# Patient Record
Sex: FEMALE | Race: WHITE | Employment: OTHER | URBAN - METROPOLITAN AREA
[De-identification: names, ages, dates, MRNs, and addresses within clinical notes are randomized per-mention and may not be internally consistent; named-entity substitution may affect disease eponyms.]

---

## 2017-01-31 PROBLEM — R10.9 ABDOMINAL PAIN, ACUTE: Status: ACTIVE | Noted: 2017-01-31

## 2017-03-14 PROBLEM — K57.20 DIVERTICULITIS OF LARGE INTESTINE WITH PERFORATION AND ABSCESS: Status: ACTIVE | Noted: 2017-03-14

## 2017-04-12 PROBLEM — Z93.3 S/P COLOSTOMY (HCC): Status: ACTIVE | Noted: 2017-04-12

## 2017-07-19 PROBLEM — M25.562 PAIN IN BOTH KNEES: Status: ACTIVE | Noted: 2017-07-19

## 2017-07-19 PROBLEM — M54.2 NECK PAIN: Status: ACTIVE | Noted: 2017-07-19

## 2017-07-19 PROBLEM — R76.8 RHEUMATOID FACTOR POSITIVE: Status: ACTIVE | Noted: 2017-07-19

## 2017-07-19 PROBLEM — M25.50 ARTHRALGIA: Status: ACTIVE | Noted: 2017-07-19

## 2017-07-19 PROBLEM — M25.561 PAIN IN BOTH KNEES: Status: ACTIVE | Noted: 2017-07-19

## 2017-09-12 PROBLEM — K21.9 GERD (GASTROESOPHAGEAL REFLUX DISEASE): Status: ACTIVE | Noted: 2017-09-12

## 2017-09-12 PROBLEM — M05.79 RHEUMATOID ARTHRITIS INVOLVING MULTIPLE SITES WITH POSITIVE RHEUMATOID FACTOR (HCC): Chronic | Status: ACTIVE | Noted: 2017-09-12

## 2017-09-12 PROBLEM — I10 ESSENTIAL HYPERTENSION: Chronic | Status: ACTIVE | Noted: 2017-09-12

## 2017-09-12 PROBLEM — E03.9 HYPOTHYROIDISM: Chronic | Status: ACTIVE | Noted: 2017-09-12

## 2017-10-24 PROBLEM — F32.A ANXIETY AND DEPRESSION: Status: ACTIVE | Noted: 2017-10-24

## 2017-10-24 PROBLEM — M85.80 OSTEOPENIA: Status: ACTIVE | Noted: 2017-10-24

## 2017-10-24 PROBLEM — E55.9 VITAMIN D DEFICIENCY: Status: ACTIVE | Noted: 2017-10-24

## 2017-10-24 PROBLEM — F41.9 ANXIETY AND DEPRESSION: Status: ACTIVE | Noted: 2017-10-24

## 2018-08-03 DIAGNOSIS — F32.A ANXIETY AND DEPRESSION: ICD-10-CM

## 2018-08-03 DIAGNOSIS — E03.9 ACQUIRED HYPOTHYROIDISM: ICD-10-CM

## 2018-08-03 DIAGNOSIS — F41.9 ANXIETY AND DEPRESSION: ICD-10-CM

## 2018-08-03 RX ORDER — LEVOTHYROXINE SODIUM 0.12 MG/1
125 TABLET ORAL DAILY
Qty: 30 TABLET | Refills: 0 | Status: SHIPPED | OUTPATIENT
Start: 2018-08-03 | End: 2018-08-10 | Stop reason: SDUPTHER

## 2018-08-03 RX ORDER — SERTRALINE HYDROCHLORIDE 100 MG/1
100 TABLET, FILM COATED ORAL DAILY
Qty: 30 TABLET | Refills: 0 | Status: SHIPPED | OUTPATIENT
Start: 2018-08-03 | End: 2018-08-10 | Stop reason: SDUPTHER

## 2018-08-10 ENCOUNTER — HOSPITAL ENCOUNTER (OUTPATIENT)
Age: 63
Discharge: HOME | End: 2018-08-12
Payer: MEDICARE

## 2018-08-10 ENCOUNTER — OFFICE VISIT (OUTPATIENT)
Dept: FAMILY MEDICINE CLINIC | Age: 63
End: 2018-08-10
Payer: MEDICARE

## 2018-08-10 VITALS
HEART RATE: 64 BPM | HEIGHT: 71 IN | BODY MASS INDEX: 41.02 KG/M2 | WEIGHT: 293 LBS | SYSTOLIC BLOOD PRESSURE: 120 MMHG | DIASTOLIC BLOOD PRESSURE: 80 MMHG | OXYGEN SATURATION: 95 %

## 2018-08-10 DIAGNOSIS — E55.9 VITAMIN D DEFICIENCY: ICD-10-CM

## 2018-08-10 DIAGNOSIS — K21.9 GASTROESOPHAGEAL REFLUX DISEASE, ESOPHAGITIS PRESENCE NOT SPECIFIED: Chronic | ICD-10-CM

## 2018-08-10 DIAGNOSIS — Z12.39 BREAST CANCER SCREENING: ICD-10-CM

## 2018-08-10 DIAGNOSIS — Z23 NEED FOR STREPTOCOCCUS PNEUMONIAE VACCINATION: ICD-10-CM

## 2018-08-10 DIAGNOSIS — E03.9 ACQUIRED HYPOTHYROIDISM: Primary | ICD-10-CM

## 2018-08-10 DIAGNOSIS — Z13.220 NEED FOR LIPID SCREENING: ICD-10-CM

## 2018-08-10 DIAGNOSIS — M05.79 RHEUMATOID ARTHRITIS INVOLVING MULTIPLE SITES WITH POSITIVE RHEUMATOID FACTOR (HCC): Chronic | ICD-10-CM

## 2018-08-10 DIAGNOSIS — F41.9 ANXIETY AND DEPRESSION: ICD-10-CM

## 2018-08-10 DIAGNOSIS — E03.9 ACQUIRED HYPOTHYROIDISM: ICD-10-CM

## 2018-08-10 DIAGNOSIS — Z93.3 S/P COLOSTOMY (HCC): ICD-10-CM

## 2018-08-10 DIAGNOSIS — F32.A ANXIETY AND DEPRESSION: ICD-10-CM

## 2018-08-10 PROBLEM — R10.9 ABDOMINAL PAIN, ACUTE: Status: RESOLVED | Noted: 2017-01-31 | Resolved: 2018-08-10

## 2018-08-10 PROBLEM — M85.80 OSTEOPENIA: Chronic | Status: ACTIVE | Noted: 2017-10-24

## 2018-08-10 PROBLEM — I10 ESSENTIAL HYPERTENSION: Chronic | Status: RESOLVED | Noted: 2017-09-12 | Resolved: 2018-08-10

## 2018-08-10 PROCEDURE — 82746 ASSAY OF FOLIC ACID SERUM: CPT

## 2018-08-10 PROCEDURE — 84439 ASSAY OF FREE THYROXINE: CPT

## 2018-08-10 PROCEDURE — 84443 ASSAY THYROID STIM HORMONE: CPT

## 2018-08-10 PROCEDURE — 85025 COMPLETE CBC W/AUTO DIFF WBC: CPT

## 2018-08-10 PROCEDURE — 80061 LIPID PANEL: CPT

## 2018-08-10 PROCEDURE — 82607 VITAMIN B-12: CPT

## 2018-08-10 PROCEDURE — 99214 OFFICE O/P EST MOD 30 MIN: CPT | Performed by: FAMILY MEDICINE

## 2018-08-10 PROCEDURE — 80053 COMPREHEN METABOLIC PANEL: CPT

## 2018-08-10 PROCEDURE — 36415 COLL VENOUS BLD VENIPUNCTURE: CPT

## 2018-08-10 PROCEDURE — 82306 VITAMIN D 25 HYDROXY: CPT

## 2018-08-10 RX ORDER — OMEPRAZOLE 20 MG/1
20 CAPSULE, DELAYED RELEASE ORAL DAILY
Qty: 30 CAPSULE | Refills: 5 | Status: SHIPPED | OUTPATIENT
Start: 2018-08-10 | End: 2019-04-22 | Stop reason: SDUPTHER

## 2018-08-10 RX ORDER — LEVOTHYROXINE SODIUM 0.12 MG/1
125 TABLET ORAL DAILY
Qty: 30 TABLET | Refills: 5 | Status: SHIPPED | OUTPATIENT
Start: 2018-08-10 | End: 2019-02-17 | Stop reason: SDUPTHER

## 2018-08-10 RX ORDER — IBUPROFEN 800 MG/1
TABLET ORAL
COMMUNITY
Start: 2018-08-07 | End: 2020-03-27 | Stop reason: ALTCHOICE

## 2018-08-10 RX ORDER — SERTRALINE HYDROCHLORIDE 100 MG/1
100 TABLET, FILM COATED ORAL DAILY
Qty: 30 TABLET | Refills: 5 | Status: SHIPPED | OUTPATIENT
Start: 2018-08-10 | End: 2019-02-17 | Stop reason: SDUPTHER

## 2018-08-10 RX ORDER — FOLIC ACID 1 MG/1
1 TABLET ORAL DAILY
COMMUNITY
End: 2019-09-18

## 2018-08-10 RX ORDER — METHOTREXATE 2.5 MG/1
TABLET ORAL
COMMUNITY
Start: 2018-07-29

## 2018-08-10 ASSESSMENT — PATIENT HEALTH QUESTIONNAIRE - PHQ9
2. FEELING DOWN, DEPRESSED OR HOPELESS: 0
SUM OF ALL RESPONSES TO PHQ9 QUESTIONS 1 & 2: 0
SUM OF ALL RESPONSES TO PHQ QUESTIONS 1-9: 0
SUM OF ALL RESPONSES TO PHQ QUESTIONS 1-9: 0
1. LITTLE INTEREST OR PLEASURE IN DOING THINGS: 0

## 2018-08-10 ASSESSMENT — ENCOUNTER SYMPTOMS
COUGH: 0
SHORTNESS OF BREATH: 0
WHEEZING: 0
ABDOMINAL PAIN: 0
BLOOD IN STOOL: 0

## 2018-08-10 NOTE — PROGRESS NOTES
Rose Granados   Patient is a 70y.o. year old female who presents with:  Chief Complaint   Patient presents with    Thyroid Problem     6 mo F/U     Health Maintenance     Needs mammo (pt did not get mammo ordered in January)      Patient also follows with: Rheumatologysees every 4-5 months. HPI    Rheumatoid arthritis: Current treatment methotrexate 20 mg weekly, folic acid 1 mg daily. Recent changes in medication: None. Patient reports symptoms have been well controlled with current treatment. She is following with rheumatology. Hypothyroidism: Current treatment: levothyroxine 125 ug daily. Recent changes in medication: none. Current symptoms: Weight gain. Patient denies diarrhea and palpitations. Symptoms have been well-controlled. Most recent relevant lab results as below. Lab Results   Component Value Date    TSH 2.090 03/06/2018    TSH 4.380 (H) 07/19/2017    TSH 3.640 01/24/2017     Anxiety and depression: Current treatment sertraline 100 mg daily. Recent changes in medication none. Patient reports symptoms have remained well controlled with the current treatment and denies any problems with the medication. GERD: Current treatment omeprazole 40 mg dailystates she actually takes it only about once every 3 days. Reports symptoms have been well controlled. Denies recent heartburn, dysphagia, blood in the stool. Vitamin D deficiency: Current treatment cholecalciferol 2000 units daily. Recent changes in medication: None. Relevant history includes osteopenia on most recent DEXA scan. Most recent lab results below. Lab Results   Component Value Date    VITD25 28 (L) 03/06/2018     Review of Systems   Constitutional: Positive for unexpected weight change. Negative for fatigue. Eyes: Negative for visual disturbance. Respiratory: Negative for cough, shortness of breath and wheezing. Cardiovascular: Negative for chest pain, palpitations and leg swelling.    Gastrointestinal: Negative for today; she is to get 1 set done prior to leaving today and the 2nd set 1 week prior to next visit. Will contact patient by phone if needed pending review of results. 1. Acquired hypothyroidism  Controlled, continue current treatment. - levothyroxine (SYNTHROID) 125 MCG tablet; Take 1 tablet by mouth Daily  Dispense: 30 tablet; Refill: 5  - T4, Free; Future  - TSH without Reflex; Future  - T4, Free; Future  - TSH without Reflex; Future    2. Anxiety and depression  Controlled, continue current treatment. - sertraline (ZOLOFT) 100 MG tablet; Take 1 tablet by mouth daily  Dispense: 30 tablet; Refill: 5    3. Rheumatoid arthritis involving multiple sites with positive rheumatoid factor (HCC)  Controlled, continue as per rheumatology    4. Gastroesophageal reflux disease, esophagitis presence not specified  Discontinue omeprazole 40 mg which she takes once every 3 days on average and begin omeprazole 20 mg daily  - omeprazole (PRILOSEC) 20 MG delayed release capsule; Take 1 capsule by mouth daily  Dispense: 30 capsule; Refill: 5  - CBC Auto Differential; Future  - CBC Auto Differential; Future  - Comprehensive Metabolic Panel; Future    5. Vitamin D deficiency  Continue current treatment and obtain relevant lab work for review next visit. - Comprehensive Metabolic Panel; Future  - Vitamin D 25 Hydrox, D2 & D3; Future  - Vitamin D 25 Hydrox, D2 & D3; Future    6. [facility]  Status post take down. Obtain relevant lab work reviewed  - CBC Auto Differential; Future  - Comprehensive Metabolic Panel; Future  - Vitamin D 25 Hydrox, D2 & D3; Future  - Vitamin B12 & Folate; Future  - Vitamin D 25 Hydrox, D2 & D3; Future  - Vitamin B12 & Folate; Future    7. Need for lipid screening  - Lipid Panel; Future    8. Breast cancer screening  - ALANNA DIGITAL SCREEN W CAD BILATERAL; Future    9. Need for Streptococcus pneumoniae vaccination  Prevnar ordered today but out of stock therefore not administered.     Return in about 6 months (around 2/10/2019) for lab review, or sooner as needed. Call or go to ED immediately if symptoms worsen or persist.    Educational materials and/or home exercises printed for patient's review and were included in patient instructions on his/her After Visit Summary and given to patient at the end of visit.       Counseled regarding above diagnosis, including possible risks and complications, especially if left uncontrolled.     Counseled regarding the possible side effects, risks, benefits and alternatives to treatment; patient and/or guardian verbalizes understanding, agrees, feels comfortable with and wishes to proceed with above treatment plan.     Advised patient to call with any new medication issues, and read all Rx info from pharmacy to assure aware of all possible risks and side effects of medication before taking.     Reviewed age and gender appropriate health screening exams and vaccinations. Advised patient regarding importance of keeping up with recommended health maintenance and to schedule as soon as possible if overdue, as this is important in assessing for undiagnosed pathology, especially cancer, as well as protecting against potentially harmful/life threatening disease.       Amy Anna DO  08/10/18  8:17 PM

## 2018-08-11 LAB
ALBUMIN SERPL-MCNC: 4.7 G/DL (ref 3.5–5.2)
ALP BLD-CCNC: 108 U/L (ref 35–104)
ALT SERPL-CCNC: 33 U/L (ref 0–32)
ANION GAP SERPL CALCULATED.3IONS-SCNC: 18 MMOL/L (ref 7–16)
AST SERPL-CCNC: 39 U/L (ref 0–31)
BASOPHILS ABSOLUTE: 0.05 E9/L (ref 0–0.2)
BASOPHILS RELATIVE PERCENT: 1.2 % (ref 0–2)
BILIRUB SERPL-MCNC: 0.5 MG/DL (ref 0–1.2)
BUN BLDV-MCNC: 14 MG/DL (ref 8–23)
CALCIUM SERPL-MCNC: 9.4 MG/DL (ref 8.6–10.2)
CHLORIDE BLD-SCNC: 102 MMOL/L (ref 98–107)
CHOLESTEROL, TOTAL: 216 MG/DL (ref 0–199)
CO2: 22 MMOL/L (ref 22–29)
CREAT SERPL-MCNC: 0.6 MG/DL (ref 0.5–1)
EOSINOPHILS ABSOLUTE: 0.08 E9/L (ref 0.05–0.5)
EOSINOPHILS RELATIVE PERCENT: 1.9 % (ref 0–6)
FOLATE: >20 NG/ML (ref 4.8–24.2)
GFR AFRICAN AMERICAN: >60
GFR NON-AFRICAN AMERICAN: >60 ML/MIN/1.73
GLUCOSE BLD-MCNC: 86 MG/DL (ref 74–109)
HCT VFR BLD CALC: 42.2 % (ref 34–48)
HDLC SERPL-MCNC: 62 MG/DL
HEMOGLOBIN: 13.8 G/DL (ref 11.5–15.5)
IMMATURE GRANULOCYTES #: 0.02 E9/L
IMMATURE GRANULOCYTES %: 0.5 % (ref 0–5)
LDL CHOLESTEROL CALCULATED: 126 MG/DL (ref 0–99)
LYMPHOCYTES ABSOLUTE: 0.64 E9/L (ref 1.5–4)
LYMPHOCYTES RELATIVE PERCENT: 15.2 % (ref 20–42)
MCH RBC QN AUTO: 28.3 PG (ref 26–35)
MCHC RBC AUTO-ENTMCNC: 32.7 % (ref 32–34.5)
MCV RBC AUTO: 86.7 FL (ref 80–99.9)
MONOCYTES ABSOLUTE: 0.36 E9/L (ref 0.1–0.95)
MONOCYTES RELATIVE PERCENT: 8.6 % (ref 2–12)
NEUTROPHILS ABSOLUTE: 3.05 E9/L (ref 1.8–7.3)
NEUTROPHILS RELATIVE PERCENT: 72.6 % (ref 43–80)
PDW BLD-RTO: 14.1 FL (ref 11.5–15)
PLATELET # BLD: 203 E9/L (ref 130–450)
PMV BLD AUTO: 11 FL (ref 7–12)
POTASSIUM SERPL-SCNC: 4.3 MMOL/L (ref 3.5–5)
RBC # BLD: 4.87 E12/L (ref 3.5–5.5)
SODIUM BLD-SCNC: 142 MMOL/L (ref 132–146)
T4 FREE: 1.81 NG/DL (ref 0.93–1.7)
TOTAL PROTEIN: 7.7 G/DL (ref 6.4–8.3)
TRIGL SERPL-MCNC: 139 MG/DL (ref 0–149)
TSH SERPL DL<=0.05 MIU/L-ACNC: 1.79 UIU/ML (ref 0.27–4.2)
VITAMIN B-12: 591 PG/ML (ref 211–946)
VITAMIN D 25-HYDROXY: 32 NG/ML (ref 30–100)
VLDLC SERPL CALC-MCNC: 28 MG/DL
WBC # BLD: 4.2 E9/L (ref 4.5–11.5)

## 2018-08-13 ENCOUNTER — TELEPHONE (OUTPATIENT)
Dept: FAMILY MEDICINE CLINIC | Age: 63
End: 2018-08-13

## 2018-12-18 ENCOUNTER — OFFICE VISIT (OUTPATIENT)
Dept: FAMILY MEDICINE CLINIC | Age: 63
End: 2018-12-18
Payer: MEDICARE

## 2018-12-18 VITALS
WEIGHT: 293 LBS | DIASTOLIC BLOOD PRESSURE: 86 MMHG | HEART RATE: 84 BPM | OXYGEN SATURATION: 99 % | SYSTOLIC BLOOD PRESSURE: 128 MMHG | BODY MASS INDEX: 39.68 KG/M2 | HEIGHT: 72 IN

## 2018-12-18 DIAGNOSIS — G51.0 BELL PALSY: Primary | ICD-10-CM

## 2018-12-18 PROCEDURE — 99213 OFFICE O/P EST LOW 20 MIN: CPT | Performed by: FAMILY MEDICINE

## 2018-12-18 RX ORDER — PREDNISONE 20 MG/1
20 TABLET ORAL 2 TIMES DAILY
Qty: 20 TABLET | Refills: 0 | Status: SHIPPED | OUTPATIENT
Start: 2018-12-18 | End: 2018-12-28

## 2018-12-18 RX ORDER — VALACYCLOVIR HYDROCHLORIDE 1 G/1
1000 TABLET, FILM COATED ORAL 3 TIMES DAILY
Qty: 21 TABLET | Refills: 0 | Status: SHIPPED | OUTPATIENT
Start: 2018-12-18 | End: 2018-12-25

## 2018-12-18 ASSESSMENT — ENCOUNTER SYMPTOMS
EYE PAIN: 0
TROUBLE SWALLOWING: 0
SHORTNESS OF BREATH: 0

## 2019-01-07 ENCOUNTER — OFFICE VISIT (OUTPATIENT)
Dept: FAMILY MEDICINE CLINIC | Age: 64
End: 2019-01-07
Payer: MEDICARE

## 2019-01-07 ENCOUNTER — HOSPITAL ENCOUNTER (OUTPATIENT)
Age: 64
Discharge: HOME | End: 2019-01-09
Payer: MEDICARE

## 2019-01-07 ENCOUNTER — PATIENT MESSAGE (OUTPATIENT)
Dept: FAMILY MEDICINE CLINIC | Age: 64
End: 2019-01-07

## 2019-01-07 VITALS
HEIGHT: 71 IN | BODY MASS INDEX: 41.02 KG/M2 | OXYGEN SATURATION: 94 % | WEIGHT: 293 LBS | SYSTOLIC BLOOD PRESSURE: 130 MMHG | DIASTOLIC BLOOD PRESSURE: 82 MMHG | HEART RATE: 103 BPM

## 2019-01-07 DIAGNOSIS — J18.9 COMMUNITY ACQUIRED PNEUMONIA, UNSPECIFIED LATERALITY: Primary | ICD-10-CM

## 2019-01-07 DIAGNOSIS — J18.9 COMMUNITY ACQUIRED PNEUMONIA, UNSPECIFIED LATERALITY: ICD-10-CM

## 2019-01-07 PROCEDURE — 87798 DETECT AGENT NOS DNA AMP: CPT

## 2019-01-07 PROCEDURE — 87581 M.PNEUMON DNA AMP PROBE: CPT

## 2019-01-07 PROCEDURE — 87486 CHLMYD PNEUM DNA AMP PROBE: CPT

## 2019-01-07 PROCEDURE — 85025 COMPLETE CBC W/AUTO DIFF WBC: CPT

## 2019-01-07 PROCEDURE — 36415 COLL VENOUS BLD VENIPUNCTURE: CPT

## 2019-01-07 PROCEDURE — 80053 COMPREHEN METABOLIC PANEL: CPT

## 2019-01-07 PROCEDURE — 99213 OFFICE O/P EST LOW 20 MIN: CPT | Performed by: FAMILY MEDICINE

## 2019-01-07 PROCEDURE — 87633 RESP VIRUS 12-25 TARGETS: CPT

## 2019-01-07 RX ORDER — BENZONATATE 100 MG/1
100 CAPSULE ORAL 3 TIMES DAILY PRN
Qty: 21 CAPSULE | Refills: 0 | Status: SHIPPED | OUTPATIENT
Start: 2019-01-07 | End: 2019-01-14

## 2019-01-07 RX ORDER — ALBUTEROL SULFATE 90 UG/1
2 AEROSOL, METERED RESPIRATORY (INHALATION) 4 TIMES DAILY PRN
Qty: 3 INHALER | Refills: 0 | Status: ON HOLD | OUTPATIENT
Start: 2019-01-07 | End: 2019-05-26

## 2019-01-07 RX ORDER — AZITHROMYCIN 250 MG/1
250 TABLET, FILM COATED ORAL SEE ADMIN INSTRUCTIONS
Qty: 6 TABLET | Refills: 0 | Status: SHIPPED | OUTPATIENT
Start: 2019-01-07 | End: 2019-01-12

## 2019-01-07 ASSESSMENT — ENCOUNTER SYMPTOMS
SHORTNESS OF BREATH: 1
COUGH: 1
ABDOMINAL PAIN: 0
NAUSEA: 1
BLOOD IN STOOL: 0
WHEEZING: 1
SINUS PAIN: 0
SORE THROAT: 0

## 2019-01-08 LAB
ALBUMIN SERPL-MCNC: 3.2 G/DL (ref 3.5–5.2)
ALP BLD-CCNC: 133 U/L (ref 35–104)
ALT SERPL-CCNC: 29 U/L (ref 0–32)
ANION GAP SERPL CALCULATED.3IONS-SCNC: 13 MMOL/L (ref 7–16)
ANISOCYTOSIS: ABNORMAL
AST SERPL-CCNC: 21 U/L (ref 0–31)
BASOPHILS ABSOLUTE: 0.04 E9/L (ref 0–0.2)
BASOPHILS RELATIVE PERCENT: 0.5 % (ref 0–2)
BILIRUB SERPL-MCNC: 0.7 MG/DL (ref 0–1.2)
BUN BLDV-MCNC: 17 MG/DL (ref 8–23)
BURR CELLS: ABNORMAL
CALCIUM SERPL-MCNC: 9.6 MG/DL (ref 8.6–10.2)
CHLORIDE BLD-SCNC: 99 MMOL/L (ref 98–107)
CO2: 26 MMOL/L (ref 22–29)
CREAT SERPL-MCNC: 0.6 MG/DL (ref 0.5–1)
EOSINOPHILS ABSOLUTE: 0.11 E9/L (ref 0.05–0.5)
EOSINOPHILS RELATIVE PERCENT: 1.3 % (ref 0–6)
FILM ARRAY ADENOVIRUS: NORMAL
FILM ARRAY BORDETELLA PERTUSSIS: NORMAL
FILM ARRAY CHLAMYDOPHILIA PNEUMONIAE: NORMAL
FILM ARRAY CORONAVIRUS 229E: NORMAL
FILM ARRAY CORONAVIRUS HKU1: NORMAL
FILM ARRAY CORONAVIRUS NL63: NORMAL
FILM ARRAY CORONAVIRUS OC43: NORMAL
FILM ARRAY INFLUENZA A VIRUS 09H1: NORMAL
FILM ARRAY INFLUENZA A VIRUS H1: NORMAL
FILM ARRAY INFLUENZA A VIRUS H3: NORMAL
FILM ARRAY INFLUENZA A VIRUS: NORMAL
FILM ARRAY INFLUENZA B: NORMAL
FILM ARRAY METAPNEUMOVIRUS: NORMAL
FILM ARRAY MYCOPLASMA PNEUMONIAE: NORMAL
FILM ARRAY PARAINFLUENZA VIRUS 1: NORMAL
FILM ARRAY PARAINFLUENZA VIRUS 2: NORMAL
FILM ARRAY PARAINFLUENZA VIRUS 3: NORMAL
FILM ARRAY PARAINFLUENZA VIRUS 4: NORMAL
FILM ARRAY RESPIRATORY SYNCITIAL VIRUS: NORMAL
FILM ARRAY RHINOVIRUS/ENTEROVIRUS: NORMAL
GFR AFRICAN AMERICAN: >60
GFR NON-AFRICAN AMERICAN: >60 ML/MIN/1.73
GLUCOSE BLD-MCNC: 114 MG/DL (ref 74–99)
HCT VFR BLD CALC: 44.7 % (ref 34–48)
HEMOGLOBIN: 14.4 G/DL (ref 11.5–15.5)
IMMATURE GRANULOCYTES #: 0.06 E9/L
IMMATURE GRANULOCYTES %: 0.7 % (ref 0–5)
LYMPHOCYTES ABSOLUTE: 0.37 E9/L (ref 1.5–4)
LYMPHOCYTES RELATIVE PERCENT: 4.2 % (ref 20–42)
MCH RBC QN AUTO: 28.6 PG (ref 26–35)
MCHC RBC AUTO-ENTMCNC: 32.2 % (ref 32–34.5)
MCV RBC AUTO: 88.9 FL (ref 80–99.9)
MONOCYTES ABSOLUTE: 0.52 E9/L (ref 0.1–0.95)
MONOCYTES RELATIVE PERCENT: 6 % (ref 2–12)
NEUTROPHILS ABSOLUTE: 7.62 E9/L (ref 1.8–7.3)
NEUTROPHILS RELATIVE PERCENT: 87.3 % (ref 43–80)
OVALOCYTES: ABNORMAL
PDW BLD-RTO: 14.4 FL (ref 11.5–15)
PLATELET # BLD: 241 E9/L (ref 130–450)
PMV BLD AUTO: 10.4 FL (ref 7–12)
POIKILOCYTES: ABNORMAL
POLYCHROMASIA: ABNORMAL
POTASSIUM SERPL-SCNC: 4.1 MMOL/L (ref 3.5–5)
RBC # BLD: 5.03 E12/L (ref 3.5–5.5)
SODIUM BLD-SCNC: 138 MMOL/L (ref 132–146)
TEAR DROP CELLS: ABNORMAL
TOTAL PROTEIN: 8.1 G/DL (ref 6.4–8.3)
WBC # BLD: 8.7 E9/L (ref 4.5–11.5)

## 2019-01-17 ENCOUNTER — TELEPHONE (OUTPATIENT)
Dept: FAMILY MEDICINE CLINIC | Age: 64
End: 2019-01-17

## 2019-01-17 PROBLEM — M25.561 PAIN IN BOTH KNEES: Status: RESOLVED | Noted: 2017-07-19 | Resolved: 2019-01-17

## 2019-01-17 PROBLEM — M25.562 PAIN IN BOTH KNEES: Status: RESOLVED | Noted: 2017-07-19 | Resolved: 2019-01-17

## 2019-01-17 PROBLEM — K57.20 DIVERTICULITIS OF LARGE INTESTINE WITH ABSCESS WITHOUT BLEEDING: Status: RESOLVED | Noted: 2017-03-14 | Resolved: 2019-01-17

## 2019-01-23 ENCOUNTER — OFFICE VISIT (OUTPATIENT)
Dept: FAMILY MEDICINE CLINIC | Age: 64
End: 2019-01-23
Payer: MEDICARE

## 2019-01-23 VITALS
OXYGEN SATURATION: 95 % | SYSTOLIC BLOOD PRESSURE: 126 MMHG | WEIGHT: 293 LBS | BODY MASS INDEX: 39.68 KG/M2 | HEART RATE: 85 BPM | HEIGHT: 72 IN | DIASTOLIC BLOOD PRESSURE: 80 MMHG

## 2019-01-23 DIAGNOSIS — J18.9 COMMUNITY ACQUIRED PNEUMONIA, UNSPECIFIED LATERALITY: Primary | ICD-10-CM

## 2019-01-23 DIAGNOSIS — Z12.39 BREAST CANCER SCREENING: ICD-10-CM

## 2019-01-23 PROCEDURE — 99213 OFFICE O/P EST LOW 20 MIN: CPT | Performed by: FAMILY MEDICINE

## 2019-01-23 RX ORDER — LEVOFLOXACIN 500 MG/1
500 TABLET, FILM COATED ORAL DAILY
Qty: 10 TABLET | Refills: 0 | Status: SHIPPED | OUTPATIENT
Start: 2019-01-23 | End: 2019-02-02

## 2019-01-23 ASSESSMENT — ENCOUNTER SYMPTOMS
SORE THROAT: 0
ABDOMINAL PAIN: 0
SHORTNESS OF BREATH: 0
BLOOD IN STOOL: 0
NAUSEA: 0
SINUS PAIN: 0
COUGH: 1
WHEEZING: 1

## 2019-02-17 DIAGNOSIS — F32.A ANXIETY AND DEPRESSION: ICD-10-CM

## 2019-02-17 DIAGNOSIS — F41.9 ANXIETY AND DEPRESSION: ICD-10-CM

## 2019-02-17 DIAGNOSIS — E03.9 ACQUIRED HYPOTHYROIDISM: ICD-10-CM

## 2019-02-18 RX ORDER — LEVOTHYROXINE SODIUM 0.12 MG/1
TABLET ORAL
Qty: 30 TABLET | Refills: 5 | Status: SHIPPED | OUTPATIENT
Start: 2019-02-18 | End: 2019-08-18 | Stop reason: SDUPTHER

## 2019-02-18 RX ORDER — SERTRALINE HYDROCHLORIDE 100 MG/1
TABLET, FILM COATED ORAL
Qty: 30 TABLET | Refills: 5 | Status: SHIPPED | OUTPATIENT
Start: 2019-02-18 | End: 2019-08-18 | Stop reason: SDUPTHER

## 2019-03-01 ENCOUNTER — OFFICE VISIT (OUTPATIENT)
Dept: FAMILY MEDICINE CLINIC | Age: 64
End: 2019-03-01
Payer: MEDICARE

## 2019-03-01 VITALS
BODY MASS INDEX: 39.68 KG/M2 | HEART RATE: 61 BPM | WEIGHT: 293 LBS | OXYGEN SATURATION: 97 % | SYSTOLIC BLOOD PRESSURE: 128 MMHG | DIASTOLIC BLOOD PRESSURE: 82 MMHG | HEIGHT: 72 IN

## 2019-03-01 DIAGNOSIS — Z23 NEED FOR STREPTOCOCCUS PNEUMONIAE VACCINATION: ICD-10-CM

## 2019-03-01 DIAGNOSIS — Z01.818 PREOPERATIVE EXAMINATION: Primary | ICD-10-CM

## 2019-03-01 PROCEDURE — G0009 ADMIN PNEUMOCOCCAL VACCINE: HCPCS | Performed by: FAMILY MEDICINE

## 2019-03-01 PROCEDURE — 99213 OFFICE O/P EST LOW 20 MIN: CPT | Performed by: FAMILY MEDICINE

## 2019-03-01 PROCEDURE — 90670 PCV13 VACCINE IM: CPT | Performed by: FAMILY MEDICINE

## 2019-03-01 ASSESSMENT — PATIENT HEALTH QUESTIONNAIRE - PHQ9
SUM OF ALL RESPONSES TO PHQ QUESTIONS 1-9: 0
2. FEELING DOWN, DEPRESSED OR HOPELESS: 0
SUM OF ALL RESPONSES TO PHQ QUESTIONS 1-9: 0
1. LITTLE INTEREST OR PLEASURE IN DOING THINGS: 0
SUM OF ALL RESPONSES TO PHQ9 QUESTIONS 1 & 2: 0

## 2019-03-01 ASSESSMENT — ENCOUNTER SYMPTOMS
ABDOMINAL PAIN: 0
WHEEZING: 0
SHORTNESS OF BREATH: 0
DIARRHEA: 0
COUGH: 0
CHEST TIGHTNESS: 0
SINUS PAIN: 0
SORE THROAT: 0

## 2019-04-22 DIAGNOSIS — K21.9 GASTROESOPHAGEAL REFLUX DISEASE, ESOPHAGITIS PRESENCE NOT SPECIFIED: Chronic | ICD-10-CM

## 2019-04-23 RX ORDER — OMEPRAZOLE 20 MG/1
CAPSULE, DELAYED RELEASE ORAL
Qty: 30 CAPSULE | Refills: 5 | Status: SHIPPED | OUTPATIENT
Start: 2019-04-23 | End: 2019-10-10 | Stop reason: SDUPTHER

## 2019-05-25 ENCOUNTER — APPOINTMENT (OUTPATIENT)
Dept: CT IMAGING | Age: 64
DRG: 872 | End: 2019-05-25
Payer: MEDICARE

## 2019-05-25 ENCOUNTER — HOSPITAL ENCOUNTER (EMERGENCY)
Age: 64
Discharge: HOME | DRG: 872 | End: 2019-05-26
Attending: EMERGENCY MEDICINE
Payer: MEDICARE

## 2019-05-25 ENCOUNTER — APPOINTMENT (OUTPATIENT)
Dept: GENERAL RADIOLOGY | Age: 64
DRG: 872 | End: 2019-05-25
Payer: MEDICARE

## 2019-05-25 ENCOUNTER — HOSPITAL ENCOUNTER (EMERGENCY)
Age: 64
Discharge: HOME | DRG: 872 | End: 2019-05-25
Attending: EMERGENCY MEDICINE
Payer: MEDICARE

## 2019-05-25 VITALS
HEART RATE: 85 BPM | RESPIRATION RATE: 18 BRPM | BODY MASS INDEX: 39.68 KG/M2 | WEIGHT: 293 LBS | OXYGEN SATURATION: 98 % | DIASTOLIC BLOOD PRESSURE: 74 MMHG | TEMPERATURE: 98.4 F | SYSTOLIC BLOOD PRESSURE: 127 MMHG | HEIGHT: 72 IN

## 2019-05-25 DIAGNOSIS — R51.9 NONINTRACTABLE HEADACHE, UNSPECIFIED CHRONICITY PATTERN, UNSPECIFIED HEADACHE TYPE: ICD-10-CM

## 2019-05-25 DIAGNOSIS — N12 PYELONEPHRITIS: Primary | ICD-10-CM

## 2019-05-25 DIAGNOSIS — R11.0 NAUSEA: ICD-10-CM

## 2019-05-25 DIAGNOSIS — R53.83 OTHER FATIGUE: ICD-10-CM

## 2019-05-25 DIAGNOSIS — E87.6 HYPOKALEMIA: ICD-10-CM

## 2019-05-25 DIAGNOSIS — R11.2 NAUSEA AND VOMITING, INTRACTABILITY OF VOMITING NOT SPECIFIED, UNSPECIFIED VOMITING TYPE: Primary | ICD-10-CM

## 2019-05-25 LAB
ALBUMIN SERPL-MCNC: 3.6 G/DL (ref 3.5–5.2)
ALP BLD-CCNC: 117 U/L (ref 35–104)
ALT SERPL-CCNC: 32 U/L (ref 0–32)
ANION GAP SERPL CALCULATED.3IONS-SCNC: 12 MMOL/L (ref 7–16)
ANION GAP SERPL CALCULATED.3IONS-SCNC: 14 MMOL/L (ref 7–16)
ANISOCYTOSIS: ABNORMAL
AST SERPL-CCNC: 26 U/L (ref 0–31)
BACTERIA: ABNORMAL /HPF
BASOPHILS ABSOLUTE: 0 E9/L (ref 0–0.2)
BASOPHILS ABSOLUTE: 0.06 E9/L (ref 0–0.2)
BASOPHILS RELATIVE PERCENT: 0.3 % (ref 0–2)
BASOPHILS RELATIVE PERCENT: 0.9 % (ref 0–2)
BILIRUB SERPL-MCNC: 1 MG/DL (ref 0–1.2)
BILIRUBIN URINE: NEGATIVE
BLOOD, URINE: ABNORMAL
BUN BLDV-MCNC: 13 MG/DL (ref 8–23)
BUN BLDV-MCNC: 17 MG/DL (ref 8–23)
CALCIUM SERPL-MCNC: 7.9 MG/DL (ref 8.6–10.2)
CALCIUM SERPL-MCNC: 8.6 MG/DL (ref 8.6–10.2)
CHLORIDE BLD-SCNC: 97 MMOL/L (ref 98–107)
CHLORIDE BLD-SCNC: 99 MMOL/L (ref 98–107)
CLARITY: CLEAR
CO2: 21 MMOL/L (ref 22–29)
CO2: 26 MMOL/L (ref 22–29)
COLOR: YELLOW
CREAT SERPL-MCNC: 0.7 MG/DL (ref 0.5–1)
CREAT SERPL-MCNC: 0.7 MG/DL (ref 0.5–1)
EOSINOPHILS ABSOLUTE: 0 E9/L (ref 0.05–0.5)
EOSINOPHILS ABSOLUTE: 0 E9/L (ref 0.05–0.5)
EOSINOPHILS RELATIVE PERCENT: 0 % (ref 0–6)
EOSINOPHILS RELATIVE PERCENT: 0 % (ref 0–6)
EPITHELIAL CELLS, UA: ABNORMAL /HPF
GFR AFRICAN AMERICAN: >60
GFR AFRICAN AMERICAN: >60
GFR NON-AFRICAN AMERICAN: >60 ML/MIN/1.73
GFR NON-AFRICAN AMERICAN: >60 ML/MIN/1.73
GLUCOSE BLD-MCNC: 167 MG/DL (ref 74–99)
GLUCOSE BLD-MCNC: 194 MG/DL (ref 74–99)
GLUCOSE URINE: NEGATIVE MG/DL
HCT VFR BLD CALC: 36 % (ref 34–48)
HCT VFR BLD CALC: 38.8 % (ref 34–48)
HEMOGLOBIN: 12.3 G/DL (ref 11.5–15.5)
HEMOGLOBIN: 12.9 G/DL (ref 11.5–15.5)
INFLUENZA A BY PCR: NOT DETECTED
INFLUENZA B BY PCR: NOT DETECTED
KETONES, URINE: NEGATIVE MG/DL
LACTIC ACID: 1 MMOL/L (ref 0.5–2.2)
LEUKOCYTE ESTERASE, URINE: ABNORMAL
LIPASE: 8 U/L (ref 13–60)
LYMPHOCYTES ABSOLUTE: 0.36 E9/L (ref 1.5–4)
LYMPHOCYTES ABSOLUTE: 0.4 E9/L (ref 1.5–4)
LYMPHOCYTES RELATIVE PERCENT: 5.2 % (ref 20–42)
LYMPHOCYTES RELATIVE PERCENT: 6.1 % (ref 20–42)
MAGNESIUM: 2 MG/DL (ref 1.6–2.6)
MCH RBC QN AUTO: 28.6 PG (ref 26–35)
MCH RBC QN AUTO: 29.3 PG (ref 26–35)
MCHC RBC AUTO-ENTMCNC: 33.2 % (ref 32–34.5)
MCHC RBC AUTO-ENTMCNC: 34.2 % (ref 32–34.5)
MCV RBC AUTO: 85.7 FL (ref 80–99.9)
MCV RBC AUTO: 86 FL (ref 80–99.9)
MONOCYTES ABSOLUTE: 0.36 E9/L (ref 0.1–0.95)
MONOCYTES ABSOLUTE: 0.4 E9/L (ref 0.1–0.95)
MONOCYTES RELATIVE PERCENT: 5.2 % (ref 2–12)
MONOCYTES RELATIVE PERCENT: 6.1 % (ref 2–12)
NEUTROPHILS ABSOLUTE: 5.81 E9/L (ref 1.8–7.3)
NEUTROPHILS ABSOLUTE: 6.41 E9/L (ref 1.8–7.3)
NEUTROPHILS RELATIVE PERCENT: 87.8 % (ref 43–80)
NEUTROPHILS RELATIVE PERCENT: 88.7 % (ref 43–80)
NITRITE, URINE: NEGATIVE
OVALOCYTES: ABNORMAL
PDW BLD-RTO: 14.2 FL (ref 11.5–15)
PDW BLD-RTO: 14.4 FL (ref 11.5–15)
PH UA: 6 (ref 5–9)
PLATELET # BLD: 149 E9/L (ref 130–450)
PLATELET # BLD: 151 E9/L (ref 130–450)
PMV BLD AUTO: 10.3 FL (ref 7–12)
PMV BLD AUTO: 10.6 FL (ref 7–12)
POIKILOCYTES: ABNORMAL
POLYCHROMASIA: ABNORMAL
POTASSIUM REFLEX MAGNESIUM: 3 MMOL/L (ref 3.5–5)
POTASSIUM SERPL-SCNC: 3.6 MMOL/L (ref 3.5–5)
PROTEIN UA: 100 MG/DL
RBC # BLD: 4.2 E12/L (ref 3.5–5.5)
RBC # BLD: 4.51 E12/L (ref 3.5–5.5)
RBC UA: ABNORMAL /HPF (ref 0–2)
SODIUM BLD-SCNC: 134 MMOL/L (ref 132–146)
SODIUM BLD-SCNC: 135 MMOL/L (ref 132–146)
SPECIFIC GRAVITY UA: 1.01 (ref 1–1.03)
TOTAL PROTEIN: 7.2 G/DL (ref 6.4–8.3)
TROPONIN: <0.01 NG/ML (ref 0–0.03)
TROPONIN: <0.01 NG/ML (ref 0–0.03)
TSH SERPL DL<=0.05 MIU/L-ACNC: 2.41 UIU/ML (ref 0.27–4.2)
UROBILINOGEN, URINE: 2 E.U./DL
WBC # BLD: 6.6 E9/L (ref 4.5–11.5)
WBC # BLD: 7.2 E9/L (ref 4.5–11.5)
WBC UA: ABNORMAL /HPF (ref 0–5)

## 2019-05-25 PROCEDURE — 93005 ELECTROCARDIOGRAM TRACING: CPT | Performed by: EMERGENCY MEDICINE

## 2019-05-25 PROCEDURE — 71046 X-RAY EXAM CHEST 2 VIEWS: CPT

## 2019-05-25 PROCEDURE — 74177 CT ABD & PELVIS W/CONTRAST: CPT

## 2019-05-25 PROCEDURE — 81001 URINALYSIS AUTO W/SCOPE: CPT

## 2019-05-25 PROCEDURE — 87077 CULTURE AEROBIC IDENTIFY: CPT

## 2019-05-25 PROCEDURE — 96375 TX/PRO/DX INJ NEW DRUG ADDON: CPT

## 2019-05-25 PROCEDURE — 83605 ASSAY OF LACTIC ACID: CPT

## 2019-05-25 PROCEDURE — 80048 BASIC METABOLIC PNL TOTAL CA: CPT

## 2019-05-25 PROCEDURE — 85025 COMPLETE CBC W/AUTO DIFF WBC: CPT

## 2019-05-25 PROCEDURE — 6360000002 HC RX W HCPCS: Performed by: EMERGENCY MEDICINE

## 2019-05-25 PROCEDURE — 6360000002 HC RX W HCPCS: Performed by: STUDENT IN AN ORGANIZED HEALTH CARE EDUCATION/TRAINING PROGRAM

## 2019-05-25 PROCEDURE — 99285 EMERGENCY DEPT VISIT HI MDM: CPT

## 2019-05-25 PROCEDURE — 70470 CT HEAD/BRAIN W/O & W/DYE: CPT

## 2019-05-25 PROCEDURE — 84443 ASSAY THYROID STIM HORMONE: CPT

## 2019-05-25 PROCEDURE — 87502 INFLUENZA DNA AMP PROBE: CPT

## 2019-05-25 PROCEDURE — 87040 BLOOD CULTURE FOR BACTERIA: CPT

## 2019-05-25 PROCEDURE — 6360000004 HC RX CONTRAST MEDICATION: Performed by: RADIOLOGY

## 2019-05-25 PROCEDURE — 96361 HYDRATE IV INFUSION ADD-ON: CPT

## 2019-05-25 PROCEDURE — 84484 ASSAY OF TROPONIN QUANT: CPT

## 2019-05-25 PROCEDURE — 83690 ASSAY OF LIPASE: CPT

## 2019-05-25 PROCEDURE — 87186 SC STD MICRODIL/AGAR DIL: CPT

## 2019-05-25 PROCEDURE — 6370000000 HC RX 637 (ALT 250 FOR IP): Performed by: EMERGENCY MEDICINE

## 2019-05-25 PROCEDURE — 2580000003 HC RX 258: Performed by: STUDENT IN AN ORGANIZED HEALTH CARE EDUCATION/TRAINING PROGRAM

## 2019-05-25 PROCEDURE — 83735 ASSAY OF MAGNESIUM: CPT

## 2019-05-25 PROCEDURE — 36415 COLL VENOUS BLD VENIPUNCTURE: CPT

## 2019-05-25 PROCEDURE — 6370000000 HC RX 637 (ALT 250 FOR IP): Performed by: STUDENT IN AN ORGANIZED HEALTH CARE EDUCATION/TRAINING PROGRAM

## 2019-05-25 PROCEDURE — 2580000003 HC RX 258: Performed by: EMERGENCY MEDICINE

## 2019-05-25 PROCEDURE — 96374 THER/PROPH/DIAG INJ IV PUSH: CPT

## 2019-05-25 PROCEDURE — 99284 EMERGENCY DEPT VISIT MOD MDM: CPT

## 2019-05-25 PROCEDURE — 80053 COMPREHEN METABOLIC PANEL: CPT

## 2019-05-25 RX ORDER — ACETAMINOPHEN 325 MG/1
650 TABLET ORAL ONCE
Status: COMPLETED | OUTPATIENT
Start: 2019-05-25 | End: 2019-05-25

## 2019-05-25 RX ORDER — 0.9 % SODIUM CHLORIDE 0.9 %
2000 INTRAVENOUS SOLUTION INTRAVENOUS ONCE
Status: COMPLETED | OUTPATIENT
Start: 2019-05-25 | End: 2019-05-25

## 2019-05-25 RX ORDER — ETANERCEPT 50 MG/ML
SOLUTION SUBCUTANEOUS
Refills: 12 | COMMUNITY
Start: 2019-05-14 | End: 2019-09-18

## 2019-05-25 RX ORDER — 0.9 % SODIUM CHLORIDE 0.9 %
1000 INTRAVENOUS SOLUTION INTRAVENOUS ONCE
Status: COMPLETED | OUTPATIENT
Start: 2019-05-25 | End: 2019-05-26

## 2019-05-25 RX ORDER — POTASSIUM CHLORIDE 7.45 MG/ML
10 INJECTION INTRAVENOUS ONCE
Status: COMPLETED | OUTPATIENT
Start: 2019-05-25 | End: 2019-05-25

## 2019-05-25 RX ORDER — SODIUM CHLORIDE 9 MG/ML
INJECTION, SOLUTION INTRAVENOUS
Status: DISCONTINUED
Start: 2019-05-25 | End: 2019-05-25 | Stop reason: HOSPADM

## 2019-05-25 RX ORDER — POTASSIUM CHLORIDE 20 MEQ/1
40 TABLET, EXTENDED RELEASE ORAL ONCE
Status: COMPLETED | OUTPATIENT
Start: 2019-05-25 | End: 2019-05-25

## 2019-05-25 RX ORDER — METOCLOPRAMIDE HYDROCHLORIDE 5 MG/ML
10 INJECTION INTRAMUSCULAR; INTRAVENOUS ONCE
Status: COMPLETED | OUTPATIENT
Start: 2019-05-25 | End: 2019-05-25

## 2019-05-25 RX ORDER — 0.9 % SODIUM CHLORIDE 0.9 %
1000 INTRAVENOUS SOLUTION INTRAVENOUS ONCE
Status: COMPLETED | OUTPATIENT
Start: 2019-05-25 | End: 2019-05-25

## 2019-05-25 RX ORDER — DIPHENHYDRAMINE HYDROCHLORIDE 50 MG/ML
25 INJECTION INTRAMUSCULAR; INTRAVENOUS ONCE
Status: COMPLETED | OUTPATIENT
Start: 2019-05-25 | End: 2019-05-25

## 2019-05-25 RX ORDER — ONDANSETRON 2 MG/ML
4 INJECTION INTRAMUSCULAR; INTRAVENOUS ONCE
Status: COMPLETED | OUTPATIENT
Start: 2019-05-25 | End: 2019-05-25

## 2019-05-25 RX ORDER — KETOROLAC TROMETHAMINE 15 MG/ML
15 INJECTION, SOLUTION INTRAMUSCULAR; INTRAVENOUS ONCE
Status: COMPLETED | OUTPATIENT
Start: 2019-05-25 | End: 2019-05-25

## 2019-05-25 RX ORDER — ACETAMINOPHEN 500 MG
1000 TABLET ORAL ONCE
Status: COMPLETED | OUTPATIENT
Start: 2019-05-25 | End: 2019-05-25

## 2019-05-25 RX ADMIN — IOPAMIDOL 50 ML: 755 INJECTION, SOLUTION INTRAVENOUS at 12:03

## 2019-05-25 RX ADMIN — DIPHENHYDRAMINE HYDROCHLORIDE 25 MG: 50 INJECTION INTRAMUSCULAR; INTRAVENOUS at 12:44

## 2019-05-25 RX ADMIN — ACETAMINOPHEN 1000 MG: 500 TABLET, FILM COATED ORAL at 22:05

## 2019-05-25 RX ADMIN — POTASSIUM CHLORIDE 40 MEQ: 20 TABLET, EXTENDED RELEASE ORAL at 11:14

## 2019-05-25 RX ADMIN — SODIUM CHLORIDE 1000 ML: 9 INJECTION, SOLUTION INTRAVENOUS at 12:44

## 2019-05-25 RX ADMIN — ONDANSETRON 4 MG: 2 INJECTION INTRAMUSCULAR; INTRAVENOUS at 23:57

## 2019-05-25 RX ADMIN — IOPAMIDOL 80 ML: 755 INJECTION, SOLUTION INTRAVENOUS at 22:57

## 2019-05-25 RX ADMIN — ONDANSETRON 4 MG: 2 INJECTION INTRAMUSCULAR; INTRAVENOUS at 09:53

## 2019-05-25 RX ADMIN — SODIUM CHLORIDE 2000 ML: 9 INJECTION, SOLUTION INTRAVENOUS at 09:51

## 2019-05-25 RX ADMIN — KETOROLAC TROMETHAMINE 15 MG: 15 INJECTION, SOLUTION INTRAMUSCULAR; INTRAVENOUS at 11:15

## 2019-05-25 RX ADMIN — METOCLOPRAMIDE 10 MG: 5 INJECTION, SOLUTION INTRAMUSCULAR; INTRAVENOUS at 12:44

## 2019-05-25 RX ADMIN — POTASSIUM CHLORIDE 10 MEQ: 7.46 INJECTION, SOLUTION INTRAVENOUS at 11:18

## 2019-05-25 RX ADMIN — WATER 1 G: 1 INJECTION INTRAMUSCULAR; INTRAVENOUS; SUBCUTANEOUS at 23:57

## 2019-05-25 RX ADMIN — SODIUM CHLORIDE 1000 ML: 9 INJECTION, SOLUTION INTRAVENOUS at 22:05

## 2019-05-25 RX ADMIN — ACETAMINOPHEN 650 MG: 325 TABLET, FILM COATED ORAL at 09:53

## 2019-05-25 ASSESSMENT — ENCOUNTER SYMPTOMS
COLOR CHANGE: 0
SHORTNESS OF BREATH: 0
WHEEZING: 0
DIARRHEA: 0
SHORTNESS OF BREATH: 0
DIARRHEA: 0
ABDOMINAL PAIN: 0
NAUSEA: 1
BACK PAIN: 0
COUGH: 0
SINUS PAIN: 0
CHEST TIGHTNESS: 0
VOMITING: 1
CONSTIPATION: 0
SORE THROAT: 0
NAUSEA: 0
ABDOMINAL PAIN: 0
COUGH: 0
VOMITING: 0

## 2019-05-25 ASSESSMENT — PAIN DESCRIPTION - FREQUENCY
FREQUENCY: CONTINUOUS
FREQUENCY: INTERMITTENT

## 2019-05-25 ASSESSMENT — PAIN DESCRIPTION - PAIN TYPE
TYPE: CHRONIC PAIN
TYPE: ACUTE PAIN

## 2019-05-25 ASSESSMENT — PAIN DESCRIPTION - DESCRIPTORS
DESCRIPTORS: ACHING

## 2019-05-25 ASSESSMENT — PAIN DESCRIPTION - LOCATION
LOCATION: HEAD;EAR
LOCATION: HEAD
LOCATION: HEAD
LOCATION: LEG

## 2019-05-25 ASSESSMENT — PAIN SCALES - GENERAL
PAINLEVEL_OUTOF10: 7
PAINLEVEL_OUTOF10: 4
PAINLEVEL_OUTOF10: 6
PAINLEVEL_OUTOF10: 7
PAINLEVEL_OUTOF10: 0
PAINLEVEL_OUTOF10: 8

## 2019-05-25 ASSESSMENT — PAIN DESCRIPTION - ONSET: ONSET: ON-GOING

## 2019-05-25 ASSESSMENT — PAIN DESCRIPTION - ORIENTATION
ORIENTATION: RIGHT;LEFT
ORIENTATION: LEFT;RIGHT

## 2019-05-25 ASSESSMENT — PAIN DESCRIPTION - PROGRESSION: CLINICAL_PROGRESSION: GRADUALLY IMPROVING

## 2019-05-25 NOTE — ED NOTES
Patient ambulated to bathroom, gait weak but steady. Awaiting daughter for transport home.       Salena Amor RN  05/25/19 [id number]

## 2019-05-25 NOTE — ED PROVIDER NOTES
Abdominal: Soft. Bowel sounds are normal. She exhibits no distension. There is no tenderness. Musculoskeletal: Normal range of motion. She exhibits no edema. Neurological: She is alert and oriented to person, place, and time. No cranial nerve deficit or sensory deficit. She exhibits normal muscle tone. Skin: Skin is warm and dry. Capillary refill takes less than 2 seconds. She is not diaphoretic. Psychiatric: She has a normal mood and affect. Her behavior is normal. Judgment and thought content normal.   Nursing note and vitals reviewed. Procedures    MDM  Number of Diagnoses or Management Options  Hypokalemia:   Nausea and vomiting, intractability of vomiting not specified, unspecified vomiting type:   Nonintractable headache, unspecified chronicity pattern, unspecified headache type: Other fatigue:   Diagnosis management comments: This is a 70 old female that presents with nausea, vomiting and fatigue that has been going on since Monday. She took Tylenol and Advil last yesterday. She doesn't have any complaints besides a mild headache in the front of her head but daughter says she thinks she has a urinary tract infection because she has been urinating a lot lately. No neck or back pain on exam. No confusion. She did not get a flu shot but she did get a pneumonia shot this year. She is febrile here to 101°. We'll give Tylenol, fluids, check labs. Patient continues to have a headache that isn't much improved with Toradol but after giving the cocktail consistent with Reglan and Benadryl she feels much better. She is able to tolerate food. We had discussed getting a lumbar puncture to evaluate for meningitis although we feel this is less likely given her 5 day history of a headache and fever and she does not want to have this done. Discussed return precautions with the patient, she voices understanding. She will see her doctor next week for reevaluation. She is discharged in no acute distress. Amount and/or Complexity of Data Reviewed  Clinical lab tests: ordered and reviewed  Tests in the radiology section of CPT®: ordered and reviewed        ED Course as of May 25 1347   Sat May 25, 2019   0914 ATTENDING PROVIDER ATTESTATION:     I have personally performed and/or participated in the history, exam, medical decision making, and procedures and agree with all pertinent clinical information unless otherwise noted. I have also reviewed and agree with the past medical, family and social history unless otherwise noted. I have discussed this patient in detail with the resident, and provided the instruction and education regarding patient here with family complaining of not feeling well for the last 3 or 4 days with fevers, nausea, intermittent vomiting. Generally just doesn't feel well with aches. No cough or congestion. No dysuria. No headache or stiff neck. No confusion. No abdominal pain or flank pain. No new rashes. No chest pain or shortness of breath. .  My findings/plan: Patient is an obese female resting comfortable in no acute distress. Heart rate regular, no murmur, lungs are clear and equal. Abdomen soft and nontender with no distention and no CVA tenderness. She has no jaundice or icterus. No gross rashes. No pretibial edema or calf pain. No focal neurologic deficit. Awake and alert. Vital signs noted, fever noted. [NC]   6278 Patient resting in NAD. [CW]   4944 Went to reevaluate patient, she states that she was having a dull frontal headache that got better with Tylenol but she is now having worsening pain in the front of her face radiating to her ears. Oropharyngeal and examination of tympanic membranes is unremarkable. She still has no neck pain or meningeal signs whatsoever. We will add CT head with and without to evaluate for any kind of abscess as patient is immunocompromised. We'll plan to give Toradol for pain. [CW]   1101 Patient hypokalemic, will add potassium. [CW]   1113 Fever has come down.    [CW]   0532 Patient over in  imaging. [CW]   0679 CT head unremarkable. Patient states she is still having a headache, feels a little bit better with the Toradol. We'll give Reglan, Benadryl, fluids and reassess. [CW]   2951 Patient reassessed, she is eating a hamburger and fries. States that she is feeling much better. Close return precautions discussed, she voices understanding.    [CW]      ED Course User Index  [CW] Mary Doshi DO  [NC] Antonio Kent DO       EKG: This EKG is signed and interpreted by me. Rate: 93  Rhythm: Sinus  Interpretation: nsr, no BIANCA or depression  Comparison: previously sinus tach       --------------------------------------------- PAST HISTORY ---------------------------------------------  Past Medical History:  has a past medical history of Diverticulitis, GERD (gastroesophageal reflux disease), Rheumatoid arthritis involving multiple sites with positive rheumatoid factor ([facility] [address] and Thyroid disease. Past Surgical History:  has a past surgical history that includes Appendectomy; Tonsillectomy; Cholecystectomy, laparoscopic (02/23/2017); other surgical history (04/11/2017); Colonoscopy (09/11/2017); and other surgical history (09/12/2017). Social History:  reports that she quit smoking about 32 years ago. She smoked 0.00 packs per day for 0.00 years. She has never used smokeless tobacco. She reports that she does not drink alcohol or use drugs. Family History: family history includes Breast Cancer in her mother; Diabetes in her mother; Heart Disease in her father; Other in her father. The patients home medications have been reviewed.     Allergies: Penicillins    -------------------------------------------------- RESULTS -------------------------------------------------  Labs:  Results for orders placed or performed during the hospital encounter of 05/25/19   Rapid influenza A/B antigens   Result Value Ref Range Influenza A by PCR Not Detected Not Detected    Influenza B by PCR Not Detected Not Detected   CBC Auto Differential   Result Value Ref Range    WBC 6.6 4.5 - 11.5 E9/L    RBC 4.51 3.50 - 5.50 E12/L    Hemoglobin 12.9 11.5 - 15.5 g/dL    Hematocrit 38.8 34.0 - 48.0 %    MCV 86.0 80.0 - 99.9 fL    MCH 28.6 26.0 - 35.0 pg    MCHC 33.2 32.0 - 34.5 %    RDW 14.2 11.5 - 15.0 fL    Platelets 349 152 - 803 E9/L    MPV 10.6 7.0 - 12.0 fL    Neutrophils % 87.8 (H) 43.0 - 80.0 %    Lymphocytes % 6.1 (L) 20.0 - 42.0 %    Monocytes % 6.1 2.0 - 12.0 %    Eosinophils % 0.0 0.0 - 6.0 %    Basophils % 0.3 0.0 - 2.0 %    Neutrophils # 5.81 1.80 - 7.30 E9/L    Lymphocytes # 0.40 (L) 1.50 - 4.00 E9/L    Monocytes # 0.40 0.10 - 0.95 E9/L    Eosinophils # 0.00 (L) 0.05 - 0.50 E9/L    Basophils # 0.00 0.00 - 0.20 E9/L   Comprehensive Metabolic Panel w/ Reflex to MG   Result Value Ref Range    Sodium 135 132 - 146 mmol/L    Potassium reflex Magnesium 3.0 (L) 3.5 - 5.0 mmol/L    Chloride 97 (L) 98 - 107 mmol/L    CO2 26 22 - 29 mmol/L    Anion Gap 12 7 - 16 mmol/L    Glucose 194 (H) 74 - 99 mg/dL    BUN 17 8 - 23 mg/dL    CREATININE 0.7 0.5 - 1.0 mg/dL    GFR Non-African American >60 >=60 mL/min/1.73    GFR African American >60     Calcium 8.6 8.6 - 10.2 mg/dL    Total Protein 7.2 6.4 - 8.3 g/dL    Alb 3.6 3.5 - 5.2 g/dL    Total Bilirubin 1.0 0.0 - 1.2 mg/dL    Alkaline Phosphatase 117 (H) 35 - 104 U/L    ALT 32 0 - 32 U/L    AST 26 0 - 31 U/L   Lipase   Result Value Ref Range    Lipase 8 (L) 13 - 60 U/L   Troponin   Result Value Ref Range    Troponin <0.01 0.00 - 0.03 ng/mL   TSH without Reflex   Result Value Ref Range    TSH 2.410 0.270 - 4.200 uIU/mL   Urinalysis, reflex to microscopic   Result Value Ref Range    Color, UA Yellow Straw/Yellow    Clarity, UA Clear Clear    Glucose, Ur Negative Negative mg/dL    Bilirubin Urine Negative Negative    Ketones, Urine Negative Negative mg/dL    Specific Gravity, UA 1.010 1.005 - 1.030 Blood, Urine MODERATE (A) Negative    pH, UA 6.0 5.0 - 9.0    Protein,  (A) Negative mg/dL    Urobilinogen, Urine 2.0 (A) <2.0 E.U./dL    Nitrite, Urine Negative Negative    Leukocyte Esterase, Urine SMALL (A) Negative   Microscopic Urinalysis   Result Value Ref Range    WBC, UA 2-5 0 - 5 /HPF    RBC, UA 2-5 0 - 2 /HPF    Epi Cells FEW /HPF    Bacteria, UA FEW (A) /HPF   Magnesium   Result Value Ref Range    Magnesium 2.0 1.6 - 2.6 mg/dL       Radiology:  CT HEAD W WO CONTRAST   Final Result      No hemorrhage or mass. XR CHEST STANDARD (2 VW)   Final Result   1. No active cardiopulmonary disease.             ------------------------- NURSING NOTES AND VITALS REVIEWED ---------------------------  Date / Time Roomed:  5/25/2019  9:28 AM  ED Bed Assignment:  [id number]    The nursing notes within the ED encounter and vital signs as below have been reviewed.    BP (!) 147/73   Pulse 96   Temp 99.9 °F (37.7 °C) (Oral)   Resp 17   Ht 6' (1.829 m)   Wt (!) 350 lb (158.8 kg)   SpO2 95%   BMI 47.47 kg/m²   Oxygen Saturation Interpretation: Normal      ------------------------------------------ PROGRESS NOTES ------------------------------------------  ED COURSE MEDICATIONS:                Medications   acetaminophen (TYLENOL) tablet 650 mg (650 mg Oral Given 5/25/19 0953)   0.9 % sodium chloride bolus (0 mLs Intravenous Stopped 5/25/19 1219)   ondansetron (ZOFRAN) injection 4 mg (4 mg Intravenous Given 5/25/19 0953)   potassium chloride (KLOR-CON M) extended release tablet 40 mEq (40 mEq Oral Given 5/25/19 1114)   potassium chloride 10 mEq/100 mL IVPB (Peripheral Line) (0 mEq Intravenous Stopped 5/25/19 1244)   ketorolac (TORADOL) injection 15 mg (15 mg Intravenous Given 5/25/19 1115)   iopamidol (ISOVUE-370) 76 % injection 50 mL (50 mLs Intravenous Given 5/25/19 1203)   0.9 % sodium chloride bolus (1,000 mLs Intravenous New Bag 5/25/19 1244)   metoclopramide (REGLAN) injection 10 mg (10 mg Intravenous Given 5/25/19 1244)   diphenhydrAMINE (BENADRYL) injection 25 mg (25 mg Intravenous Given 5/25/19 1244)       I have spoken with the patient and discussed todays results, in addition to providing specific details for the plan of care and counseling regarding the diagnosis and prognosis. Their questions are answered at this time and they are agreeable with the plan. I discussed at length with them reasons for immediate return here for re evaluation. They will followup with primary care by calling their office tomorrow. --------------------------------- ADDITIONAL PROVIDER NOTES ---------------------------------  At this time the patient is without objective evidence of an acute process requiring hospitalization or inpatient management. They have remained hemodynamically stable throughout their entire ED visit and are stable for discharge with outpatient follow-up. The plan has been discussed in detail and they are aware of the specific conditions for emergent return, as well as the importance of follow-up. New Prescriptions    No medications on file       Diagnosis:  1. Nausea and vomiting, intractability of vomiting not specified, unspecified vomiting type    2. Other fatigue    3. Nonintractable headache, unspecified chronicity pattern, unspecified headache type    4. Hypokalemia        Disposition:  Patient's disposition: Discharge to home  Patient's condition is stable.          Odell Thrasher DO  Resident  05/25/19 [id number]

## 2019-05-25 NOTE — ED NOTES
Bed: 05  Expected date:   Expected time:   Means of arrival:   Comments:  TORIBIO Garza RN  05/25/19 [id number]

## 2019-05-26 ENCOUNTER — APPOINTMENT (OUTPATIENT)
Dept: GENERAL RADIOLOGY | Age: 64
DRG: 872 | End: 2019-05-26
Payer: MEDICARE

## 2019-05-26 ENCOUNTER — HOSPITAL ENCOUNTER (INPATIENT)
Age: 64
LOS: 2 days | Discharge: HOME | DRG: 872 | End: 2019-05-28
Attending: EMERGENCY MEDICINE | Admitting: INTERNAL MEDICINE
Payer: MEDICARE

## 2019-05-26 VITALS
HEART RATE: 78 BPM | RESPIRATION RATE: 16 BRPM | SYSTOLIC BLOOD PRESSURE: 116 MMHG | TEMPERATURE: 99.4 F | DIASTOLIC BLOOD PRESSURE: 99 MMHG | OXYGEN SATURATION: 99 %

## 2019-05-26 DIAGNOSIS — R51.9 NONINTRACTABLE HEADACHE, UNSPECIFIED CHRONICITY PATTERN, UNSPECIFIED HEADACHE TYPE: ICD-10-CM

## 2019-05-26 DIAGNOSIS — R11.2 NON-INTRACTABLE VOMITING WITH NAUSEA, UNSPECIFIED VOMITING TYPE: ICD-10-CM

## 2019-05-26 DIAGNOSIS — R50.9 FEVER, UNSPECIFIED FEVER CAUSE: Primary | ICD-10-CM

## 2019-05-26 DIAGNOSIS — R78.81 POSITIVE BLOOD CULTURE: ICD-10-CM

## 2019-05-26 LAB
ANION GAP SERPL CALCULATED.3IONS-SCNC: 12 MMOL/L (ref 7–16)
BACTERIA: ABNORMAL /HPF
BACTERIA: ABNORMAL /HPF
BASOPHILS ABSOLUTE: 0 E9/L (ref 0–0.2)
BASOPHILS RELATIVE PERCENT: 0.1 % (ref 0–2)
BILIRUBIN URINE: NEGATIVE
BILIRUBIN URINE: NEGATIVE
BLOOD, URINE: ABNORMAL
BLOOD, URINE: ABNORMAL
BUN BLDV-MCNC: 14 MG/DL (ref 8–23)
BURR CELLS: ABNORMAL
CALCIUM SERPL-MCNC: 8.3 MG/DL (ref 8.6–10.2)
CHLORIDE BLD-SCNC: 97 MMOL/L (ref 98–107)
CHP ED QC CHECK: YES
CLARITY: CLEAR
CLARITY: CLEAR
CO2: 24 MMOL/L (ref 22–29)
COLOR: YELLOW
COLOR: YELLOW
CREAT SERPL-MCNC: 0.7 MG/DL (ref 0.5–1)
EKG ATRIAL RATE: 83 BPM
EKG ATRIAL RATE: 96 BPM
EKG P AXIS: 49 DEGREES
EKG P AXIS: 50 DEGREES
EKG P-R INTERVAL: 144 MS
EKG P-R INTERVAL: 154 MS
EKG Q-T INTERVAL: 368 MS
EKG Q-T INTERVAL: 414 MS
EKG QRS DURATION: 102 MS
EKG QRS DURATION: 98 MS
EKG QTC CALCULATION (BAZETT): 464 MS
EKG QTC CALCULATION (BAZETT): 486 MS
EKG R AXIS: 12 DEGREES
EKG R AXIS: 15 DEGREES
EKG T AXIS: 33 DEGREES
EKG T AXIS: 45 DEGREES
EKG VENTRICULAR RATE: 83 BPM
EKG VENTRICULAR RATE: 96 BPM
EOSINOPHILS ABSOLUTE: 0.06 E9/L (ref 0.05–0.5)
EOSINOPHILS RELATIVE PERCENT: 0.9 % (ref 0–6)
EPITHELIAL CELLS, UA: ABNORMAL /HPF
EPITHELIAL CELLS, UA: ABNORMAL /HPF
GFR AFRICAN AMERICAN: >60
GFR NON-AFRICAN AMERICAN: >60 ML/MIN/1.73
GLUCOSE BLD-MCNC: 132 MG/DL
GLUCOSE BLD-MCNC: 141 MG/DL (ref 74–99)
GLUCOSE URINE: NEGATIVE MG/DL
GLUCOSE URINE: NEGATIVE MG/DL
HCT VFR BLD CALC: 32.9 % (ref 34–48)
HEMOGLOBIN: 11.1 G/DL (ref 11.5–15.5)
KETONES, URINE: NEGATIVE MG/DL
KETONES, URINE: NEGATIVE MG/DL
LACTIC ACID: 0.8 MMOL/L (ref 0.5–2.2)
LEUKOCYTE ESTERASE, URINE: ABNORMAL
LEUKOCYTE ESTERASE, URINE: NEGATIVE
LYMPHOCYTES ABSOLUTE: 0.27 E9/L (ref 1.5–4)
LYMPHOCYTES RELATIVE PERCENT: 3.5 % (ref 20–42)
MAGNESIUM: 2 MG/DL (ref 1.6–2.6)
MCH RBC QN AUTO: 29.1 PG (ref 26–35)
MCHC RBC AUTO-ENTMCNC: 33.7 % (ref 32–34.5)
MCV RBC AUTO: 86.1 FL (ref 80–99.9)
METER GLUCOSE: 132 MG/DL (ref 74–99)
MONOCYTES ABSOLUTE: 0.34 E9/L (ref 0.1–0.95)
MONOCYTES RELATIVE PERCENT: 5.2 % (ref 2–12)
NEUTROPHILS ABSOLUTE: 6.03 E9/L (ref 1.8–7.3)
NEUTROPHILS RELATIVE PERCENT: 90.4 % (ref 43–80)
NITRITE, URINE: NEGATIVE
NITRITE, URINE: NEGATIVE
OVALOCYTES: ABNORMAL
PDW BLD-RTO: 14.8 FL (ref 11.5–15)
PH UA: 6 (ref 5–9)
PH UA: 6.5 (ref 5–9)
PLATELET # BLD: 143 E9/L (ref 130–450)
PMV BLD AUTO: 10.9 FL (ref 7–12)
POIKILOCYTES: ABNORMAL
POLYCHROMASIA: ABNORMAL
POTASSIUM REFLEX MAGNESIUM: 3 MMOL/L (ref 3.5–5)
PROTEIN UA: 30 MG/DL
PROTEIN UA: ABNORMAL MG/DL
RBC # BLD: 3.82 E12/L (ref 3.5–5.5)
RBC UA: ABNORMAL /HPF (ref 0–2)
RBC UA: ABNORMAL /HPF (ref 0–2)
SODIUM BLD-SCNC: 133 MMOL/L (ref 132–146)
SPECIFIC GRAVITY UA: 1.01 (ref 1–1.03)
SPECIFIC GRAVITY UA: <=1.005 (ref 1–1.03)
TROPONIN: <0.01 NG/ML (ref 0–0.03)
UROBILINOGEN, URINE: 1 E.U./DL
UROBILINOGEN, URINE: 1 E.U./DL
WBC # BLD: 6.7 E9/L (ref 4.5–11.5)
WBC UA: ABNORMAL /HPF (ref 0–5)
WBC UA: ABNORMAL /HPF (ref 0–5)

## 2019-05-26 PROCEDURE — 6360000002 HC RX W HCPCS: Performed by: EMERGENCY MEDICINE

## 2019-05-26 PROCEDURE — 99285 EMERGENCY DEPT VISIT HI MDM: CPT

## 2019-05-26 PROCEDURE — 87040 BLOOD CULTURE FOR BACTERIA: CPT

## 2019-05-26 PROCEDURE — 82962 GLUCOSE BLOOD TEST: CPT

## 2019-05-26 PROCEDURE — 1200000000 HC SEMI PRIVATE

## 2019-05-26 PROCEDURE — 6370000000 HC RX 637 (ALT 250 FOR IP): Performed by: EMERGENCY MEDICINE

## 2019-05-26 PROCEDURE — 93010 ELECTROCARDIOGRAM REPORT: CPT | Performed by: INTERNAL MEDICINE

## 2019-05-26 PROCEDURE — 81001 URINALYSIS AUTO W/SCOPE: CPT

## 2019-05-26 PROCEDURE — 83735 ASSAY OF MAGNESIUM: CPT

## 2019-05-26 PROCEDURE — 93005 ELECTROCARDIOGRAM TRACING: CPT | Performed by: EMERGENCY MEDICINE

## 2019-05-26 PROCEDURE — 2580000003 HC RX 258: Performed by: INTERNAL MEDICINE

## 2019-05-26 PROCEDURE — 36415 COLL VENOUS BLD VENIPUNCTURE: CPT

## 2019-05-26 PROCEDURE — 96374 THER/PROPH/DIAG INJ IV PUSH: CPT

## 2019-05-26 PROCEDURE — 83605 ASSAY OF LACTIC ACID: CPT

## 2019-05-26 PROCEDURE — 71046 X-RAY EXAM CHEST 2 VIEWS: CPT

## 2019-05-26 PROCEDURE — 6360000002 HC RX W HCPCS: Performed by: INTERNAL MEDICINE

## 2019-05-26 PROCEDURE — 6370000000 HC RX 637 (ALT 250 FOR IP): Performed by: INTERNAL MEDICINE

## 2019-05-26 PROCEDURE — 009U3ZX DRAINAGE OF SPINAL CANAL, PERCUTANEOUS APPROACH, DIAGNOSTIC: ICD-10-PCS | Performed by: EMERGENCY MEDICINE

## 2019-05-26 PROCEDURE — 2580000003 HC RX 258: Performed by: EMERGENCY MEDICINE

## 2019-05-26 PROCEDURE — 84484 ASSAY OF TROPONIN QUANT: CPT

## 2019-05-26 PROCEDURE — 80048 BASIC METABOLIC PNL TOTAL CA: CPT

## 2019-05-26 PROCEDURE — 85025 COMPLETE CBC W/AUTO DIFF WBC: CPT

## 2019-05-26 PROCEDURE — 62270 DX LMBR SPI PNXR: CPT

## 2019-05-26 RX ORDER — POTASSIUM CHLORIDE 20 MEQ/1
40 TABLET, EXTENDED RELEASE ORAL ONCE
Status: COMPLETED | OUTPATIENT
Start: 2019-05-26 | End: 2019-05-26

## 2019-05-26 RX ORDER — KETOROLAC TROMETHAMINE 15 MG/ML
15 INJECTION, SOLUTION INTRAMUSCULAR; INTRAVENOUS EVERY 6 HOURS PRN
Status: DISCONTINUED | OUTPATIENT
Start: 2019-05-26 | End: 2019-05-28 | Stop reason: HOSPADM

## 2019-05-26 RX ORDER — SODIUM CHLORIDE 0.9 % (FLUSH) 0.9 %
10 SYRINGE (ML) INJECTION EVERY 12 HOURS SCHEDULED
Status: DISCONTINUED | OUTPATIENT
Start: 2019-05-26 | End: 2019-05-28 | Stop reason: HOSPADM

## 2019-05-26 RX ORDER — ONDANSETRON 2 MG/ML
4 INJECTION INTRAMUSCULAR; INTRAVENOUS ONCE
Status: COMPLETED | OUTPATIENT
Start: 2019-05-26 | End: 2019-05-26

## 2019-05-26 RX ORDER — POTASSIUM CHLORIDE 7.45 MG/ML
10 INJECTION INTRAVENOUS ONCE
Status: DISCONTINUED | OUTPATIENT
Start: 2019-05-26 | End: 2019-05-26 | Stop reason: ALTCHOICE

## 2019-05-26 RX ORDER — CEFDINIR 300 MG/1
300 CAPSULE ORAL 2 TIMES DAILY
Qty: 20 CAPSULE | Refills: 0 | Status: ON HOLD | OUTPATIENT
Start: 2019-05-26 | End: 2019-05-26

## 2019-05-26 RX ORDER — POLYVINYL ALCOHOL 14 MG/ML
1 SOLUTION/ DROPS OPHTHALMIC PRN
Status: DISCONTINUED | OUTPATIENT
Start: 2019-05-26 | End: 2019-05-28 | Stop reason: HOSPADM

## 2019-05-26 RX ORDER — SODIUM CHLORIDE 9 MG/ML
INJECTION, SOLUTION INTRAVENOUS CONTINUOUS
Status: DISCONTINUED | OUTPATIENT
Start: 2019-05-26 | End: 2019-05-27

## 2019-05-26 RX ORDER — LEVOTHYROXINE SODIUM 0.12 MG/1
125 TABLET ORAL DAILY
Status: DISCONTINUED | OUTPATIENT
Start: 2019-05-26 | End: 2019-05-28 | Stop reason: HOSPADM

## 2019-05-26 RX ORDER — SODIUM CHLORIDE 0.9 % (FLUSH) 0.9 %
10 SYRINGE (ML) INJECTION PRN
Status: DISCONTINUED | OUTPATIENT
Start: 2019-05-26 | End: 2019-05-28 | Stop reason: HOSPADM

## 2019-05-26 RX ORDER — ACETAMINOPHEN 325 MG/1
650 TABLET ORAL EVERY 4 HOURS PRN
Status: DISCONTINUED | OUTPATIENT
Start: 2019-05-26 | End: 2019-05-28 | Stop reason: HOSPADM

## 2019-05-26 RX ORDER — 0.9 % SODIUM CHLORIDE 0.9 %
1000 INTRAVENOUS SOLUTION INTRAVENOUS ONCE
Status: COMPLETED | OUTPATIENT
Start: 2019-05-26 | End: 2019-05-26

## 2019-05-26 RX ORDER — FOLIC ACID 1 MG/1
1 TABLET ORAL DAILY
Status: DISCONTINUED | OUTPATIENT
Start: 2019-05-26 | End: 2019-05-28 | Stop reason: HOSPADM

## 2019-05-26 RX ORDER — ACETAMINOPHEN 325 MG/1
650 TABLET ORAL ONCE
Status: COMPLETED | OUTPATIENT
Start: 2019-05-26 | End: 2019-05-26

## 2019-05-26 RX ORDER — PANTOPRAZOLE SODIUM 40 MG/1
40 TABLET, DELAYED RELEASE ORAL
Status: DISCONTINUED | OUTPATIENT
Start: 2019-05-27 | End: 2019-05-28 | Stop reason: HOSPADM

## 2019-05-26 RX ADMIN — LEVOTHYROXINE SODIUM 125 MCG: 125 TABLET ORAL at 14:40

## 2019-05-26 RX ADMIN — POTASSIUM CHLORIDE 40 MEQ: 20 TABLET, EXTENDED RELEASE ORAL at 11:34

## 2019-05-26 RX ADMIN — ACETAMINOPHEN 650 MG: 325 TABLET, FILM COATED ORAL at 10:17

## 2019-05-26 RX ADMIN — FOLIC ACID 1 MG: 1 TABLET ORAL at 14:40

## 2019-05-26 RX ADMIN — KETOROLAC TROMETHAMINE 15 MG: 15 INJECTION, SOLUTION INTRAMUSCULAR; INTRAVENOUS at 23:54

## 2019-05-26 RX ADMIN — SERTRALINE HYDROCHLORIDE 100 MG: 50 TABLET ORAL at 14:40

## 2019-05-26 RX ADMIN — SODIUM CHLORIDE: 9 INJECTION, SOLUTION INTRAVENOUS at 14:31

## 2019-05-26 RX ADMIN — Medication 10 ML: at 13:20

## 2019-05-26 RX ADMIN — WATER 2 G: 1 INJECTION INTRAMUSCULAR; INTRAVENOUS; SUBCUTANEOUS at 13:20

## 2019-05-26 RX ADMIN — SODIUM CHLORIDE 1000 ML: 9 INJECTION, SOLUTION INTRAVENOUS at 10:19

## 2019-05-26 RX ADMIN — ONDANSETRON 4 MG: 2 INJECTION INTRAMUSCULAR; INTRAVENOUS at 10:17

## 2019-05-26 ASSESSMENT — PAIN DESCRIPTION - FREQUENCY: FREQUENCY: CONTINUOUS

## 2019-05-26 ASSESSMENT — PAIN SCALES - GENERAL
PAINLEVEL_OUTOF10: 9
PAINLEVEL_OUTOF10: 8
PAINLEVEL_OUTOF10: 8
PAINLEVEL_OUTOF10: 0
PAINLEVEL_OUTOF10: 0

## 2019-05-26 ASSESSMENT — ENCOUNTER SYMPTOMS
DIARRHEA: 0
NAUSEA: 1
SHORTNESS OF BREATH: 0
ABDOMINAL PAIN: 0
COUGH: 0
SORE THROAT: 0
SINUS PAIN: 0
CHEST TIGHTNESS: 0
BACK PAIN: 0
VOMITING: 1

## 2019-05-26 ASSESSMENT — PAIN DESCRIPTION - DESCRIPTORS
DESCRIPTORS: ACHING;CONSTANT;DISCOMFORT
DESCRIPTORS: ACHING

## 2019-05-26 ASSESSMENT — PAIN DESCRIPTION - LOCATION
LOCATION: HEAD
LOCATION: BACK;HIP

## 2019-05-26 ASSESSMENT — PAIN DESCRIPTION - PAIN TYPE: TYPE: ACUTE PAIN

## 2019-05-26 ASSESSMENT — PAIN DESCRIPTION - ORIENTATION: ORIENTATION: LOWER;RIGHT;LEFT

## 2019-05-26 NOTE — ED NOTES
Pt is alert iv infusing medicated per order with zofran and tylenol for headache , color is good and skin is warm and dry, daughter at bedside pt to x ray now .      Kelechi Keene RN  05/26/19 [id number]

## 2019-05-26 NOTE — ED PROVIDER NOTES
This is a 76-year-old female with a past medical history of rheumatoid arthritis on methotrexate, GERD, hypothyroidism, anxiety and depression presents for her 3rd emergency department visit in 2 days for fever, nausea and emesis. She was seen yesterday morning for similar symptoms and felt better after Tylenol, Toradol and a headache cocktail. She returned later in the evening and was found to have pyelonephritis on CT scan, received a dose of Rocephin, felt better and was sent home. On both visits she refused a lumbar puncture. Today, the patient states that she and her daughter discussed her symptoms more and they felt like she actually needed to have the lumbar puncture performed so they called 911 to have her brought into the hospital for this to be done. She states she is again having a headache and now she is having neck pain. Review of Systems   Constitutional: Positive for fever. Negative for activity change, chills and fatigue. HENT: Negative for congestion, sinus pain and sore throat. Eyes: Negative for visual disturbance. Respiratory: Negative for cough, chest tightness and shortness of breath. Cardiovascular: Negative for chest pain, palpitations and leg swelling. Gastrointestinal: Positive for nausea and vomiting. Negative for abdominal pain and diarrhea. Genitourinary: Negative for dysuria and urgency. Musculoskeletal: Negative for back pain, neck pain and neck stiffness. Skin: Negative for rash. Neurological: Positive for headaches. Negative for dizziness, syncope and light-headedness. Psychiatric/Behavioral: Negative for confusion. Physical Exam   Constitutional: She is oriented to person, place, and time. She appears well-developed and well-nourished. No distress. HENT:   Head: Normocephalic and atraumatic. Nose: Nose normal.   Mouth/Throat: Oropharynx is clear and moist. No oropharyngeal exudate. Eyes: Pupils are equal, round, and reactive to light. culture:   Diagnosis management comments: This is a 40-year-old female on her 2rd emergency department visit in 2 days for fever, nausea and emesis. Currently, she is nontoxic appearing with stable vital signs. States she did have a fever this morning prior to taking Aleve although she cannot remember how high her temperature was, she thinks 101 or 102°. She is asking to have the lumbar puncture performed as she has continued to have a fever, nausea and emesis despite being seen and treated in the department. We'll obtain labs, repeat imaging and plan to perform lumbar puncture as she is immunocompromised on methotrexate for her rheumatoid arthritis      ED Course as of May 26 2019   Sun May 26, 2019   3402 ATTENDING PROVIDER ATTESTATION:     I have personally performed and/or participated in the history, exam, medical decision making, and procedures and agree with all pertinent clinical information unless otherwise noted. I have also reviewed and agree with the past medical, family and social history unless otherwise noted. I have discussed this patient in detail with the resident, and provided the instruction and education regarding patient here complaining of persistent fevers since yesterday. Some intermittent headaches some nausea. Arrives by ambulance sitting up and drinking a large [employer]'s cup full of water. States she was seen here twice yesterday and mildly diagnosed with a possible kidney infection and placed on antibiotics but states she was still symptomatic they didn't feel well so she states her daughter made her come in. Denies current headache or stiff neck. Denies current abdominal pain. .  My findings/plan: Patient sitting the bed resting comfortably in no distress. Heart rate regular, lungs are clear anteriorly. Abdomen soft and nontender. No current jaundice or icterus. No CVA tenderness. She has no jaundice or icterus and no meningeal signs. No focal neurologic deficit. Oriented ×3. Cancer in her mother; Diabetes in her mother; Heart Disease in her father; Other in her father. The patients home medications have been reviewed.     Allergies: Penicillins    ------------------------------------------------ RESULTS ---------------------------------------------------    LABS:  Results for orders placed or performed during the hospital encounter of 05/26/19   Lactic Acid, Plasma   Result Value Ref Range    Lactic Acid 0.8 0.5 - 2.2 mmol/L   CBC Auto Differential   Result Value Ref Range    WBC 6.7 4.5 - 11.5 E9/L    RBC 3.82 3.50 - 5.50 E12/L    Hemoglobin 11.1 (L) 11.5 - 15.5 g/dL    Hematocrit 32.9 (L) 34.0 - 48.0 %    MCV 86.1 80.0 - 99.9 fL    MCH 29.1 26.0 - 35.0 pg    MCHC 33.7 32.0 - 34.5 %    RDW 14.8 11.5 - 15.0 fL    Platelets 760 458 - 147 E9/L    MPV 10.9 7.0 - 12.0 fL    Neutrophils % 90.4 (H) 43.0 - 80.0 %    Lymphocytes % 3.5 (L) 20.0 - 42.0 %    Monocytes % 5.2 2.0 - 12.0 %    Eosinophils % 0.9 0.0 - 6.0 %    Basophils % 0.1 0.0 - 2.0 %    Neutrophils # 6.03 1.80 - 7.30 E9/L    Lymphocytes # 0.27 (L) 1.50 - 4.00 E9/L    Monocytes # 0.34 0.10 - 0.95 E9/L    Eosinophils # 0.06 0.05 - 0.50 E9/L    Basophils # 0.00 0.00 - 0.20 E9/L    Polychromasia 1+     Poikilocytes 1+     Asim Cells 1+     Ovalocytes 1+    Basic Metabolic Panel w/ Reflex to MG   Result Value Ref Range    Sodium 133 132 - 146 mmol/L    Potassium reflex Magnesium 3.0 (L) 3.5 - 5.0 mmol/L    Chloride 97 (L) 98 - 107 mmol/L    CO2 24 22 - 29 mmol/L    Anion Gap 12 7 - 16 mmol/L    Glucose 141 (H) 74 - 99 mg/dL    BUN 14 8 - 23 mg/dL    CREATININE 0.7 0.5 - 1.0 mg/dL    GFR Non-African American >60 >=60 mL/min/1.73    GFR African American >60     Calcium 8.3 (L) 8.6 - 10.2 mg/dL   Troponin   Result Value Ref Range    Troponin <0.01 0.00 - 0.03 ng/mL   Urinalysis, reflex to microscopic   Result Value Ref Range    Color, UA Yellow Straw/Yellow    Clarity, UA Clear Clear    Glucose, Ur Negative Negative mg/dL    Bilirubin Urine Negative Negative    Ketones, Urine Negative Negative mg/dL    Specific Gravity, UA 1.010 1.005 - 1.030    Blood, Urine MODERATE (A) Negative    pH, UA 6.0 5.0 - 9.0    Protein, UA 30 (A) Negative mg/dL    Urobilinogen, Urine 1.0 <2.0 E.U./dL    Nitrite, Urine Negative Negative    Leukocyte Esterase, Urine TRACE (A) Negative   Magnesium   Result Value Ref Range    Magnesium 2.0 1.6 - 2.6 mg/dL   Microscopic Urinalysis   Result Value Ref Range    WBC, UA 5-10 0 - 5 /HPF    RBC, UA 2-5 0 - 2 /HPF    Epi Cells FEW /HPF    Bacteria, UA FEW (A) /HPF   POCT Glucose   Result Value Ref Range    Glucose 132 mg/dL    QC OK? yes    POCT Glucose   Result Value Ref Range    Meter Glucose 132 (H) 74 - 99 mg/dL   EKG 12 Lead   Result Value Ref Range    Ventricular Rate 83 BPM    Atrial Rate 83 BPM    P-R Interval 154 ms    QRS Duration 102 ms    Q-T Interval 414 ms    QTc Calculation (Bazett) 486 ms    P Axis 49 degrees    R Axis 15 degrees    T Axis 45 degrees       RADIOLOGY:    All Radiology results interpreted by Radiologist unless otherwise noted. XR CHEST STANDARD (2 VW)   Final Result   1. Slightly limited level of inspiration. 2. Mild left midlung linear scar. 3. No acute cardiopulmonary disease or significant change since   previous study of 5/25/2019                  ---------------------------- NURSING NOTES AND VITALS REVIEWED -------------------------   The nursing notes within the ED encounter and vital signs as below have been reviewed.    /83   Pulse 68   Temp 98.1 °F (36.7 °C)   Resp 16   Ht 6' (1.829 m)   Wt (!) 340 lb 9 oz (154.5 kg)   SpO2 96%   BMI 46.19 kg/m²   Oxygen Saturation Interpretation: Normal      ------------------------------------------PROGRESS NOTES -------------------------------------------    ED COURSE MEDICATIONS:                Medications   sodium chloride flush 0.9 % injection 10 mL (10 mLs Intravenous Not Given 5/26/19 2014)   sodium chloride flush 0.9 % injection 10 mL (has no administration in time range)   cefTRIAXone (ROCEPHIN) 2 g in sterile water 20 mL IV syringe (has no administration in time range)   folic acid (FOLVITE) tablet 1 mg (1 mg Oral Given 5/26/19 1440)   levothyroxine (SYNTHROID) tablet 125 mcg (125 mcg Oral Given 5/26/19 1440)   pantoprazole (PROTONIX) tablet 40 mg (has no administration in time range)   sertraline (ZOLOFT) tablet 100 mg (100 mg Oral Given 5/26/19 1440)   polyvinyl alcohol (LIQUIFILM TEARS) 1.4 % ophthalmic solution 1 drop (has no administration in time range)   sodium chloride flush 0.9 % injection 10 mL (10 mLs Intravenous Not Given 5/26/19 2014)   sodium chloride flush 0.9 % injection 10 mL (has no administration in time range)   trimethobenzamide (TIGAN) injection 200 mg (has no administration in time range)   acetaminophen (TYLENOL) tablet 650 mg (has no administration in time range)   0.9 % sodium chloride infusion ( Intravenous New Bag 5/26/19 1431)   0.9 % sodium chloride bolus (0 mLs Intravenous Stopped 5/26/19 1254)   acetaminophen (TYLENOL) tablet 650 mg (650 mg Oral Given 5/26/19 1017)   ondansetron (ZOFRAN) injection 4 mg (4 mg Intravenous Given 5/26/19 1017)   potassium chloride (KLOR-CON M) extended release tablet 40 mEq (40 mEq Oral Given 5/26/19 1134)   cefTRIAXone (ROCEPHIN) 2 g in sterile water 20 mL IV syringe (2 g Intravenous Given 5/26/19 1320)       CONSULTATIONS:            none. PROCEDURES:            LP.      COUNSELING:   I have spoken with the daughter and patient and discussed todays results, in addition to providing specific details for the plan of care and counseling regarding the diagnosis and prognosis.     --------------------------------------- IMPRESSION & DISPOSITION --------------------------------     IMPRESSION(s):  1. Fever, unspecified fever cause    2. Positive blood culture    3. Non-intractable vomiting with nausea, unspecified vomiting type    4.  Nonintractable headache, unspecified chronicity pattern, unspecified headache type        This patient's ED course included: a personal history and physicial examination    This patient has remained hemodynamically stable and been closely monitored during their ED course. DISPOSITION:  Disposition: Admit to telemetry. Patient condition is fair. END OF PROVIDER NOTE.          Ophelia Tian DO  Resident  05/26/19 [address],   Resident  05/26/19 [id number]

## 2019-05-26 NOTE — ED PROVIDER NOTES
Patient is a 25-year-old female with history of GERD, hypothyroidism, rheumatoid arthritis on enbrel, and methotrexate who presents to the ED with fatigue, and altered mental status. Patient was recently seen this morning in the ED, for worsening fatigue. At that time, the patient's daughter was present, and wanted topatient had a UTI. Patient was offered an LP as well at that time, however patient declined. Daughter states that the patient was discharged, and had worsening symptoms, was not acting her usual self. Daughter states that she brought the patient back to the ED for further evaluation. Patient continued to have a fever today. Lab work that was drawn this morning did not show any obvious signs of infection. TSH was normal, blood cultures were obtained. Flu was negative, head CT was negative, chest x-ray was negative. Patient currently complains of a mild headache that is in the front, and some neck pain as well. She states that her neck pain is somewhat chronic. She does have a history of diverticulitis with a previous colectomy with colostomy bag a few years ago, with reversal. Daughter also states that the patient has been on Enbrel for the last couple of months. She states that the patient took a dose of Enbrel on Monday, and since then has not been acting right. She thinks that it may be related to the medication. NIH Stroke Scale/Score at time of initial evaluation:  1A: Level of Consciousness 0 - alert; keenly responsive  1B: Ask Month and Age 0 - answers both questions correctly  1C:  Tell Patient To Open and Close Eyes, then Hand  Squeeze 0 - performs both tasks correctly  2: Test Horizontal Extraocular Movements 0 - normal  3: Test Visual Fields 0 - no visual loss  4: Test Facial Palsy 0 - normal symmetric movement  5A: Test Left Arm Motor Drift 0 - no drift, limb holds 90 (or 45) degrees for full 10 seconds  5B: Test Right Arm Motor Drift 0 - no drift, limb holds 90 (or 45) degrees for full 10 seconds  6A: Test Left Leg Motor Drift 0 - no drift; leg holds 30 degree position for full 5 seconds  6B: Test Right Leg Motor Drift 0 - no drift; leg holds 30 degree position for full 5 seconds  7: Test Limb Ataxia   (FNF/Heel-Shin) 0 - absent  8: Test Sensation 0 - normal; no sensory loss  9: Test Language/Aphasia 0 - no aphasia, normal  10: Test Dysarthria 0 - normal  11: Test Extinction/Inattention 0 - no abnormality  Total Score: 0  5/25/19 at 10:03 PM.      The history is provided by the patient. No  was used. Review of Systems   Constitutional: Positive for fatigue and fever. Negative for chills. Respiratory: Negative for cough, shortness of breath and wheezing. Cardiovascular: Negative for chest pain and palpitations. Gastrointestinal: Negative for abdominal pain, constipation, diarrhea, nausea and vomiting. Genitourinary: Negative for dysuria and hematuria. Musculoskeletal: Negative for neck pain and neck stiffness. Skin: Negative for color change, pallor, rash and wound. Neurological: Positive for headaches. Negative for dizziness, syncope, light-headedness and numbness. Psychiatric/Behavioral: Negative for confusion and decreased concentration. The patient is not nervous/anxious. Physical Exam   Constitutional: She is oriented to person, place, and time. She appears well-developed and well-nourished. No distress. Eyes closed, somewhat slow to respond. Obese female, lethargic. HENT:   Head: Normocephalic and atraumatic. Right Ear: External ear normal.   Left Ear: External ear normal.   Mouth/Throat: No oropharyngeal exudate. Eyes: Pupils are equal, round, and reactive to light. EOM are normal.   Neck: Normal range of motion. No obvious nuchal rigidity. Cardiovascular: Normal rate, regular rhythm, normal heart sounds and intact distal pulses. Exam reveals no gallop and no friction rub. No murmur heard.   Pulmonary/Chest: Effort normal and breath sounds normal. No respiratory distress. She has no wheezes. She has no rales. She exhibits no tenderness. Abdominal: Soft. Bowel sounds are normal. She exhibits no distension and no mass. There is no tenderness. There is no rebound and no guarding. No hernia. Musculoskeletal: Normal range of motion. She exhibits no edema, tenderness or deformity. Lymphadenopathy:     She has no cervical adenopathy. Neurological: She is alert and oriented to person, place, and time. No cranial nerve deficit. Coordination normal.   Skin: Skin is warm and dry. Capillary refill takes less than 2 seconds. No rash noted. She is not diaphoretic. No erythema. No pallor. Psychiatric: She has a normal mood and affect. Her behavior is normal. Judgment and thought content normal.   Nursing note and vitals reviewed. Procedures  --------------------------------------------- PAST HISTORY ---------------------------------------------  Past Medical History:  has a past medical history of Diverticulitis, GERD (gastroesophageal reflux disease), Rheumatoid arthritis involving multiple sites with positive rheumatoid factor ([facility] and Thyroid disease. Past Surgical History:  has a past surgical history that includes Appendectomy; Tonsillectomy; Cholecystectomy, laparoscopic (02/23/2017); other surgical history (04/11/2017); Colonoscopy (09/11/2017); and other surgical history (09/12/2017). Social History:  reports that she quit smoking about 32 years ago. She smoked 0.00 packs per day for 0.00 years. She has never used smokeless tobacco. She reports that she does not drink alcohol or use drugs. Family History: family history includes Breast Cancer in her mother; Diabetes in her mother; Heart Disease in her father; Other in her father. The patients home medications have been reviewed.     Allergies: Penicillins    -------------------------------------------------- RESULTS the plan. I discussed at length with them reasons for immediate return here for re evaluation. They will followup with their primary care physician by calling their office on Monday.      --------------------------------- ADDITIONAL PROVIDER NOTES ---------------------------------  At this time the patient is without objective evidence of an acute process requiring hospitalization or inpatient management. They have remained hemodynamically stable throughout their entire ED visit and are stable for discharge with outpatient follow-up. The plan has been discussed in detail and they are aware of the specific conditions for emergent return, as well as the importance of follow-up. Discharge Medication List as of 5/26/2019 12:40 AM      START taking these medications    Details   cefdinir (OMNICEF) 300 MG capsule Take 1 capsule by mouth 2 times daily for 10 days, Disp-20 capsule, R-0Print             Diagnosis:  1. Pyelonephritis    2. Other fatigue    3. Nausea        Disposition:  Patient's disposition: Discharge to home  Patient's condition is stable. MDM  Number of Diagnoses or Management Options  Nausea: Other fatigue:   Pyelonephritis:   Diagnosis management comments: Patient is a 75-year-old female who presents to the ED for fatigue and fever. Patient was evaluated, did not have any evidence of urinary tract infection, however. Previous urinalysis did show some mild evidence of urinary tract infection. CT scan did show some mild evidence of perinephric stranding. This could be concerning for pyelonephritis at this time. She was otherwise alert, responding to questions appropriately, did not have any focal neurologic deficits. She denies any nuchal rigidity concerning for meningitis, however cannot completely rule out without an LP. Patient was started on Rocephin while in the ED. Shared decision making; discussed the need for potential lumbar puncture, and they refused at this time.  Patient will be discharged with Omnicef at this time. They were advised to follow-up with their PCP. Patient and family were in agreement with discharge at this time. Patient was advised to come back to the ED if her symptoms worsen. Amount and/or Complexity of Data Reviewed  Clinical lab tests: ordered and reviewed  Tests in the radiology section of CPT®: ordered and reviewed        ED Course as of May 26 0249   Sat May 25, 2019   2338 Patient resting comfortably at this time. No other symptoms. Patient did show evidence of perinephric stranding, concerning for pyelonephritis. We'll repeat urinalysis at this time, and given a dose of Rocephin. Patient is responding to questions more appropriately after IV fluids. [KS]   Sun May 26, 2019   0012 Currently awaiting urinalysis. Patient otherwise resting comfortably in no acute distress. [KS]   0028 Patient resting comfortably, alert and oriented. Shared decision making, they were in agreement to have the patient discharged at this time and follow-up with the patient's PCP on Tuesday. [KS]      ED Course User Index  [KS] DO Lopez Montaño,   Resident  05/26/19 [id number]    ATTENDING PROVIDER ATTESTATION:     I have personally performed and/or participated in the history, exam, medical decision making, and procedures and agree with all pertinent clinical information. I have also reviewed and agree with the past medical, family and social history unless otherwise noted. I have discussed this patient in detail with the resident, and provided the instruction and education regarding fever, generalized malaise, headache, and meningitis. My findings/Plan: Febrile. Heart RRR. Lungs CTA bilaterally. Abdomen soft, nontender. Bowel sounds normal. No meningeal signs. Patient refuses lumbar puncture. She is offered admission for pyelonephritis, however refuses. She will be discharged for close outpatient follow up.  She is to return to the ED for any

## 2019-05-26 NOTE — H&P
[address] [facility]  Internal Medicine  -Resident History & Physical-    PCP:  Cate Rizvi DO  Admitting Physician:  Jim Denson DO  Consultants:  Infectious disease  Chief Complaint:    Chief Complaint   Patient presents with    Nausea    Emesis    Fever    Headache       History of Present Illness  Lucille Thao is a 70 y.o. female who presents to [facility] ER complaining of nausea, emesis, fever, headache. Lucille Thao has a past medical history that includes rheumatoid arthritis, hypertension, hypothyroidism, history of diverticulitis s/p bowel resection. Charlie Matson presents to the ER with complaints of nausea, emesis, fever, and headache. She has presented to the ER 3 times in the last 2 days for these complaints. During her 1st visit, she was treated for nausea and discharged home. Hours later, she did return with worsening fatigue. She did continue to have a fever as high as 102. CT of the abdomen and pelvis was performed which showed a mild left perinephric stranding. At this time, blood cultures did return positive for gram-negative rods. It was decided to perform lumbar puncture in the ER, however this was unable to be obtained due to patient habitus. She continues to have a headache. She denies any urinary frequency or burning. ER Course  Upon presentation to the ER, routine labwork was performed which revealed potassium of 3.0, glucose of 141, hemoglobin of 11.1. Imaging results are as outlined below in the Imaging section of this note. EKG revealed NSR. Upon arrival to the ER, patient was 166/92. The patient received 1L NS, zofran, potassium in the emergency room and was admitted to telemetry under the care of Dr. Suze Bateman and Adenike Kong.     Last Hospital Admission - 9/12/17   Abdominal pain, acute    Acute cholecystitis    Diverticulitis of large intestine with abscess without bleeding    Abscess, abdomen (HCC)    S/P colostomy ([facility] [address]    Rheumatoid factor positive    Arthralgia    Neck pain    Pain in both knees    Colostomy in place [facility])    Abdominal adhesions    GERD (gastroesophageal reflux disease)    Essential hypertension    Hypothyroidism    Rheumatoid arthritis involving multiple sites with positive rheumatoid factor ([facility])       Last Echocardiogram -   None     ED TRIAGE VITALS  BP: (!) 116/54, Temp: 98.5 °F (36.9 °C), Pulse: 69, Resp: 20, SpO2: 94 %    Vitals:    05/26/19 0920 05/26/19 1135   BP: (!) 116/92 (!) 116/54   Pulse: 87 69   Resp: 20 20   Temp: 99.2 °F (37.3 °C) 98.5 °F (36.9 °C)   TempSrc: Oral Oral   SpO2: 93% 94%   Weight: (!) 340 lb 9 oz (154.5 kg)    Height: 6' (1.829 m)          Histories  Past Medical History:   Diagnosis Date    Diverticulitis     GERD (gastroesophageal reflux disease)     Rheumatoid arthritis involving multiple sites with positive rheumatoid factor ([facility] [address] 9/12/2017    Thyroid disease      Past Surgical History:   Procedure Laterality Date    APPENDECTOMY      CHOLECYSTECTOMY, LAPAROSCOPIC  02/23/2017    robotic assisted cholecystectomy with IOC    COLONOSCOPY  09/11/2017    OTHER SURGICAL HISTORY  04/11/2017    Laparoscopic Hand Assisted Open Sigmoid Colectomy with Colostomy     OTHER SURGICAL HISTORY  09/12/2017    laparscopic robotic assited colostomy reversed    TONSILLECTOMY       Family History   Problem Relation Age of Onset    Diabetes Mother     Breast Cancer Mother     Other Father     Heart Disease Father        Home Medications  Prior to Admission medications    Medication Sig Start Date End Date Taking?  Authorizing Provider   cefdinir (OMNICEF) 300 MG capsule Take 1 capsule by mouth 2 times daily for 10 days 5/26/19 6/5/19  Romeo Bowman DO   ENBREL MINI 50 MG/ML SOCT inject 50 mg subcutaneously once a week on Monday 5/14/19   Historical Provider, MD   ondansetron (ZOFRAN) 4 MG tablet Take 1 tablet by mouth 3 times daily as needed for Nausea or Vomiting 5/21/19   Gabbie Kelsey, DO   omeprazole (PRILOSEC) 20 MG delayed release capsule take 1 capsule by mouth once daily 19   Tomas Silva DO   sertraline (ZOLOFT) 100 MG tablet take 1 tablet by mouth once daily 19   Tomas Silva DO   levothyroxine (SYNTHROID) 125 MCG tablet take 1 tablet by mouth once daily 19   Tomas Silva DO   albuterol sulfate  (90 Base) MCG/ACT inhaler Inhale 2 puffs into the lungs 4 times daily as needed for Wheezing 19   Miguel Saldana, DO   tears naturale II (CELLUGEL) SOLN ophthalmic solution Place 1 drop into the left eye as needed for Dry Eyes 18   Tomas Silva DO   ibuprofen (ADVIL;MOTRIN) 800 MG tablet  18   Historical Provider, MD   methotrexate (RHEUMATREX) 2.5 MG chemo tablet  18   Historical Provider, MD   folic acid (FOLVITE) 1 MG tablet Take 1 mg by mouth daily    Historical Provider, MD   Cholecalciferol (VITAMIN D) 2000 units TABS tablet Take 1 tablet by mouth daily 10/24/17   Tomas Silva DO   Multiple Vitamins-Minerals (MULTI-VITAMIN GUMMIES PO) Take 2 tablets by mouth daily    Historical Provider, MD       Allergies  Penicillins    Social Hx  Social History     Socioeconomic History    Marital status: Single     Spouse name: Not on file    Number of children: Not on file    Years of education: Not on file    Highest education level: Not on file   Occupational History    Not on file   Social Needs    Financial resource strain: Not on file    Food insecurity:     Worry: Not on file     Inability: Not on file    Transportation needs:     Medical: Not on file     Non-medical: Not on file   Tobacco Use    Smoking status: Former Smoker     Packs/day: 0.00     Years: 0.00     Pack years: 0.00     Last attempt to quit: 1986     Years since quittin.4    Smokeless tobacco: Never Used    Tobacco comment: quit 20 yrs ago   Substance and Sexual Activity    Alcohol use: No    Drug use: No    Sexual activity: Not on file   Lifestyle    Physical activity:     Days per week: Not on file     Minutes per session: Not on file    Stress: Not on file   Relationships    Social connections:     Talks on phone: Not on file     Gets together: Not on file     Attends [employer] service: Not on file     Active member of club or organization: Not on file     Attends meetings of clubs or organizations: Not on file     Relationship status: Not on file    Intimate partner violence:     Fear of current or ex partner: Not on file     Emotionally abused: Not on file     Physically abused: Not on file     Forced sexual activity: Not on file   Other Topics Concern    Not on file   Social History Narrative    Not on file       Review of Systems  All bolded are positive; please see HPI  General:  Fever, chills, diaphoresis, fatigue, malaise, night sweats, weight loss  Psychological:  Anxiety, disorientation, hallucinations. ENT:  Epistaxis, headaches, vertigo, visual changes. Cardiovascular:  Chest pain, irregular heartbeats, palpitations, paroxysmal nocturnal dyspnea. Respiratory:  Shortness of breath, coughing, sputum production, hemoptysis, wheezing, orthopnea.   Gastrointestinal:  Nausea, vomiting, diarrhea, heartburn, constipation, abdominal pain, hematemesis, hematochezia, melena, acholic stools  Genito-Urinary:  Dysuria, urgency, frequency, hematuria  Musculoskeletal:  Joint pain, joint stiffness, joint swelling, muscle pain  Neurology:  Headache, focal neurological deficits, weakness, numbness, paresthesia  Derm:  Rashes, ulcers, excoriations, bruising  Extremities:  Decreased ROM, peripheral edema, mottling    Physical Examination  Vitals:  BP (!) 116/54   Pulse 69   Temp 98.5 °F (36.9 °C) (Oral)   Resp 20   Ht 6' (1.829 m)   Wt (!) 340 lb 9 oz (154.5 kg)   SpO2 94%   BMI 46.19 kg/m²   General Appearance:  awake, alert, and oriented to person, place, time, and purpose; appears stated age and cooperative; no apparent distress no labored breathing obese  HEENT:  NCAT; PERRL; conjunctiva pink, sclera clear  Neck:  no adenopathy, bruit, JVD, tenderness, masses, or nodules; supple, symmetrical, trachea midline, thyroid not enlarged  Lung:  clear to auscultation bilaterally; no use of accessory muscles; no rhonchi, rales, or crackles  Heart:  regular rate and regular rhythm without murmur, rub, or gallop  Abdomen:  soft, nontender, nondistended; normoactive bowel sounds; no organomegaly  Extremities:  extremities normal, atraumatic, no cyanosis or edema  Musculokeletal:  no joint swelling, no muscle tenderness. ROM normal in all joints of extremities.    Neurologic:  mental status A&Ox3, thought content appropriate; CN II-XII grossly intact; sensation intact, motor strength 5/5 globally; no slurred speech  Osteopathic:  Patient examined in seated position; normal lumbar lordosis and thoracic kyphosis; no TART changes to paraspinal musculature    Laboratory Data  Recent Results (from the past 24 hour(s))   CBC auto differential    Collection Time: 05/25/19 10:02 PM   Result Value Ref Range    WBC 7.2 4.5 - 11.5 E9/L    RBC 4.20 3.50 - 5.50 E12/L    Hemoglobin 12.3 11.5 - 15.5 g/dL    Hematocrit 36.0 34.0 - 48.0 %    MCV 85.7 80.0 - 99.9 fL    MCH 29.3 26.0 - 35.0 pg    MCHC 34.2 32.0 - 34.5 %    RDW 14.4 11.5 - 15.0 fL    Platelets 718 532 - 871 E9/L    MPV 10.3 7.0 - 12.0 fL    Neutrophils % 88.7 (H) 43.0 - 80.0 %    Lymphocytes % 5.2 (L) 20.0 - 42.0 %    Monocytes % 5.2 2.0 - 12.0 %    Eosinophils % 0.0 0.0 - 6.0 %    Basophils % 0.9 0.0 - 2.0 %    Neutrophils # 6.41 1.80 - 7.30 E9/L    Lymphocytes # 0.36 (L) 1.50 - 4.00 E9/L    Monocytes # 0.36 0.10 - 0.95 E9/L    Eosinophils # 0.00 (L) 0.05 - 0.50 E9/L    Basophils # 0.06 0.00 - 0.20 E9/L    Anisocytosis 1+     Polychromasia 1+     Poikilocytes 1+     Ovalocytes 1+    Basic Metabolic Panel    Collection Time: 05/25/19 10:02 PM   Result Value Ref Range    Sodium 134 132 - 146 mmol/L    Potassium 3.6 3.5 - 5.0 mmol/L    Chloride 99 98 - 107 mmol/L    CO2 21 (L) 22 - 29 mmol/L    Anion Gap 14 7 - 16 mmol/L    Glucose 167 (H) 74 - 99 mg/dL    BUN 13 8 - 23 mg/dL    CREATININE 0.7 0.5 - 1.0 mg/dL    GFR Non-African American >60 >=60 mL/min/1.73    GFR African American >60     Calcium 7.9 (L) 8.6 - 10.2 mg/dL   Lactic Acid, Plasma    Collection Time: 05/25/19 10:02 PM   Result Value Ref Range    Lactic Acid 1.0 0.5 - 2.2 mmol/L   Troponin    Collection Time: 05/25/19 10:02 PM   Result Value Ref Range    Troponin <0.01 0.00 - 0.03 ng/mL   EKG 12 Lead    Collection Time: 05/25/19 10:12 PM   Result Value Ref Range    Ventricular Rate 96 BPM    Atrial Rate 96 BPM    P-R Interval 144 ms    QRS Duration 98 ms    Q-T Interval 368 ms    QTc Calculation (Bazett) 464 ms    P Axis 50 degrees    R Axis 12 degrees    T Axis 33 degrees   Urinalysis with Microscopic    Collection Time: 05/26/19 12:00 AM   Result Value Ref Range    Color, UA Yellow Straw/Yellow    Clarity, UA Clear Clear    Glucose, Ur Negative Negative mg/dL    Bilirubin Urine Negative Negative    Ketones, Urine Negative Negative mg/dL    Specific Gravity, UA <=1.005 1.005 - 1.030    Blood, Urine MODERATE (A) Negative    pH, UA 6.5 5.0 - 9.0    Protein, UA TRACE Negative mg/dL    Urobilinogen, Urine 1.0 <2.0 E.U./dL    Nitrite, Urine Negative Negative    Leukocyte Esterase, Urine Negative Negative    WBC, UA 2-5 0 - 5 /HPF    RBC, UA 2-5 0 - 2 /HPF    Epi Cells RARE /HPF    Bacteria, UA RARE (A) /HPF   POCT Glucose    Collection Time: 05/26/19  9:46 AM   Result Value Ref Range    Meter Glucose 132 (H) 74 - 99 mg/dL   POCT Glucose    Collection Time: 05/26/19  9:47 AM   Result Value Ref Range    Glucose 132 mg/dL    QC OK?  yes    EKG 12 Lead    Collection Time: 05/26/19  9:51 AM   Result Value Ref Range    Ventricular Rate 83 BPM    Atrial Rate 83 BPM    P-R Interval 154 ms    QRS Duration 102 ms    Q-T Interval 414 ms    QTc Calculation (Bazett) 486 ms    P Axis 49 degrees Urine Negative Negative mg/dL    Specific Gravity, UA 1.010 1.005 - 1.030    Blood, Urine MODERATE (A) Negative    pH, UA 6.0 5.0 - 9.0    Protein, UA 30 (A) Negative mg/dL    Urobilinogen, Urine 1.0 <2.0 E.U./dL    Nitrite, Urine Negative Negative    Leukocyte Esterase, Urine TRACE (A) Negative   Microscopic Urinalysis    Collection Time: 05/26/19 11:00 AM   Result Value Ref Range    WBC, UA 5-10 0 - 5 /HPF    RBC, UA 2-5 0 - 2 /HPF    Epi Cells FEW /HPF    Bacteria, UA FEW (A) /HPF       Imaging  Xr Chest Standard (2 Vw)    Result Date: 5/26/2019  Location:100 Exam: XR CHEST (2 VW) Indications: Lethargy, following asleep during exam. Fever, nausea, vomiting, diarrhea Findings: 2 views of the chest were obtained and compared to the previous exam of 5/25/2019 . Slightly limited level of inspiration is present, with resultant crowding of the pulmonary vascular markings. There is mild linear density within the left mid lung, not well seen on the lateral view. Findings are similar to previous study and most likely represent chronic scarring. No acute pulmonary infiltrate, pleural effusion, or pneumothorax is seen bilaterally. Cardiac silhouette remains enlarged. No findings to suggest pulmonary edema are noted. Osseous structures are not significantly changed, as visualized. Degenerative changes of the thoracic spine are seen. 1. Slightly limited level of inspiration. 2. Mild left midlung linear scar. 3. No acute cardiopulmonary disease or significant change since previous study of 5/25/2019     Xr Chest Standard (2 Vw)    Result Date: 5/25/2019  LOCATION: 200 EXAM: XR CHEST (2 VW) COMPARISON: 2/15/2019 HISTORY: Shortness of breath TECHNIQUE: PA and lateral  views of the chest were obtained. FINDINGS:  SUPPORT DEVICES: None. LUNGS: Radiograph is rotated with no areas of consolidation. Bandlike scarring again seen in the left midlung zone. PLEURA: No effusions or pneumothorax.  LUNG VOLUMES: Satisfactory inspirator effort. MEDIASTINAL STRUCTURES: No lymphadenopathy. Normal aortic contour. HEART SIZE: Normal. UPPER ABDOMEN: Unremarkable. BONES AND SOFT TISSUES: No fracture or soft tissue abnormality. 1. No active cardiopulmonary disease. Ct Head W Wo Contrast    Result Date: 5/25/2019  LOCATION: 200 EXAM: CT HEAD W WO CONTRAST COMPARISON: None HISTORY: Fever with headaches TECHNIQUE: Contrast-enhanced CT images were performed of the head. Coronal and sagittal reconstructions also obtained. Automated dose control was used for this exam. CONTRAST: 80 mL Isovue-370  intravenous contrast was administered. FINDINGS: HEAD: No enhancing lesions identified. The brain parenchyma is normal in density. There is no evidence of intracranial hemorrhage or mass. No extra-axial fluid is seen. The paranasal sinuses are clear. The globes and orbits are normal.  No abnormalities of the calvarium are identified. No hemorrhage or mass. Ct Abdomen Pelvis W Iv Contrast Additional Contrast? None    Result Date: 5/25/2019  Reading location:  200 HISTORY: Left flank pain Serial axial images of the abdomen and pelvis were obtained following the uneventful ministration of 80 cc of Isovue-370 intravenous contrast. Reformatted sagittal coronal images were obtained. Oral contrast was not administered. The lung bases are clear. The liver is unremarkable. The spleen is enlarged and measures 18 cm x 16 cm x 10 cm. The pancreas is fatty-replaced. The adrenal glands are unremarkable. The right kidney is unremarkable. The left kidney is mildly edematous. Perinephric induration is noted. There is no evidence of hydronephrosis. There is no calculus seen within the course of the left ureter. There is no calculus within urinary bladder. The gallbladder is unremarkable. There is no large or small bowel obstruction. There are a few scattered sigmoid diverticula. There is no evidence of diverticulitis. The abdominal aorta is normal in caliber.  There is a left upper quadrant abdominal wall defect consistent with a hernia. Mesenteric fat is herniated only. The border of the hernia is poorly defined but measures approximately 7 cm x 6 cm. 1. Mild left perinephric stranding and mild edematous changes of the left kidney consistent with mild left pyelonephritis. 2. Splenomegaly. 3. Left upper quadrant ventral hernia containing mesenteric fat only. Assessment and Plan  Patient is a 70 y.o. female who presented with fever, nausea, emesis. The active problem list is as follows:    · Bacteremia with GNR from currently unknown source  · Questionable left pyelonephritis with unrevealing UA  · History of bowel resection due to diverticulitis  · Ventral hernia with mesenteric fat  · Hypokalemia  · Rheumatoid arthritis  · Hypothyroidism  · Hypertension    -Continue rocephin  -Await blood culture finalization from 9/25/19  -Consult infectious disease  -Sed rate, CRP, procalcitonin  -Holding methotrexate and Enbrel for now  -Hold lovenox secondary to recent LP that was unsuccessful    · Routine labs in the morning. · DVT prophylaxis. SCDs  · Please see orders for further management and care. The plan to be discussed with Dr. Sully Rodas.     2 [facility] BANERJEE PGYII  12:59 PM  5/26/2019

## 2019-05-26 NOTE — ED NOTES
4th floor called stated they do not have a monitor in that room will call me back     Jocelyn Bhandari RN  05/26/19 [id number]

## 2019-05-27 LAB
ALBUMIN SERPL-MCNC: 3.1 G/DL (ref 3.5–5.2)
ALP BLD-CCNC: 113 U/L (ref 35–104)
ALT SERPL-CCNC: 36 U/L (ref 0–32)
ANION GAP SERPL CALCULATED.3IONS-SCNC: 11 MMOL/L (ref 7–16)
AST SERPL-CCNC: 28 U/L (ref 0–31)
BILIRUB SERPL-MCNC: 0.6 MG/DL (ref 0–1.2)
BUN BLDV-MCNC: 16 MG/DL (ref 8–23)
C-REACTIVE PROTEIN: 13.9 MG/DL (ref 0–0.4)
CALCIUM SERPL-MCNC: 8.3 MG/DL (ref 8.6–10.2)
CHLORIDE BLD-SCNC: 99 MMOL/L (ref 98–107)
CHOLESTEROL, TOTAL: 130 MG/DL (ref 0–199)
CO2: 25 MMOL/L (ref 22–29)
CREAT SERPL-MCNC: 0.8 MG/DL (ref 0.5–1)
GFR AFRICAN AMERICAN: >60
GFR NON-AFRICAN AMERICAN: >60 ML/MIN/1.73
GLUCOSE BLD-MCNC: 106 MG/DL (ref 74–99)
HBA1C MFR BLD: 5 % (ref 4–5.6)
HCT VFR BLD CALC: 32.7 % (ref 34–48)
HDLC SERPL-MCNC: 22 MG/DL
HEMOGLOBIN: 10.9 G/DL (ref 11.5–15.5)
LDL CHOLESTEROL CALCULATED: 72 MG/DL (ref 0–99)
MAGNESIUM: 2 MG/DL (ref 1.6–2.6)
MCH RBC QN AUTO: 29.1 PG (ref 26–35)
MCHC RBC AUTO-ENTMCNC: 33.3 % (ref 32–34.5)
MCV RBC AUTO: 87.4 FL (ref 80–99.9)
PDW BLD-RTO: 15 FL (ref 11.5–15)
PHOSPHORUS: 2.4 MG/DL (ref 2.5–4.5)
PLATELET # BLD: 162 E9/L (ref 130–450)
PMV BLD AUTO: 10.4 FL (ref 7–12)
POTASSIUM SERPL-SCNC: 3.3 MMOL/L (ref 3.5–5)
PROCALCITONIN: 0.84 NG/ML (ref 0–0.08)
RBC # BLD: 3.74 E12/L (ref 3.5–5.5)
SEDIMENTATION RATE, ERYTHROCYTE: 62 MM/HR (ref 0–20)
SODIUM BLD-SCNC: 135 MMOL/L (ref 132–146)
TOTAL PROTEIN: 6.4 G/DL (ref 6.4–8.3)
TRIGL SERPL-MCNC: 179 MG/DL (ref 0–149)
TSH SERPL DL<=0.05 MIU/L-ACNC: 3.55 UIU/ML (ref 0.27–4.2)
VLDLC SERPL CALC-MCNC: 36 MG/DL
WBC # BLD: 6.7 E9/L (ref 4.5–11.5)

## 2019-05-27 PROCEDURE — 2580000003 HC RX 258: Performed by: EMERGENCY MEDICINE

## 2019-05-27 PROCEDURE — 36415 COLL VENOUS BLD VENIPUNCTURE: CPT

## 2019-05-27 PROCEDURE — 2580000003 HC RX 258: Performed by: INTERNAL MEDICINE

## 2019-05-27 PROCEDURE — 84443 ASSAY THYROID STIM HORMONE: CPT

## 2019-05-27 PROCEDURE — 85027 COMPLETE CBC AUTOMATED: CPT

## 2019-05-27 PROCEDURE — 6360000002 HC RX W HCPCS: Performed by: INTERNAL MEDICINE

## 2019-05-27 PROCEDURE — 6370000000 HC RX 637 (ALT 250 FOR IP): Performed by: INTERNAL MEDICINE

## 2019-05-27 PROCEDURE — 80061 LIPID PANEL: CPT

## 2019-05-27 PROCEDURE — 87088 URINE BACTERIA CULTURE: CPT

## 2019-05-27 PROCEDURE — 80053 COMPREHEN METABOLIC PANEL: CPT

## 2019-05-27 PROCEDURE — 86140 C-REACTIVE PROTEIN: CPT

## 2019-05-27 PROCEDURE — 85651 RBC SED RATE NONAUTOMATED: CPT

## 2019-05-27 PROCEDURE — 83036 HEMOGLOBIN GLYCOSYLATED A1C: CPT

## 2019-05-27 PROCEDURE — 83735 ASSAY OF MAGNESIUM: CPT

## 2019-05-27 PROCEDURE — 84100 ASSAY OF PHOSPHORUS: CPT

## 2019-05-27 PROCEDURE — 84145 PROCALCITONIN (PCT): CPT

## 2019-05-27 PROCEDURE — 1200000000 HC SEMI PRIVATE

## 2019-05-27 RX ORDER — POTASSIUM CHLORIDE 20 MEQ/1
40 TABLET, EXTENDED RELEASE ORAL ONCE
Status: COMPLETED | OUTPATIENT
Start: 2019-05-27 | End: 2019-05-27

## 2019-05-27 RX ORDER — POTASSIUM CHLORIDE AND SODIUM CHLORIDE 900; 300 MG/100ML; MG/100ML
INJECTION, SOLUTION INTRAVENOUS CONTINUOUS
Status: DISCONTINUED | OUTPATIENT
Start: 2019-05-27 | End: 2019-05-28 | Stop reason: HOSPADM

## 2019-05-27 RX ADMIN — WATER 2 G: 1 INJECTION INTRAMUSCULAR; INTRAVENOUS; SUBCUTANEOUS at 12:57

## 2019-05-27 RX ADMIN — POTASSIUM CHLORIDE 40 MEQ: 20 TABLET, EXTENDED RELEASE ORAL at 12:57

## 2019-05-27 RX ADMIN — SERTRALINE HYDROCHLORIDE 100 MG: 50 TABLET ORAL at 09:39

## 2019-05-27 RX ADMIN — Medication 10 ML: at 20:44

## 2019-05-27 RX ADMIN — PANTOPRAZOLE SODIUM 40 MG: 40 TABLET, DELAYED RELEASE ORAL at 06:01

## 2019-05-27 RX ADMIN — LEVOTHYROXINE SODIUM 125 MCG: 125 TABLET ORAL at 06:01

## 2019-05-27 RX ADMIN — FOLIC ACID 1 MG: 1 TABLET ORAL at 09:39

## 2019-05-27 RX ADMIN — Medication 10 ML: at 20:39

## 2019-05-27 RX ADMIN — POTASSIUM CHLORIDE AND SODIUM CHLORIDE: 900; 300 INJECTION, SOLUTION INTRAVENOUS at 12:57

## 2019-05-27 ASSESSMENT — PAIN SCALES - GENERAL
PAINLEVEL_OUTOF10: 0

## 2019-05-27 NOTE — CONSULTS
[address] [place] [address] [facility]  Consult Note    [address] [facility]  [place], [address]  Phone [phone]   [phone]      Admit Date: 2019  9:10 AM  Pt Name: Kristal Lacy  MRN: [id number]  : 1955  Reason for Consult:    Chief Complaint   Patient presents with    Nausea    Emesis    Fever    Headache     Requesting Physician:  Denisse Toure DO  PCP: Alma Rosa Escobedo DO  History Obtained From:  patient  ID consulted for bacteremia on hospital day  57       Chief Complaint   Patient presents with    Nausea    Emesis    Fever    Headache     HISTORYOF PRESENT ILLNESS      Kristal Lacy is a 70 y.o. female who presents with significant past medical history of  has a past medical history of Diverticulitis, GERD (gastroesophageal reflux disease), Rheumatoid arthritis involving multiple sites with positive rheumatoid factor ([facility] [address] and Thyroid disease. who presents with   Chief Complaint   Patient presents with    Nausea    Emesis    Fever    Headache     ED TRIAGEVITALS  BP: (!) 141/89, Temp: 98.6 °F (37 °C), Pulse: 85, Resp: 18, SpO2: 95 %  HPI  Pt started to feel sick about a week ago after giving herself enbrel. She did visit a family member in the hospital last week. She started to not feel well n/chills/ weakness/ She denies any urinary complaints.   Lives with daughter/grand kids 15/17 nobody else sick  Has 2 dogs/cat  No recent dental work or procedure  S/p attempt LP  Still feels weak no f/c  REVIEW OF SYSTEMS    (2-9 systems for level 4, 10 or more for level 5)     REVIEW OFSYSTEMS:    CONSTITUTIONAL:   No fever, + chills,    ALLERGIES:    No urticaria, hay fever,    EYES:     No blurry vision, loss of vision,eye pain  ENT:      No hearing loss, sore throat  CARDIOVASCULAR:  No chest pain or palpitations  RESPIRATORY:   No cough, sob  ENDOCRINE:    No increase thirst, urination   HEME-LYMPH:   No easy bruising or bleeding  GI: + nausea, vomiting - diarrhea  :     No urinary complaints  NEURO:    No seizures, stroke, HA  MUSCULOSKELETAL:  No muscle aches or pain, no jointpain  SKIN:     No rash or itch  PSYCH:    No depression or anxiety    CURRENT MEDICATIONS     Current Facility-Administered Medications:     sodium chloride flush 0.9 % injection 10 mL, 10 mL, Intravenous, 2 times per day, Shreya Albrecht DO, 10 mL at 05/26/19 1320    sodium chloride flush 0.9 % injection 10 mL, 10 mL, Intravenous, PRN, Terry Fallon DO    cefTRIAXone (ROCEPHIN) 2 g in sterile water 20 mL IV syringe, 2 g, Intravenous, Q24H, Parul Pressley DO    folic acid (FOLVITE) tablet 1 mg, 1 mg, Oral, Daily, Raisa Pressley DO, 1 mg at 05/26/19 1440    levothyroxine (SYNTHROID) tablet 125 mcg, 125 mcg, Oral, Daily, Raisa Pressley DO, 125 mcg at 05/27/19 0601    pantoprazole (PROTONIX) tablet 40 mg, 40 mg, Oral, QAM AC, Raisa Pressley DO, 40 mg at 05/27/19 0601    sertraline (ZOLOFT) tablet 100 mg, 100 mg, Oral, Daily, Raisa Pressley DO, 100 mg at 05/26/19 1440    polyvinyl alcohol (LIQUIFILM TEARS) 1.4 % ophthalmic solution 1 drop, 1 drop, Left Eye, PRN, Raisa Pressley DO    sodium chloride flush 0.9 % injection 10 mL, 10 mL, Intravenous, 2 times per day, Parul Pressley DO    sodium chloride flush 0.9 % injection 10 mL, 10 mL, Intravenous, PRN, Chepe Dumont DO    trimethobenzamide Catherne Daily) injection 200 mg, 200 mg, Intramuscular, Q6H PRN, Chepe Dumont DO    acetaminophen (TYLENOL) tablet 650 mg, 650 mg, Oral, Q4H PRN, Parul Pressley DO    0.9 % sodium chloride infusion, , Intravenous, Continuous, Raisa Pressley DO, Last Rate: 80 mL/hr at 05/26/19 1431    ketorolac (TORADOL) injection 15 mg, 15 mg, Intravenous, Q6H PRN, [place] Jaden Pressley DO, 15 mg at 05/26/19 6566  ALLERGIES     Penicillins  Immunization History   Administered Date(s) Administered    Pneumococcal 13-valent Conjugate (Stephani Favre) 2019     PAST MEDICAL HISTORY     Past Medical History:   Diagnosis Date    Diverticulitis     GERD (gastroesophageal reflux disease)     Rheumatoid arthritis involving multiple sites with positive rheumatoid factor ([facility] [address] 2017    Thyroid disease      SURGICAL HISTORY       Past Surgical History:   Procedure Laterality Date    APPENDECTOMY      CHOLECYSTECTOMY, LAPAROSCOPIC  2017    robotic assisted cholecystectomy with IOC    COLONOSCOPY  2017    OTHER SURGICAL HISTORY  2017    Laparoscopic Hand Assisted Open Sigmoid Colectomy with Colostomy     OTHER SURGICAL HISTORY  2017    laparscopic robotic assited colostomy reversed    TONSILLECTOMY       FAMILY HISTORY       Family History   Problem Relation Age of Onset    Diabetes Mother     Breast Cancer Mother     Other Father     Heart Disease Father      SOCIAL HISTORY       Social History     Socioeconomic History    Marital status: Single     Spouse name: None    Number of children: None    Years of education: None    Highest education level: None   Occupational History    None   Social Needs    Financial resource strain: None    Food insecurity:     Worry: None     Inability: None    Transportation needs:     Medical: None     Non-medical: None   Tobacco Use    Smoking status: Former Smoker     Packs/day: 0.00     Years: 0.00     Pack years: 0.00     Last attempt to quit: 1986     Years since quittin.4    Smokeless tobacco: Never Used    Tobacco comment: quit 20 yrs ago   Substance and Sexual Activity    Alcohol use: No    Drug use: No    Sexual activity: None   Lifestyle    Physical activity:     Days per week: None     Minutes per session: None    Stress: None   Relationships    Social connections:     Talks on phone: None     Gets together: None     Attends Jainism service: None     Active member of club or organization: None     Attends meetings of clubs or organizations: None Relationship status: None    Intimate partner violence:     Fear of current or ex partner: None     Emotionally abused: None     Physically abused: None     Forced sexual activity: None   Other Topics Concern    None   Social History Narrative    None     ·   Used to do office    PHYSICAL EXAM    (up to 7 for level 4, 8 or more forlevel 5)     ED Triage Vitals [05/26/19 0920]   BP Temp Temp Source Pulse Resp SpO2 Height Weight   (!) 116/92 99.2 °F (37.3 °C) Oral 87 20 93 % 6' (1.829 m) (!) 340 lb 9 oz (154.5 kg)     Vitals:    Vitals:    05/26/19 1600 05/26/19 2328 05/27/19 0701 05/27/19 0800   BP: 125/83   (!) 141/89   Pulse: 68   85   Resp: 16   18   Temp: 98.1 °F (36.7 °C)   98.6 °F (37 °C)   TempSrc:    Oral   SpO2: 96% 98%  95%   Weight:   (!) 347 lb (157.4 kg)    Height:         Physical Exam   Constitutional/General: Alert and oriented, NAD obese  Head: NC/AT  Eyes: PERRL, EOMI  Mouth: Normal mucosa, no thrush   Neck: Supple, full ROM,    Pulmonary: Lungs clear to auscultation bilaterally. Not in respiratory distress  Cardiovascular:  Regular rate and rhythm, no murmurs, gallops, or rubs. Abdomen: Soft, + BS. No distension. Nontender. Extremities: Moves all extremities x 4. Warm and well perfused  Pulses:  Distal pulses intact  Skin: Warm and dry without rash  Neurologic:    No focal deficits  Psych: Normal Affect     DIAGNOSTICRESULTS   RADIOLOGY:   Xr Chest Standard (2 Vw)    Result Date: 5/26/2019  Location:100 Exam: XR CHEST (2 VW) Indications: Lethargy, following asleep during exam. Fever, nausea, vomiting, diarrhea Findings: 2 views of the chest were obtained and compared to the previous exam of 5/25/2019 . Slightly limited level of inspiration is present, with resultant crowding of the pulmonary vascular markings. There is mild linear density within the left mid lung, not well seen on the lateral view. Findings are similar to previous study and most likely represent chronic scarring.  No acute pulmonary infiltrate, pleural effusion, or pneumothorax is seen bilaterally. Cardiac silhouette remains enlarged. No findings to suggest pulmonary edema are noted. Osseous structures are not significantly changed, as visualized. Degenerative changes of the thoracic spine are seen. 1. Slightly limited level of inspiration. 2. Mild left midlung linear scar. 3. No acute cardiopulmonary disease or significant change since previous study of 5/25/2019     Xr Chest Standard (2 Vw)    Result Date: 5/25/2019  LOCATION: 200 EXAM: XR CHEST (2 VW) COMPARISON: 2/15/2019 HISTORY: Shortness of breath TECHNIQUE: PA and lateral  views of the chest were obtained. FINDINGS:  SUPPORT DEVICES: None. LUNGS: Radiograph is rotated with no areas of consolidation. Bandlike scarring again seen in the left midlung zone. PLEURA: No effusions or pneumothorax. LUNG VOLUMES: Satisfactory inspirator effort. MEDIASTINAL STRUCTURES: No lymphadenopathy. Normal aortic contour. HEART SIZE: Normal. UPPER ABDOMEN: Unremarkable. BONES AND SOFT TISSUES: No fracture or soft tissue abnormality. 1. No active cardiopulmonary disease. Ct Head W Wo Contrast    Result Date: 5/25/2019  LOCATION: 200 EXAM: CT HEAD W WO CONTRAST COMPARISON: None HISTORY: Fever with headaches TECHNIQUE: Contrast-enhanced CT images were performed of the head. Coronal and sagittal reconstructions also obtained. Automated dose control was used for this exam. CONTRAST: 80 mL Isovue-370  intravenous contrast was administered. FINDINGS: HEAD: No enhancing lesions identified. The brain parenchyma is normal in density. There is no evidence of intracranial hemorrhage or mass. No extra-axial fluid is seen. The paranasal sinuses are clear. The globes and orbits are normal.  No abnormalities of the calvarium are identified. No hemorrhage or mass.     Ct Abdomen Pelvis W Iv Contrast Additional Contrast? None    Result Date: 5/25/2019  Reading location:  200 HISTORY: Left flank pain Serial axial images of the abdomen and pelvis were obtained following the uneventful ministration of 80 cc of Isovue-370 intravenous contrast. Reformatted sagittal coronal images were obtained. Oral contrast was not administered. The lung bases are clear. The liver is unremarkable. The spleen is enlarged and measures 18 cm x 16 cm x 10 cm. The pancreas is fatty-replaced. The adrenal glands are unremarkable. The right kidney is unremarkable. The left kidney is mildly edematous. Perinephric induration is noted. There is no evidence of hydronephrosis. There is no calculus seen within the course of the left ureter. There is no calculus within urinary bladder. The gallbladder is unremarkable. There is no large or small bowel obstruction. There are a few scattered sigmoid diverticula. There is no evidence of diverticulitis. The abdominal aorta is normal in caliber. There is a left upper quadrant abdominal wall defect consistent with a hernia. Mesenteric fat is herniated only. The border of the hernia is poorly defined but measures approximately 7 cm x 6 cm. 1. Mild left perinephric stranding and mild edematous changes of the left kidney consistent with mild left pyelonephritis. 2. Splenomegaly. 3. Left upper quadrant ventral hernia containing mesenteric fat only. LABS:  No results for input(s): PROCAL in the last 72 hours.   Lab Results   Component Value Date    CRP 1.5 (H) 04/21/2017     Lab Results   Component Value Date    SEDRATE 62 (H) 05/27/2019    SEDRATE 80 (H) 07/19/2017    SEDRATE 73 (H) 04/21/2017     No results found for: [id number]  Lab Results   Component Value Date    HEPAIGM Non-Reactive 07/19/2017    HEPBIGM Non-Reactive 07/19/2017     Results for orders placed or performed during the hospital encounter of 05/26/19   Lactic Acid, Plasma   Result Value Ref Range    Lactic Acid 0.8 0.5 - 2.2 mmol/L   CBC Auto Differential   Result Value Ref Range    WBC 6.7 4.5 - 11.5 E9/L    RBC 3.82 3.50 - 5.50 E12/L    Hemoglobin 11.1 (L) 11.5 - 15.5 g/dL    Hematocrit 32.9 (L) 34.0 - 48.0 %    MCV 86.1 80.0 - 99.9 fL    MCH 29.1 26.0 - 35.0 pg    MCHC 33.7 32.0 - 34.5 %    RDW 14.8 11.5 - 15.0 fL    Platelets 619 634 - 383 E9/L    MPV 10.9 7.0 - 12.0 fL    Neutrophils % 90.4 (H) 43.0 - 80.0 %    Lymphocytes % 3.5 (L) 20.0 - 42.0 %    Monocytes % 5.2 2.0 - 12.0 %    Eosinophils % 0.9 0.0 - 6.0 %    Basophils % 0.1 0.0 - 2.0 %    Neutrophils # 6.03 1.80 - 7.30 E9/L    Lymphocytes # 0.27 (L) 1.50 - 4.00 E9/L    Monocytes # 0.34 0.10 - 0.95 E9/L    Eosinophils # 0.06 0.05 - 0.50 E9/L    Basophils # 0.00 0.00 - 0.20 E9/L    Polychromasia 1+     Poikilocytes 1+     Asim Cells 1+     Ovalocytes 1+    Basic Metabolic Panel w/ Reflex to MG   Result Value Ref Range    Sodium 133 132 - 146 mmol/L    Potassium reflex Magnesium 3.0 (L) 3.5 - 5.0 mmol/L    Chloride 97 (L) 98 - 107 mmol/L    CO2 24 22 - 29 mmol/L    Anion Gap 12 7 - 16 mmol/L    Glucose 141 (H) 74 - 99 mg/dL    BUN 14 8 - 23 mg/dL    CREATININE 0.7 0.5 - 1.0 mg/dL    GFR Non-African American >60 >=60 mL/min/1.73    GFR African American >60     Calcium 8.3 (L) 8.6 - 10.2 mg/dL   Troponin   Result Value Ref Range    Troponin <0.01 0.00 - 0.03 ng/mL   Urinalysis, reflex to microscopic   Result Value Ref Range    Color, UA Yellow Straw/Yellow    Clarity, UA Clear Clear    Glucose, Ur Negative Negative mg/dL    Bilirubin Urine Negative Negative    Ketones, Urine Negative Negative mg/dL    Specific Gravity, UA 1.010 1.005 - 1.030    Blood, Urine MODERATE (A) Negative    pH, UA 6.0 5.0 - 9.0    Protein, UA 30 (A) Negative mg/dL    Urobilinogen, Urine 1.0 <2.0 E.U./dL    Nitrite, Urine Negative Negative    Leukocyte Esterase, Urine TRACE (A) Negative   Magnesium   Result Value Ref Range    Magnesium 2.0 1.6 - 2.6 mg/dL   Microscopic Urinalysis   Result Value Ref Range    WBC, UA 5-10 0 - 5 /HPF    RBC, UA 2-5 0 - 2 /HPF    Epi Cells FEW /HPF    Bacteria, UA FEW (A) /HPF Sedimentation Rate   Result Value Ref Range    Sed Rate 62 (H) 0 - 20 mm/Hr   Lipid Panel   Result Value Ref Range    Cholesterol, Total 130 0 - 199 mg/dL    Triglycerides 179 (H) 0 - 149 mg/dL    HDL 22 >40 mg/dL    LDL Calculated 72 0 - 99 mg/dL    VLDL Cholesterol Calculated 36 mg/dL   Magnesium   Result Value Ref Range    Magnesium 2.0 1.6 - 2.6 mg/dL   Phosphorus   Result Value Ref Range    Phosphorus 2.4 (L) 2.5 - 4.5 mg/dL   TSH without Reflex   Result Value Ref Range    TSH 3.550 0.270 - 4.200 uIU/mL   Comprehensive Metabolic Panel   Result Value Ref Range    Sodium 135 132 - 146 mmol/L    Potassium 3.3 (L) 3.5 - 5.0 mmol/L    Chloride 99 98 - 107 mmol/L    CO2 25 22 - 29 mmol/L    Anion Gap 11 7 - 16 mmol/L    Glucose 106 (H) 74 - 99 mg/dL    BUN 16 8 - 23 mg/dL    CREATININE 0.8 0.5 - 1.0 mg/dL    GFR Non-African American >60 >=60 mL/min/1.73    GFR African American >60     Calcium 8.3 (L) 8.6 - 10.2 mg/dL    Total Protein 6.4 6.4 - 8.3 g/dL    Alb 3.1 (L) 3.5 - 5.2 g/dL    Total Bilirubin 0.6 0.0 - 1.2 mg/dL    Alkaline Phosphatase 113 (H) 35 - 104 U/L    ALT 36 (H) 0 - 32 U/L    AST 28 0 - 31 U/L   CBC   Result Value Ref Range    WBC 6.7 4.5 - 11.5 E9/L    RBC 3.74 3.50 - 5.50 E12/L    Hemoglobin 10.9 (L) 11.5 - 15.5 g/dL    Hematocrit 32.7 (L) 34.0 - 48.0 %    MCV 87.4 80.0 - 99.9 fL    MCH 29.1 26.0 - 35.0 pg    MCHC 33.3 32.0 - 34.5 %    RDW 15.0 11.5 - 15.0 fL    Platelets 556 857 - 743 E9/L    MPV 10.4 7.0 - 12.0 fL   POCT Glucose   Result Value Ref Range    Glucose 132 mg/dL    QC OK?  yes    POCT Glucose   Result Value Ref Range    Meter Glucose 132 (H) 74 - 99 mg/dL   EKG 12 Lead   Result Value Ref Range    Ventricular Rate 83 BPM    Atrial Rate 83 BPM    P-R Interval 154 ms    QRS Duration 102 ms    Q-T Interval 414 ms    QTc Calculation (Bazett) 486 ms    P Axis 49 degrees    R Axis 15 degrees    T Axis 45 degrees     MICROBIOLOGY:  Cultures :   Lab Results   Component Value Date    [facility] prognosis, indications, risks and benefits of treatment. The patient/FAMILY is in agreement with the proposed medical treatment plan. An opportunity to ask questions was given to the patient/FAMILY and questions were answered. Thank you for the consult. We will follow with you.        Electronically signed by Emelina Andino MD on 5/27/2019 at 8:15 AM

## 2019-05-27 NOTE — PLAN OF CARE
Problem: Falls - Risk of:  Goal: Will remain free from falls  Description  Will remain free from falls  5/27/2019 1007 by Edmond Painter RN  Outcome: Met This Shift     Problem: Falls - Risk of:  Goal: Absence of physical injury  Description  Absence of physical injury  5/27/2019 1007 by Edmond Painter RN  Outcome: Met This Shift     Problem: Pain:  Goal: Pain level will decrease  Description  Pain level will decrease  5/27/2019 1007 by Edmond Painter RN  Outcome: Met This Shift     Problem: Pain:  Goal: Control of acute pain  Description  Control of acute pain  5/27/2019 1007 by Edmond Painter RN  Outcome: Met This Shift     Problem: Pain:  Goal: Control of chronic pain  Description  Control of chronic pain  5/27/2019 1007 by Edmond Painter RN  Outcome: Met This Shift

## 2019-05-27 NOTE — PROGRESS NOTES
HPI:  Julia Iyer presented to [address] [place] [address] emergency department yesterday for the 3rd consecutive day. She had been complaining of intractable abdominal discomfort with subjective fever and chills. Blood cultures had been obtained on previous emergency room visits and had returned positive times one bottle for gram-negative rods. She denies any overt abdominal pain or discomfort today. She denies dysuria. She denies diarrhea. She has no upper respiratory tract infection symptomatology. She is immunocompromised in the setting of rheumatoid arthritis and immunomodulator therapy. Patient voiced no new complaints since hospital admission. Questions, answers, and tests reviewed. ROS:  Cardiovascular:   Denies any chest pain, irregular heartbeats, or palpitations. Respiratory:   Denies shortness of breath, coughing, sputum production, hemoptysis, or wheezing. Gastrointestinal:   Denies nausea, vomiting, diarrhea, or constipation. Denies any abdominal pain. Extremities:   Denies any lower extremity swelling or edema. Neurology:    Denies any headache or focal neurological deficits. No weakness or paresthesia. Derm:    Denies any rashes, ulcers, or excoriations. Denies bruising. Genitourinary:    Denies any urgency, frequency, hematuria. Voiding without difficulty. Physical Exam:    Vitals:    05/27/19 0800   BP: (!) 141/89   Pulse: 85   Resp: 18   Temp: 98.6 °F (37 °C)   SpO2: 95%       HEENT:  PERRLA. EOMI. Sclera clear. Buccal mucosa moist.    Neck:  Supple. Trachea midline. No thyromegaly. No JVD. No bruits. Heart:  Rhythm regular, rate controlled. No murmurs. Lungs:  Symmetrical. Clear to auscultation bilaterally. No wheezes. No rhonchi. No rales. Abdomen: Soft. Non-tender. Non-distended. Bowel sounds positive. No organomegaly or masses. No pain on palpation    Extremities:  Peripheral pulses present. No peripheral edema. No ulcers. Neurologic:  Alert x 3. No focal deficit.   Cranial nerves grossly intact. Skin:  No petechia. No hemorrhage. No wounds. CBC with Differential:    Lab Results   Component Value Date    WBC 6.7 05/27/2019    RBC 3.74 05/27/2019    HGB 10.9 05/27/2019    HCT 32.7 05/27/2019     05/27/2019    MCV 87.4 05/27/2019    MCH 29.1 05/27/2019    MCHC 33.3 05/27/2019    RDW 15.0 05/27/2019    METASPCT 1.0 09/17/2017    LYMPHOPCT 3.5 05/26/2019    MONOPCT 5.2 05/26/2019    MYELOPCT 3.0 09/17/2017    BASOPCT 0.1 05/26/2019    MONOSABS 0.34 05/26/2019    LYMPHSABS 0.27 05/26/2019    EOSABS 0.06 05/26/2019    BASOSABS 0.00 05/26/2019     BMP:    Lab Results   Component Value Date     05/27/2019    K 3.3 05/27/2019    K 3.0 05/26/2019    CL 99 05/27/2019    CO2 25 05/27/2019    BUN 16 05/27/2019    LABALBU 3.1 05/27/2019    CREATININE 0.8 05/27/2019    CALCIUM 8.3 05/27/2019    GFRAA >60 05/27/2019    LABGLOM >60 05/27/2019    GLUCOSE 106 05/27/2019       Other Data:      Intake/Output Summary (Last 24 hours) at 5/27/2019 1021  Last data filed at 5/27/2019 0629  Gross per 24 hour   Intake 1757.33 ml   Output --   Net 1757.33 ml         Scheduled Medications:   sodium chloride flush  10 mL Intravenous 2 times per day    cefTRIAXone (ROCEPHIN) IV  2 g Intravenous W31W    folic acid  1 mg Oral Daily    levothyroxine  125 mcg Oral Daily    pantoprazole  40 mg Oral QAM AC    sertraline  100 mg Oral Daily    sodium chloride flush  10 mL Intravenous 2 times per day         Infusion Medications:   sodium chloride 80 mL/hr at 05/26/19 1431       Assessment:   1. Gram-negative marquise bacteremia of unclear etiology with recent imaging studies indicative of left sided pyelonephritis with unrevealing urinalysis and urine culture pending  2. History of bowel resection due to diverticulitis  3. Ventral hernia with mesenteric fat  4. Hypokalemia  5. Rheumatoid arthritis  6. Hypothyroidism  7. Hypertension    Plan:   IV fluid resuscitation is being undertaken.  Antibiotic therapy is being employed and the infectious disease team is providing consultation. We are awaiting final culture results. Continue current therapy. See orders for further plan of care.     Adelaida Aparicio D.O.  10:21 AM  5/27/2019

## 2019-05-28 ENCOUNTER — APPOINTMENT (OUTPATIENT)
Dept: ULTRASOUND IMAGING | Age: 64
DRG: 872 | End: 2019-05-28
Payer: MEDICARE

## 2019-05-28 VITALS
TEMPERATURE: 98.5 F | DIASTOLIC BLOOD PRESSURE: 71 MMHG | HEART RATE: 76 BPM | WEIGHT: 293 LBS | BODY MASS INDEX: 39.68 KG/M2 | OXYGEN SATURATION: 96 % | HEIGHT: 72 IN | RESPIRATION RATE: 16 BRPM | SYSTOLIC BLOOD PRESSURE: 122 MMHG

## 2019-05-28 LAB
ALBUMIN SERPL-MCNC: 2.7 G/DL (ref 3.5–5.2)
ALP BLD-CCNC: 117 U/L (ref 35–104)
ALT SERPL-CCNC: 35 U/L (ref 0–32)
ANION GAP SERPL CALCULATED.3IONS-SCNC: 9 MMOL/L (ref 7–16)
AST SERPL-CCNC: 24 U/L (ref 0–31)
BILIRUB SERPL-MCNC: 0.4 MG/DL (ref 0–1.2)
BUN BLDV-MCNC: 12 MG/DL (ref 8–23)
CALCIUM SERPL-MCNC: 7.9 MG/DL (ref 8.6–10.2)
CHLORIDE BLD-SCNC: 105 MMOL/L (ref 98–107)
CO2: 25 MMOL/L (ref 22–29)
CREAT SERPL-MCNC: 0.6 MG/DL (ref 0.5–1)
GFR AFRICAN AMERICAN: >60
GFR NON-AFRICAN AMERICAN: >60 ML/MIN/1.73
GLUCOSE BLD-MCNC: 105 MG/DL (ref 74–99)
HBA1C MFR BLD: 5 % (ref 4–5.6)
HCT VFR BLD CALC: 30.7 % (ref 34–48)
HEMOGLOBIN: 10.4 G/DL (ref 11.5–15.5)
MCH RBC QN AUTO: 29.1 PG (ref 26–35)
MCHC RBC AUTO-ENTMCNC: 33.9 % (ref 32–34.5)
MCV RBC AUTO: 85.8 FL (ref 80–99.9)
PDW BLD-RTO: 15 FL (ref 11.5–15)
PLATELET # BLD: 208 E9/L (ref 130–450)
PMV BLD AUTO: 10.3 FL (ref 7–12)
POTASSIUM SERPL-SCNC: 3.6 MMOL/L (ref 3.5–5)
RBC # BLD: 3.58 E12/L (ref 3.5–5.5)
SODIUM BLD-SCNC: 139 MMOL/L (ref 132–146)
TOTAL PROTEIN: 5.9 G/DL (ref 6.4–8.3)
WBC # BLD: 5.7 E9/L (ref 4.5–11.5)

## 2019-05-28 PROCEDURE — 6360000002 HC RX W HCPCS: Performed by: INTERNAL MEDICINE

## 2019-05-28 PROCEDURE — 6370000000 HC RX 637 (ALT 250 FOR IP): Performed by: INTERNAL MEDICINE

## 2019-05-28 PROCEDURE — 6370000000 HC RX 637 (ALT 250 FOR IP): Performed by: SPECIALIST

## 2019-05-28 PROCEDURE — 76705 ECHO EXAM OF ABDOMEN: CPT

## 2019-05-28 PROCEDURE — 85027 COMPLETE CBC AUTOMATED: CPT

## 2019-05-28 PROCEDURE — 36415 COLL VENOUS BLD VENIPUNCTURE: CPT

## 2019-05-28 PROCEDURE — 80053 COMPREHEN METABOLIC PANEL: CPT

## 2019-05-28 PROCEDURE — 2580000003 HC RX 258: Performed by: INTERNAL MEDICINE

## 2019-05-28 PROCEDURE — 83036 HEMOGLOBIN GLYCOSYLATED A1C: CPT

## 2019-05-28 RX ORDER — PROMETHAZINE HYDROCHLORIDE 25 MG/1
12.5 TABLET ORAL EVERY 12 HOURS PRN
Status: DISCONTINUED | OUTPATIENT
Start: 2019-05-28 | End: 2019-05-28 | Stop reason: HOSPADM

## 2019-05-28 RX ORDER — CIPROFLOXACIN 500 MG/1
500 TABLET, FILM COATED ORAL EVERY 12 HOURS SCHEDULED
Status: DISCONTINUED | OUTPATIENT
Start: 2019-05-28 | End: 2019-05-28 | Stop reason: HOSPADM

## 2019-05-28 RX ORDER — L. ACIDOPHILUS/L.BULGARICUS 100MM CELL
1 GRANULES IN PACKET (EA) ORAL 2 TIMES DAILY
Qty: 60 PACKET | Refills: 0 | Status: SHIPPED | OUTPATIENT
Start: 2019-05-28 | End: 2019-06-27

## 2019-05-28 RX ORDER — CIPROFLOXACIN 500 MG/1
500 TABLET, FILM COATED ORAL EVERY 12 HOURS SCHEDULED
Qty: 24 TABLET | Refills: 0 | Status: SHIPPED | OUTPATIENT
Start: 2019-05-28 | End: 2019-06-09

## 2019-05-28 RX ADMIN — CIPROFLOXACIN HYDROCHLORIDE 500 MG: 500 TABLET, FILM COATED ORAL at 12:29

## 2019-05-28 RX ADMIN — PANTOPRAZOLE SODIUM 40 MG: 40 TABLET, DELAYED RELEASE ORAL at 06:22

## 2019-05-28 RX ADMIN — LEVOTHYROXINE SODIUM 125 MCG: 125 TABLET ORAL at 06:23

## 2019-05-28 RX ADMIN — WATER 2 G: 1 INJECTION INTRAMUSCULAR; INTRAVENOUS; SUBCUTANEOUS at 14:44

## 2019-05-28 RX ADMIN — POTASSIUM CHLORIDE AND SODIUM CHLORIDE: 900; 300 INJECTION, SOLUTION INTRAVENOUS at 04:28

## 2019-05-28 ASSESSMENT — PAIN SCALES - GENERAL: PAINLEVEL_OUTOF10: 0

## 2019-05-28 NOTE — PLAN OF CARE
Problem: Falls - Risk of:  Goal: Will remain free from falls  Description  Will remain free from falls  5/28/2019 0825 by Eva Bass RN  Outcome: Met This Shift     Problem: Falls - Risk of:  Goal: Absence of physical injury  Description  Absence of physical injury  5/28/2019 0825 by Eva Bass RN  Outcome: Met This Shift     Problem: Pain:  Goal: Pain level will decrease  Description  Pain level will decrease  5/28/2019 0825 by Eva Bass RN  Outcome: Met This Shift     Problem: Pain:  Goal: Control of acute pain  Description  Control of acute pain  5/28/2019 0825 by Eva Bass RN  Outcome: Met This Shift     Problem: Pain:  Goal: Control of chronic pain  Description  Control of chronic pain  5/28/2019 0825 by Eva Bass RN  Outcome: Met This Shift

## 2019-05-28 NOTE — PLAN OF CARE
Problem: Falls - Risk of:  Goal: Will remain free from falls  Description  Will remain free from falls  5/28/2019 1732 by Vicky Villanueva RN  Outcome: Met This Shift

## 2019-05-28 NOTE — PROGRESS NOTES
awake, alert, and oriented. Skin: Warm and dry. No rashes were noted. HEENT: Eyes show round, and reactive pupils. No jaundice. Neck: Supple to movements. No lymphadenopathy. Chest: No use of accessory muscles to breathe. Symmetrical expansion. Auscultation reveals no wheezing, crackles, or rhonchi. Cardiovascular: S1 and S2 are rhythmic and regular. No murmurs appreciated. Abdomen: Positive bowel sounds to auscultation. Benign to palpation. Extremities: No clubbing, no cyanosis, no edema.   CNS: awake and alert  Psych:  pleasant  Lines: peripheral    I & O - 24hr:    Intake/Output Summary (Last 24 hours) at 5/28/2019 0959  Last data filed at 5/28/2019 5238  Gross per 24 hour   Intake 1513 ml   Output --   Net 1513 ml     I/O this shift:  In: 180 [P.O.:180]  Out: -    Weight change: 6 lb 7 oz (2.92 kg)  LABS:    Recent Labs     05/26/19  1015 05/27/19 0437 05/28/19  0512   WBC 6.7 6.7 5.7   HGB 11.1* 10.9* 10.4*   HCT 32.9* 32.7* 30.7*   MCV 86.1 87.4 85.8    162 208     Recent Labs     05/26/19  1015 05/27/19  0437 05/28/19  0512    135 139   K 3.0* 3.3* 3.6   CL 97* 99 105   CO2 24 25 25   BUN 14 16 12   CREATININE 0.7 0.8 0.6   GFRAA >60 >60 >60   LABGLOM >60 >60 >60   GLUCOSE 141* 106* 105*   PROT  --  6.4 5.9*   LABALBU  --  3.1* 2.7*   CALCIUM 8.3* 8.3* 7.9*   BILITOT  --  0.6 0.4   ALKPHOS  --  113* 117*   AST  --  28 24   ALT  --  36* 35*        Recent Labs     05/27/19 0437   PROCAL 0.84*     Lab Results   Component Value Date    SEDRATE 62 (H) 05/27/2019    SEDRATE 80 (H) 07/19/2017    SEDRATE 73 (H) 04/21/2017     Lab Results   Component Value Date    CRP 13.9 (H) 05/27/2019    CRP 1.5 (H) 04/21/2017     MICROBIOLOGY:   Recent Labs     05/26/19  1015   BC 24 Hours- no growth     Recent Labs     05/25/19  1128   BLOODCULT2 24 Hours- no growth     Lab Results   Component Value Date    BLOODCULT2 24 Hours- no growth 05/25/2019     Organism   Date Value Ref Range Status

## 2019-05-28 NOTE — PLAN OF CARE
Problem: Falls - Risk of:  Goal: Will remain free from falls  5/27/2019 2304 by Yokasta Shields RN  Outcome: Met This Shift     Problem: Falls - Risk of:  Goal: Absence of physical injury  5/27/2019 2304 by Yokasta Shields RN  Outcome: Met This Shift     Problem: Pain:  Goal: Pain level will decrease  5/27/2019 2304 by Yokasta Shields RN  Outcome: Met This Shift     Problem: Pain:  Goal: Control of acute pain  5/27/2019 2304 by Yokasta Shields RN  Outcome: Met This Shift     Problem: Pain:  Goal: Control of chronic pain  5/27/2019 2304 by Yokasta Shields RN  Outcome: Met This Shift

## 2019-05-28 NOTE — DISCHARGE SUMMARY
telemetry under the care of Dr. Marquez Pulido and Brock Perez. LABS[de-identified]  Lab Results   Component Value Date    WBC 5.7 05/28/2019    HGB 10.4 (L) 05/28/2019    HCT 30.7 (L) 05/28/2019     05/28/2019     05/28/2019    K 3.6 05/28/2019     05/28/2019    CREATININE 0.6 05/28/2019    BUN 12 05/28/2019    CO2 25 05/28/2019    GLUCOSE 105 (H) 05/28/2019    ALT 35 (H) 05/28/2019    AST 24 05/28/2019    INR 1.1 04/17/2017     Lab Results   Component Value Date    INR 1.1 04/17/2017    INR 1.4 03/20/2017    INR 1.6 03/14/2017    PROTIME 12.5 (H) 04/17/2017    PROTIME 15.7 (H) 03/20/2017    PROTIME 17.8 (H) 03/14/2017      Lab Results   Component Value Date    TSH 3.550 05/27/2019     Lab Results   Component Value Date    TRIG 179 (H) 05/27/2019    TRIG 139 08/10/2018    TRIG 162 (H) 07/19/2017     Lab Results   Component Value Date    HDL 22 05/27/2019    HDL 62 08/10/2018    HDL 46 07/19/2017     Lab Results   Component Value Date    LDLCALC 72 05/27/2019    LDLCALC 126 (H) 08/10/2018    LDLCALC 118 (H) 07/19/2017     Lab Results   Component Value Date    LABA1C 5.0 05/27/2019       IMAGING:  Xr Chest Standard (2 Vw)    Result Date: 5/26/2019  Location:100 Exam: XR CHEST (2 VW) Indications: Lethargy, following asleep during exam. Fever, nausea, vomiting, diarrhea Findings: 2 views of the chest were obtained and compared to the previous exam of 5/25/2019 . Slightly limited level of inspiration is present, with resultant crowding of the pulmonary vascular markings. There is mild linear density within the left mid lung, not well seen on the lateral view. Findings are similar to previous study and most likely represent chronic scarring. No acute pulmonary infiltrate, pleural effusion, or pneumothorax is seen bilaterally. Cardiac silhouette remains enlarged. No findings to suggest pulmonary edema are noted. Osseous structures are not significantly changed, as visualized. Degenerative changes of the thoracic spine are seen. 1. Slightly limited level of inspiration. 2. Mild left midlung linear scar. 3. No acute cardiopulmonary disease or significant change since previous study of 5/25/2019     Xr Chest Standard (2 Vw)    Result Date: 5/25/2019  LOCATION: 200 EXAM: XR CHEST (2 VW) COMPARISON: 2/15/2019 HISTORY: Shortness of breath TECHNIQUE: PA and lateral  views of the chest were obtained. FINDINGS:  SUPPORT DEVICES: None. LUNGS: Radiograph is rotated with no areas of consolidation. Bandlike scarring again seen in the left midlung zone. PLEURA: No effusions or pneumothorax. LUNG VOLUMES: Satisfactory inspirator effort. MEDIASTINAL STRUCTURES: No lymphadenopathy. Normal aortic contour. HEART SIZE: Normal. UPPER ABDOMEN: Unremarkable. BONES AND SOFT TISSUES: No fracture or soft tissue abnormality. 1. No active cardiopulmonary disease. Ct Head W Wo Contrast    Result Date: 5/25/2019  LOCATION: 200 EXAM: CT HEAD W WO CONTRAST COMPARISON: None HISTORY: Fever with headaches TECHNIQUE: Contrast-enhanced CT images were performed of the head. Coronal and sagittal reconstructions also obtained. Automated dose control was used for this exam. CONTRAST: 80 mL Isovue-370  intravenous contrast was administered. FINDINGS: HEAD: No enhancing lesions identified. The brain parenchyma is normal in density. There is no evidence of intracranial hemorrhage or mass. No extra-axial fluid is seen. The paranasal sinuses are clear. The globes and orbits are normal.  No abnormalities of the calvarium are identified. No hemorrhage or mass. Ct Abdomen Pelvis W Iv Contrast Additional Contrast? None    Result Date: 5/25/2019  Reading location:  200 HISTORY: Left flank pain Serial axial images of the abdomen and pelvis were obtained following the uneventful ministration of 80 cc of Isovue-370 intravenous contrast. Reformatted sagittal coronal images were obtained. Oral contrast was not administered. The lung bases are clear.  The liver is unremarkable. The spleen is enlarged and measures 18 cm x 16 cm x 10 cm. The pancreas is fatty-replaced. The adrenal glands are unremarkable. The right kidney is unremarkable. The left kidney is mildly edematous. Perinephric induration is noted. There is no evidence of hydronephrosis. There is no calculus seen within the course of the left ureter. There is no calculus within urinary bladder. The gallbladder is unremarkable. There is no large or small bowel obstruction. There are a few scattered sigmoid diverticula. There is no evidence of diverticulitis. The abdominal aorta is normal in caliber. There is a left upper quadrant abdominal wall defect consistent with a hernia. Mesenteric fat is herniated only. The border of the hernia is poorly defined but measures approximately 7 cm x 6 cm. 1. Mild left perinephric stranding and mild edematous changes of the left kidney consistent with mild left pyelonephritis. 2. Splenomegaly. 3. Left upper quadrant ventral hernia containing mesenteric fat only. HOSPITAL COURSE:   Charity Villanueva did very well throughout the hospital stay. Following IV antibiotic therapy and aggressive IV fluid resuscitation. Her overall symptomatology improved. Original blood culture returned positive for pansensitive E. coli and antibiotics were adjusted accordingly. She became ambulatory and was tolerating a diet. Her daughter was updated at the bedside and the patient was deemed acceptable for discharge home to complete a course of oral antibiotic therapy. BRIEF PHYSICAL EXAMINATION AND LABORATORIES ON DAY OF DISCHARGE:  VITALS:  /61   Pulse 74   Temp 98.1 °F (36.7 °C) (Oral)   Resp 17   Ht 6' (1.829 m)   Wt (!) 346 lb (156.9 kg)   SpO2 96%   BMI 46.93 kg/m²     HEENT:  PERRLA. EOMI. Sclera clear. Buccal mucosa moist.    Neck:  Supple. Trachea midline. No thyromegaly. No JVD. No bruits. Heart:  Rhythm regular, rate controlled. No murmurs.     Lungs:  Symmetrical. Clear to auscultation bilaterally. No wheezes. No rhonchi. No rales. Abdomen: Soft. Non-tender. Non-distended. Bowel sounds positive. No organomegaly or masses. No pain on palpation    Extremities:  Peripheral pulses present. No peripheral edema. No ulcers. Neurologic:  Alert x 3. No focal deficit. Cranial nerves grossly intact. Skin:  No petechia. No hemorrhage. No wounds. DISPOSITION:  The patient's condition is good. At this time the patient is without objective evidence of an acute process requiring continuing hospitalization or inpatient management. They are stable for discharge with outpatient follow-up. I have spoken with the patient and discussed the results of the current hospitalization, in addition to providing specific details for the plan of care and counseling regarding the diagnosis and prognosis. The plan has been discussed in detail and they are aware of the specific conditions for emergent return, as well as the importance of follow-up.   Their questions are answered at this time and they are agreeable with the plan for discharge to home    DISCHARGE MEDICATIONS:    Nasrin Vargas   Home Medication Instructions [facility]:[id number]    Printed on:05/28/19 1053   Medication Information                      ENBREL MINI 50 MG/ML SOCT  inject 50 mg subcutaneously once a week on Monday             folic acid (FOLVITE) 1 MG tablet  Take 1 mg by mouth daily             ibuprofen (ADVIL;MOTRIN) 800 MG tablet               levothyroxine (SYNTHROID) 125 MCG tablet  take 1 tablet by mouth once daily             methotrexate (RHEUMATREX) 2.5 MG chemo tablet               Multiple Vitamins-Minerals (MULTI-VITAMIN GUMMIES PO)  Take 2 tablets by mouth daily             omeprazole (PRILOSEC) 20 MG delayed release capsule  take 1 capsule by mouth once daily             ondansetron (ZOFRAN) 4 MG tablet  Take 1 tablet by mouth 3 times daily as needed for Nausea or Vomiting             sertraline (ZOLOFT) 100 MG tablet  take 1 tablet by mouth once daily             tears naturale II (CELLUGEL) SOLN ophthalmic solution  Place 1 drop into the left eye as needed for Dry Eyes                 FOLLOW UP/INSTRUCTIONS:  · This patient is instructed to follow-up with her primary care physician. · Patient is instructed to follow-up with the consults listed above as directed by them. · she is instructed to resume home medications and take new medications as indicated in the list above. · If the patient has a recurrence of symptoms, she is instructed to go to the ED. Preparing for this patient's discharge, including paperwork, orders, instructions, and meeting with patient did require > 30 minutes.     Niru Chavis DO     5/28/2019  10:50 AM

## 2019-05-28 NOTE — PLAN OF CARE
Problem: Falls - Risk of:  Goal: Will remain free from falls  5/28/2019 0302 by Brennan Weeks RN  Outcome: Met This Shift  5/27/2019 2304 by Christelle Rodriguez RN  Outcome: Met This Shift  Goal: Absence of physical injury  5/28/2019 0302 by Brennan Weeks RN  Outcome: Met This Shift  5/27/2019 2304 by Christelle Rodriguez RN  Outcome: Met This Shift     Problem: Pain:  Goal: Pain level will decrease  5/28/2019 0302 by Brennan Weeks RN  Outcome: Met This Shift  5/27/2019 2304 by Christelle Rodriguez RN  Outcome: Met This Shift  Goal: Control of acute pain  5/28/2019 0302 by Brennan Weeks RN  Outcome: Met This Shift  5/27/2019 2304 by Christelle Rodriguez RN  Outcome: Met This Shift  Goal: Control of chronic pain  5/28/2019 0302 by Brennan Weeks RN  Outcome: Met This Shift  5/27/2019 2304 by Christelle Rodriguez RN  Outcome: Met This Shift

## 2019-05-28 NOTE — PROGRESS NOTES
[facility] Quality Flow/Interdisciplinary Rounds Progress Note        Quality Flow Rounds held on May 28, 2019    Disciplines Attending:  Bedside Nurse, ,  and Nursing Unit Leadership    Xandre Michel was admitted on 5/26/2019  9:10 AM    Anticipated Discharge Date:  Expected Discharge Date: 05/29/19    Disposition:    Ever Score:  Ever Scale Score: 19    Readmission Risk              Risk of Unplanned Readmission:        11           Discussed patient goal for the day, patient clinical progression, and barriers to discharge.   The following Goal(s) of the Day/Commitment(s) have been identified:  Activity progression, IV F, awaiting cultures      Misty Aguilera  May 28, 2019

## 2019-05-29 LAB — URINE CULTURE, ROUTINE: NORMAL

## 2019-05-30 ENCOUNTER — PATIENT MESSAGE (OUTPATIENT)
Dept: FAMILY MEDICINE CLINIC | Age: 64
End: 2019-05-30

## 2019-05-30 LAB — CULTURE, BLOOD 2: NORMAL

## 2019-05-30 NOTE — TELEPHONE ENCOUNTER
From: Zara Hernandez  To: Elham Chatterjee DO  Sent: 5/30/2019 8:30 AM EDT  Subject: Prescription Question    Hello. I am very nauseous from the cipro I was prescribed and they told me in the hospital I couldn't take the zofran with it. Is there a different kind the dr could call in that I can take?

## 2019-05-31 LAB — BLOOD CULTURE, ROUTINE: NORMAL

## 2019-06-01 LAB — ORGANISM: ABNORMAL

## 2019-06-11 ENCOUNTER — TELEPHONE (OUTPATIENT)
Dept: FAMILY MEDICINE CLINIC | Age: 64
End: 2019-06-11

## 2019-06-11 NOTE — TELEPHONE ENCOUNTER
Letter sent to patient regarding overdue mammogram. Included in the letter was the existing Mammogram order and the dates and time for the [address] that will be here at the [facility].

## 2019-09-18 ENCOUNTER — HOSPITAL ENCOUNTER (OUTPATIENT)
Age: 64
Discharge: HOME | End: 2019-09-20
Payer: MEDICARE

## 2019-09-18 ENCOUNTER — OFFICE VISIT (OUTPATIENT)
Dept: FAMILY MEDICINE CLINIC | Age: 64
End: 2019-09-18
Payer: MEDICARE

## 2019-09-18 VITALS
SYSTOLIC BLOOD PRESSURE: 122 MMHG | WEIGHT: 293 LBS | HEART RATE: 88 BPM | BODY MASS INDEX: 39.68 KG/M2 | TEMPERATURE: 97.3 F | HEIGHT: 72 IN | OXYGEN SATURATION: 97 % | DIASTOLIC BLOOD PRESSURE: 74 MMHG

## 2019-09-18 DIAGNOSIS — R55 POSTURAL DIZZINESS WITH NEAR SYNCOPE: ICD-10-CM

## 2019-09-18 DIAGNOSIS — E55.9 VITAMIN D DEFICIENCY: ICD-10-CM

## 2019-09-18 DIAGNOSIS — E03.9 ACQUIRED HYPOTHYROIDISM: ICD-10-CM

## 2019-09-18 DIAGNOSIS — R55 POSTURAL DIZZINESS WITH NEAR SYNCOPE: Primary | ICD-10-CM

## 2019-09-18 DIAGNOSIS — R42 POSTURAL DIZZINESS WITH NEAR SYNCOPE: Primary | ICD-10-CM

## 2019-09-18 DIAGNOSIS — H65.93 FLUID LEVEL BEHIND TYMPANIC MEMBRANE OF BOTH EARS: ICD-10-CM

## 2019-09-18 DIAGNOSIS — R42 POSTURAL DIZZINESS WITH NEAR SYNCOPE: ICD-10-CM

## 2019-09-18 LAB
ALBUMIN SERPL-MCNC: 4.9 G/DL (ref 3.5–5.2)
ALP BLD-CCNC: 122 U/L (ref 35–104)
ALT SERPL-CCNC: 25 U/L (ref 0–32)
ANION GAP SERPL CALCULATED.3IONS-SCNC: 16 MMOL/L (ref 7–16)
AST SERPL-CCNC: 25 U/L (ref 0–31)
BASOPHILS ABSOLUTE: 0.05 E9/L (ref 0–0.2)
BASOPHILS RELATIVE PERCENT: 0.8 % (ref 0–2)
BILIRUB SERPL-MCNC: 0.5 MG/DL (ref 0–1.2)
BUN BLDV-MCNC: 19 MG/DL (ref 8–23)
CALCIUM SERPL-MCNC: 9.3 MG/DL (ref 8.6–10.2)
CHLORIDE BLD-SCNC: 103 MMOL/L (ref 98–107)
CO2: 22 MMOL/L (ref 22–29)
CREAT SERPL-MCNC: 0.8 MG/DL (ref 0.5–1)
EOSINOPHILS ABSOLUTE: 0.12 E9/L (ref 0.05–0.5)
EOSINOPHILS RELATIVE PERCENT: 1.8 % (ref 0–6)
GFR AFRICAN AMERICAN: >60
GFR NON-AFRICAN AMERICAN: >60 ML/MIN/1.73
GLUCOSE BLD-MCNC: 127 MG/DL (ref 74–99)
HCT VFR BLD CALC: 49.1 % (ref 34–48)
HEMOGLOBIN: 15.5 G/DL (ref 11.5–15.5)
IMMATURE GRANULOCYTES #: 0.06 E9/L
IMMATURE GRANULOCYTES %: 0.9 % (ref 0–5)
LYMPHOCYTES ABSOLUTE: 0.91 E9/L (ref 1.5–4)
LYMPHOCYTES RELATIVE PERCENT: 13.9 % (ref 20–42)
MCH RBC QN AUTO: 28.4 PG (ref 26–35)
MCHC RBC AUTO-ENTMCNC: 31.6 % (ref 32–34.5)
MCV RBC AUTO: 90.1 FL (ref 80–99.9)
MONOCYTES ABSOLUTE: 0.5 E9/L (ref 0.1–0.95)
MONOCYTES RELATIVE PERCENT: 7.6 % (ref 2–12)
NEUTROPHILS ABSOLUTE: 4.92 E9/L (ref 1.8–7.3)
NEUTROPHILS RELATIVE PERCENT: 75 % (ref 43–80)
PDW BLD-RTO: 13.9 FL (ref 11.5–15)
PLATELET # BLD: 222 E9/L (ref 130–450)
PMV BLD AUTO: 10.4 FL (ref 7–12)
POTASSIUM SERPL-SCNC: 4.8 MMOL/L (ref 3.5–5)
RBC # BLD: 5.45 E12/L (ref 3.5–5.5)
SODIUM BLD-SCNC: 141 MMOL/L (ref 132–146)
T4 FREE: 1.68 NG/DL (ref 0.93–1.7)
TOTAL PROTEIN: 7.9 G/DL (ref 6.4–8.3)
TSH SERPL DL<=0.05 MIU/L-ACNC: 2.77 UIU/ML (ref 0.27–4.2)
VITAMIN D 25-HYDROXY: 25 NG/ML (ref 30–100)
WBC # BLD: 6.6 E9/L (ref 4.5–11.5)

## 2019-09-18 PROCEDURE — 84443 ASSAY THYROID STIM HORMONE: CPT

## 2019-09-18 PROCEDURE — 84439 ASSAY OF FREE THYROXINE: CPT

## 2019-09-18 PROCEDURE — 85025 COMPLETE CBC W/AUTO DIFF WBC: CPT

## 2019-09-18 PROCEDURE — 80053 COMPREHEN METABOLIC PANEL: CPT

## 2019-09-18 PROCEDURE — 99213 OFFICE O/P EST LOW 20 MIN: CPT | Performed by: FAMILY MEDICINE

## 2019-09-18 PROCEDURE — 36415 COLL VENOUS BLD VENIPUNCTURE: CPT

## 2019-09-18 PROCEDURE — 82306 VITAMIN D 25 HYDROXY: CPT

## 2019-09-18 RX ORDER — ABATACEPT 125 MG/ML
INJECTION, SOLUTION SUBCUTANEOUS
COMMUNITY
Start: 2019-09-12 | End: 2020-04-29

## 2019-09-18 RX ORDER — FLUTICASONE PROPIONATE 50 MCG
1 SPRAY, SUSPENSION (ML) NASAL DAILY
Qty: 1 BOTTLE | Refills: 1 | Status: SHIPPED | OUTPATIENT
Start: 2019-09-18 | End: 2019-11-14 | Stop reason: SDUPTHER

## 2019-09-18 RX ORDER — ADALIMUMAB 40MG/0.4ML
KIT SUBCUTANEOUS
Refills: 12 | COMMUNITY
Start: 2019-08-06 | End: 2019-09-18

## 2019-09-18 RX ORDER — PREDNISONE 1 MG/1
TABLET ORAL
Refills: 0 | COMMUNITY
Start: 2019-09-03 | End: 2019-10-10 | Stop reason: CLARIF

## 2019-09-18 ASSESSMENT — PATIENT HEALTH QUESTIONNAIRE - PHQ9
SUM OF ALL RESPONSES TO PHQ QUESTIONS 1-9: 0
SUM OF ALL RESPONSES TO PHQ9 QUESTIONS 1 & 2: 0
1. LITTLE INTEREST OR PLEASURE IN DOING THINGS: 0
2. FEELING DOWN, DEPRESSED OR HOPELESS: 0
SUM OF ALL RESPONSES TO PHQ QUESTIONS 1-9: 0

## 2019-09-18 ASSESSMENT — ENCOUNTER SYMPTOMS
SHORTNESS OF BREATH: 0
SINUS PRESSURE: 1

## 2019-10-07 ENCOUNTER — TELEPHONE (OUTPATIENT)
Dept: FAMILY MEDICINE CLINIC | Age: 64
End: 2019-10-07

## 2019-10-07 LAB
ALBUMIN SERPL-MCNC: 4.5 G/DL
ALP BLD-CCNC: 104 U/L
ALT SERPL-CCNC: 27 U/L
ANION GAP SERPL CALCULATED.3IONS-SCNC: NORMAL MMOL/L
AST SERPL-CCNC: 22 U/L
BASOPHILS ABSOLUTE: 30 /ΜL
BASOPHILS RELATIVE PERCENT: 0.8 %
BILIRUB SERPL-MCNC: 0.6 MG/DL (ref 0.1–1.4)
BUN BLDV-MCNC: 20 MG/DL
CALCIUM SERPL-MCNC: 9.4 MG/DL
CHLORIDE BLD-SCNC: 104 MMOL/L
CO2: 31 MMOL/L
CREAT SERPL-MCNC: 0.72 MG/DL
EOSINOPHILS ABSOLUTE: 89 /ΜL
EOSINOPHILS RELATIVE PERCENT: 2.4 %
GFR CALCULATED: 87
GLUCOSE BLD-MCNC: 108 MG/DL
HCT VFR BLD CALC: 40 % (ref 36–46)
HEMOGLOBIN: 13.4 G/DL (ref 12–16)
LYMPHOCYTES ABSOLUTE: 725 /ΜL
LYMPHOCYTES RELATIVE PERCENT: 19.6 %
MCH RBC QN AUTO: 29.1 PG
MCHC RBC AUTO-ENTMCNC: 33.5 G/DL
MCV RBC AUTO: 86.8 FL
MONOCYTES ABSOLUTE: 451 /ΜL
MONOCYTES RELATIVE PERCENT: 12.2 %
NEUTROPHILS ABSOLUTE: 2405 /ΜL
NEUTROPHILS RELATIVE PERCENT: 65 %
PDW BLD-RTO: 14 %
PLATELET # BLD: 176 K/ΜL
PMV BLD AUTO: 9.5 FL
POTASSIUM SERPL-SCNC: 4.1 MMOL/L
RBC # BLD: 4.61 10^6/ΜL
SEDIMENTATION RATE, ERYTHROCYTE: 11
SODIUM BLD-SCNC: 140 MMOL/L
TOTAL PROTEIN: 7.1
WBC # BLD: 3.7 10^3/ML

## 2019-10-10 ENCOUNTER — OFFICE VISIT (OUTPATIENT)
Dept: FAMILY MEDICINE CLINIC | Age: 64
End: 2019-10-10
Payer: MEDICARE

## 2019-10-10 VITALS
SYSTOLIC BLOOD PRESSURE: 132 MMHG | HEIGHT: 72 IN | OXYGEN SATURATION: 95 % | TEMPERATURE: 97.7 F | HEART RATE: 71 BPM | WEIGHT: 293 LBS | DIASTOLIC BLOOD PRESSURE: 74 MMHG | BODY MASS INDEX: 39.68 KG/M2

## 2019-10-10 DIAGNOSIS — E03.9 ACQUIRED HYPOTHYROIDISM: Primary | ICD-10-CM

## 2019-10-10 DIAGNOSIS — R42 POSTURAL DIZZINESS WITH NEAR SYNCOPE: ICD-10-CM

## 2019-10-10 DIAGNOSIS — E55.9 VITAMIN D DEFICIENCY: ICD-10-CM

## 2019-10-10 DIAGNOSIS — F32.A ANXIETY AND DEPRESSION: ICD-10-CM

## 2019-10-10 DIAGNOSIS — Z12.31 BREAST CANCER SCREENING BY MAMMOGRAM: ICD-10-CM

## 2019-10-10 DIAGNOSIS — R01.1 SYSTOLIC MURMUR: ICD-10-CM

## 2019-10-10 DIAGNOSIS — R55 POSTURAL DIZZINESS WITH NEAR SYNCOPE: ICD-10-CM

## 2019-10-10 DIAGNOSIS — F41.9 ANXIETY AND DEPRESSION: ICD-10-CM

## 2019-10-10 DIAGNOSIS — Z76.0 MEDICATION REFILL: ICD-10-CM

## 2019-10-10 DIAGNOSIS — K21.9 GASTROESOPHAGEAL REFLUX DISEASE, ESOPHAGITIS PRESENCE NOT SPECIFIED: ICD-10-CM

## 2019-10-10 PROCEDURE — 99214 OFFICE O/P EST MOD 30 MIN: CPT | Performed by: FAMILY MEDICINE

## 2019-10-10 RX ORDER — LEVOTHYROXINE SODIUM 0.12 MG/1
TABLET ORAL
Qty: 30 TABLET | Refills: 5 | Status: SHIPPED
Start: 2019-10-10 | End: 2020-03-10

## 2019-10-10 RX ORDER — SERTRALINE HYDROCHLORIDE 100 MG/1
TABLET, FILM COATED ORAL
Qty: 30 TABLET | Refills: 5 | Status: SHIPPED
Start: 2019-10-10 | End: 2020-03-10

## 2019-10-10 RX ORDER — OMEPRAZOLE 20 MG/1
CAPSULE, DELAYED RELEASE ORAL
Qty: 30 CAPSULE | Refills: 5 | Status: SHIPPED
Start: 2019-10-10 | End: 2020-03-10

## 2019-10-10 ASSESSMENT — ENCOUNTER SYMPTOMS: SHORTNESS OF BREATH: 0

## 2019-11-12 ENCOUNTER — HOSPITAL ENCOUNTER (OUTPATIENT)
Dept: CARDIOLOGY | Age: 64
Discharge: HOME | End: 2019-11-12
Payer: MEDICARE

## 2019-11-12 DIAGNOSIS — R01.1 SYSTOLIC MURMUR: ICD-10-CM

## 2019-11-12 LAB
LV EF: 60 %
LVEF MODALITY: NORMAL

## 2019-11-12 PROCEDURE — 93306 TTE W/DOPPLER COMPLETE: CPT

## 2019-11-14 DIAGNOSIS — H65.93 FLUID LEVEL BEHIND TYMPANIC MEMBRANE OF BOTH EARS: ICD-10-CM

## 2019-11-15 RX ORDER — FLUTICASONE PROPIONATE 50 MCG
SPRAY, SUSPENSION (ML) NASAL
Qty: 16 G | Refills: 1 | Status: SHIPPED | OUTPATIENT
Start: 2019-11-15 | End: 2019-11-15 | Stop reason: SDUPTHER

## 2019-11-15 RX ORDER — FLUTICASONE PROPIONATE 50 MCG
1 SPRAY, SUSPENSION (ML) NASAL DAILY
Qty: 1 BOTTLE | Refills: 1 | Status: SHIPPED
Start: 2019-11-15 | End: 2020-03-10

## 2019-11-26 ASSESSMENT — ENCOUNTER SYMPTOMS
COUGH: 0
ABDOMINAL PAIN: 0
DIARRHEA: 0
SHORTNESS OF BREATH: 0

## 2019-11-29 ENCOUNTER — OFFICE VISIT (OUTPATIENT)
Dept: FAMILY MEDICINE CLINIC | Age: 64
End: 2019-11-29
Payer: MEDICARE

## 2019-11-29 VITALS
TEMPERATURE: 97.9 F | OXYGEN SATURATION: 94 % | SYSTOLIC BLOOD PRESSURE: 158 MMHG | HEART RATE: 70 BPM | HEIGHT: 72 IN | BODY MASS INDEX: 46.65 KG/M2 | DIASTOLIC BLOOD PRESSURE: 87 MMHG

## 2019-11-29 DIAGNOSIS — M54.16 LUMBAR RADICULOPATHY: Primary | ICD-10-CM

## 2019-11-29 DIAGNOSIS — M79.605 LEFT LEG PAIN: ICD-10-CM

## 2019-11-29 DIAGNOSIS — M06.9 RHEUMATOID ARTHRITIS, INVOLVING UNSPECIFIED SITE, UNSPECIFIED RHEUMATOID FACTOR PRESENCE: ICD-10-CM

## 2019-11-29 PROCEDURE — 96372 THER/PROPH/DIAG INJ SC/IM: CPT | Performed by: PHYSICIAN ASSISTANT

## 2019-11-29 PROCEDURE — 99213 OFFICE O/P EST LOW 20 MIN: CPT | Performed by: PHYSICIAN ASSISTANT

## 2019-11-29 RX ORDER — KETOROLAC TROMETHAMINE 30 MG/ML
30 INJECTION, SOLUTION INTRAMUSCULAR; INTRAVENOUS ONCE
Status: COMPLETED | OUTPATIENT
Start: 2019-11-29 | End: 2019-11-29

## 2019-11-29 RX ORDER — TRIAMCINOLONE ACETONIDE 40 MG/ML
40 INJECTION, SUSPENSION INTRA-ARTICULAR; INTRAMUSCULAR ONCE
Status: COMPLETED | OUTPATIENT
Start: 2019-11-29 | End: 2019-11-29

## 2019-11-29 RX ADMIN — KETOROLAC TROMETHAMINE 30 MG: 30 INJECTION, SOLUTION INTRAMUSCULAR; INTRAVENOUS at 15:50

## 2019-11-29 RX ADMIN — TRIAMCINOLONE ACETONIDE 40 MG: 40 INJECTION, SUSPENSION INTRA-ARTICULAR; INTRAMUSCULAR at 15:51

## 2019-11-29 ASSESSMENT — ENCOUNTER SYMPTOMS
TROUBLE SWALLOWING: 0
CHEST TIGHTNESS: 0
BACK PAIN: 1

## 2020-01-02 ENCOUNTER — OFFICE VISIT (OUTPATIENT)
Dept: FAMILY MEDICINE CLINIC | Age: 65
End: 2020-01-02
Payer: MEDICARE

## 2020-01-02 VITALS
WEIGHT: 293 LBS | OXYGEN SATURATION: 96 % | DIASTOLIC BLOOD PRESSURE: 74 MMHG | TEMPERATURE: 97.6 F | HEART RATE: 63 BPM | SYSTOLIC BLOOD PRESSURE: 132 MMHG | BODY MASS INDEX: 39.68 KG/M2 | HEIGHT: 72 IN

## 2020-01-02 PROCEDURE — 99213 OFFICE O/P EST LOW 20 MIN: CPT | Performed by: FAMILY MEDICINE

## 2020-01-02 RX ORDER — GABAPENTIN 300 MG/1
CAPSULE ORAL
Qty: 69 CAPSULE | Refills: 0 | Status: SHIPPED
Start: 2020-01-02 | End: 2020-03-10 | Stop reason: ALTCHOICE

## 2020-01-02 ASSESSMENT — ENCOUNTER SYMPTOMS
BACK PAIN: 1
SHORTNESS OF BREATH: 0

## 2020-01-02 NOTE — PROGRESS NOTES
Damion Murillo   Patient is a 75y.o. year old female who presents with:  Chief Complaint   Patient presents with    Lower Back Pain     has pain her lower back, sometimes has pain in her legs that make it difficult to walk. HPI     Low back pain  Onset about one month ago, no known injury but admits to falling around 10/2019  Gradually worsening  Waxes and wanes  Located at the left lower back  Throbbing in character  Intensity 5/10  Exacerbated by prolonged standing  Remitting factors: none, CS, IB has not helped  Admits to pain radiating down the left leg  Denies leg weakness, urinary retention, saddle anesthesia  Chiropractic tx was ineffective    Review of Systems   Constitutional: Negative for chills and fever. Respiratory: Negative for shortness of breath. Cardiovascular: Negative for chest pain. Musculoskeletal: Positive for arthralgias and back pain. Neurological: Negative for weakness and numbness. Health Maintenance Due   Topic Date Due    DTaP/Tdap/Td vaccine (1 - Tdap) 05/28/1964    Breast cancer screen  05/28/1993    Shingles Vaccine (2 of 3) 03/21/2016    Annual Wellness Visit (AWV)  05/29/2019    Flu vaccine (1) 09/01/2019       Current Outpatient Medications   Medication Sig Dispense Refill    gabapentin (NEURONTIN) 300 MG capsule Week 1: take 1 cap nightly.  Week 2: take 1 cap AM, 1 cap PM. Week 3: begin 1 cap three times daily 69 capsule 0    fluticasone (FLONASE) 50 MCG/ACT nasal spray 1 spray by Nasal route daily One spray each nostril daily 1 Bottle 1    Cholecalciferol 2000 units TABS Take 1 tablet by mouth daily 30 tablet 5    levothyroxine (SYNTHROID) 125 MCG tablet take 1 tablet by mouth once daily 30 tablet 5    omeprazole (PRILOSEC) 20 MG delayed release capsule take 1 capsule by mouth once daily 30 capsule 5    sertraline (ZOLOFT) 100 MG tablet take 1 tablet by mouth once daily 30 tablet 5    ORENCIA CLICKJECT 064 MG/ML SOAJ       ondansetron (ZOFRAN) 4 MG tablet Take 1 tablet by mouth 3 times daily as needed for Nausea or Vomiting 30 tablet 0    tears naturale II (CELLUGEL) SOLN ophthalmic solution Place 1 drop into the left eye as needed for Dry Eyes 1 Bottle 2    ibuprofen (ADVIL;MOTRIN) 800 MG tablet       methotrexate (RHEUMATREX) 2.5 MG chemo tablet       Multiple Vitamins-Minerals (MULTI-VITAMIN GUMMIES PO) Take 2 tablets by mouth daily       No current facility-administered medications for this visit.         History    Past Medical History:   Diagnosis Date    Anxiety and depression     Diverticulitis     GERD (gastroesophageal reflux disease)     Rheumatoid arthritis involving multiple sites with positive rheumatoid factor ([facility] [address] 9/12/2017    Thyroid disease        Past Surgical History:   Procedure Laterality Date    APPENDECTOMY      CHOLECYSTECTOMY, LAPAROSCOPIC  02/23/2017    robotic assisted cholecystectomy with IOC    COLONOSCOPY  09/11/2017    OTHER SURGICAL HISTORY  04/11/2017    Laparoscopic Hand Assisted Open Sigmoid Colectomy with Colostomy     OTHER SURGICAL HISTORY  09/12/2017    laparscopic robotic assited colostomy reversed    TONSILLECTOMY         Allergies   Allergen Reactions    Enbrel [Etanercept]      Swelling    Humira [Adalimumab]      Swelling     Penicillins Swelling and Rash     n/v       Family History   Problem Relation Age of Onset    Diabetes Mother     Breast Cancer Mother     Other Father     Heart Disease Father        Social History     Socioeconomic History    Marital status: Single     Spouse name: None    Number of children: None    Years of education: None    Highest education level: None   Occupational History    None   Social Needs    Financial resource strain: None    Food insecurity:     Worry: None     Inability: None    Transportation needs:     Medical: None     Non-medical: None   Tobacco Use    Smoking status: Former Smoker     Packs/day: 0.00     Years: 0.00     Pack years: 0.00 Affect: Mood normal.         Behavior: Behavior normal.       ASSESSMENT AND PLAN    1. Lumbar pain with radiation down left leg  With significant weakness in left dorsiflexion, begin gabapentin. Obtain XR. Declines MRI d/t claustrophobia. Refer for PM&R consult. Provided with relevant printed material.  - XR LUMBAR SPINE (MIN 4 VIEWS); Future  - gabapentin (NEURONTIN) 300 MG capsule; Week 1: take 1 cap nightly. Week 2: take 1 cap AM, 1 cap PM. Week 3: begin 1 cap three times daily  Dispense: 69 capsule; Refill: 0  - [facility]    Return for follow-up from previous visit, or sooner as needed. Toni Toure DO  01/02/20  6:04 PM    Patient Instructions   Patient Education        Learning About Low Back Pain  What is low back pain? Low back pain is pain that can occur anywhere below the ribs and above the legs. It is very common. Almost everyone has it at one time or another. Low back pain can be:  Acute. This is new pain that can last a few days to a few weeks--at the most a few months. Chronic. This pain can last for more than a few months. Sometimes it can last for years. What are some myths about low back pain? Here are some common myths about low back pain--and the facts:  Myth: \"I need to rest my back when I have back pain. \"  Fact: Staying active won't hurt you. It may help you get better faster. Myth: \"I need prescription pain medicine. \"  Fact: It's best to try to let time and being active heal your back. Opioid pain medicines--such as hydrocodone or oxycodone--usually don't work any better than over-the-counter medicines like ibuprofen or naproxen. And opioids can cause serious problems like opioid use disorder or overdose. Moderate to severe opioid use disorder is sometimes called addiction. Myth: \"I need a test like an X-ray or an MRI to diagnose my low back pain. \"  Fact: Getting a test right away won't help you get better faster.  And it could lead you down a treatment path you may not need, since most people get better on their own. What causes low back pain? In most cases, there isn't a clear cause. This can be frustrating, because your back hurts and there's no obvious reason. Your back pain can be caused by:  Overuse or muscle strain. This can happen from playing sports, lifting heavy things, or not being physically fit. A herniated disc. This is a problem with the cushion between the bones in your back. Arthritis. With age, you may have changes in your bones that can narrow the space around your nerves. Other causes. In rare cases, the cause is a serious illness like an infection or cancer. But there are usually other symptoms too. What are the symptoms? Your symptoms depend on your body and the cause of your back pain. You may feel:  · Pain that's sharp or dull. It may be in one small area or over a broad area. But even bad pain doesn't mean that it's caused by something serious. · Leg pain, numbness, or tingling. When a nerve gets squeezed--such as from a disc problem or arthritis--you may have symptoms in your leg or foot. You can even have leg symptoms from a back problem without having any pain in your back. How is low back pain diagnosed? A physical exam is the main way to diagnose low back pain. Your doctor may examine your back, check your nerves by testing your reflexes, and make sure that your muscles are strong. He or she also will ask questions about your back and overall health. Most people don't need any tests right away. Tests often don't show the reason for your pain. If your pain lasts more than 6 weeks or you have symptoms that your doctor is more concerned about, he or she may order tests. These may include an X-ray, a CT scan, or an MRI. In some cases, tests can help your doctor find a cause for your pain, especially for pain in one or both legs. How is low back pain treated?   Most acute low back pain gets better on its own within a few weeks, no matter what the cause. Time and doing usual activities are all that most people need to feel better. Using heat or ice and taking over-the-counter pain medicine also can help while your body heals. If you aren't getting better on your own or your pain is very bad, your doctor may recommend:  · Physical therapy. · Spinal manipulation, such as by a chiropractor. · Acupuncture. · Massage. · Injections of steroid medicine in your back (especially for pain that involves your legs). If you have chronic low back pain, treatment will help you understand and manage your pain. Treatment may include:  · Staying active. This may include walking or doing back exercises. · Physical therapy. · Medicines. Some of these medicines are also used for other problems, like depression. · Pain management. Your doctor may have you see a pain specialist.  · Counseling. Having chronic pain can be hard. It may help to talk to someone who can help you cope with your pain. Surgery isn't needed for most people. But it may help some types of low back pain. Follow-up care is a key part of your treatment and safety. Be sure to make and go to all appointments, and call your doctor if you are having problems. It's also a good idea to know your test results and keep a list of the medicines you take. When should you call for help? Call 911 anytime you think you may need emergency care. For example, call if:  · You can't move a leg at all. Call your doctor now or seek immediate medical care if:  · You have new or worse symptoms in your legs, belly, or buttocks. Symptoms may include:  ? Numbness or tingling. ? Weakness. ? Pain. · You lose bladder or bowel control. Watch closely for changes in your health, and be sure to contact your doctor if:  · Along with the back pain, you have a fever, lose weight, or don't feel well. · You do not get better as expected. Where can you learn more?   Go to [email or web address] and sign in to your [employer] account. Enter A007 in the [facility] box to learn more about \"Learning About Low Back Pain. \"     If you do not have an account, please click on the \"Sign Up Now\" link. Current as of: September 20, 2018  Content Version: 12.1  © 9258-5820 [employer], [employer]. Care instructions adapted under license by [facility] ([facility]). If you have questions about a medical condition or this instruction, always ask your healthcare professional. [address] any warranty or liability for your use of this information.

## 2020-01-02 NOTE — PATIENT INSTRUCTIONS
Patient Education        Learning About Low Back Pain  What is low back pain? Low back pain is pain that can occur anywhere below the ribs and above the legs. It is very common. Almost everyone has it at one time or another. Low back pain can be:  Acute. This is new pain that can last a few days to a few weeks--at the most a few months. Chronic. This pain can last for more than a few months. Sometimes it can last for years. What are some myths about low back pain? Here are some common myths about low back pain--and the facts:  Myth: \"I need to rest my back when I have back pain. \"  Fact: Staying active won't hurt you. It may help you get better faster. Myth: \"I need prescription pain medicine. \"  Fact: It's best to try to let time and being active heal your back. Opioid pain medicines--such as hydrocodone or oxycodone--usually don't work any better than over-the-counter medicines like ibuprofen or naproxen. And opioids can cause serious problems like opioid use disorder or overdose. Moderate to severe opioid use disorder is sometimes called addiction. Myth: \"I need a test like an X-ray or an MRI to diagnose my low back pain. \"  Fact: Getting a test right away won't help you get better faster. And it could lead you down a treatment path you may not need, since most people get better on their own. What causes low back pain? In most cases, there isn't a clear cause. This can be frustrating, because your back hurts and there's no obvious reason. Your back pain can be caused by:  Overuse or muscle strain. This can happen from playing sports, lifting heavy things, or not being physically fit. A herniated disc. This is a problem with the cushion between the bones in your back. Arthritis. With age, you may have changes in your bones that can narrow the space around your nerves. Other causes. In rare cases, the cause is a serious illness like an infection or cancer.  But there are usually other symptoms too.  What are the symptoms? Your symptoms depend on your body and the cause of your back pain. You may feel:  · Pain that's sharp or dull. It may be in one small area or over a broad area. But even bad pain doesn't mean that it's caused by something serious. · Leg pain, numbness, or tingling. When a nerve gets squeezed--such as from a disc problem or arthritis--you may have symptoms in your leg or foot. You can even have leg symptoms from a back problem without having any pain in your back. How is low back pain diagnosed? A physical exam is the main way to diagnose low back pain. Your doctor may examine your back, check your nerves by testing your reflexes, and make sure that your muscles are strong. He or she also will ask questions about your back and overall health. Most people don't need any tests right away. Tests often don't show the reason for your pain. If your pain lasts more than 6 weeks or you have symptoms that your doctor is more concerned about, he or she may order tests. These may include an X-ray, a CT scan, or an MRI. In some cases, tests can help your doctor find a cause for your pain, especially for pain in one or both legs. How is low back pain treated? Most acute low back pain gets better on its own within a few weeks, no matter what the cause. Time and doing usual activities are all that most people need to feel better. Using heat or ice and taking over-the-counter pain medicine also can help while your body heals. If you aren't getting better on your own or your pain is very bad, your doctor may recommend:  · Physical therapy. · Spinal manipulation, such as by a chiropractor. · Acupuncture. · Massage. · Injections of steroid medicine in your back (especially for pain that involves your legs). If you have chronic low back pain, treatment will help you understand and manage your pain. Treatment may include:  · Staying active.  This may include walking or doing back exercises. · Physical therapy. · Medicines. Some of these medicines are also used for other problems, like depression. · Pain management. Your doctor may have you see a pain specialist.  · Counseling. Having chronic pain can be hard. It may help to talk to someone who can help you cope with your pain. Surgery isn't needed for most people. But it may help some types of low back pain. Follow-up care is a key part of your treatment and safety. Be sure to make and go to all appointments, and call your doctor if you are having problems. It's also a good idea to know your test results and keep a list of the medicines you take. When should you call for help? Call 911 anytime you think you may need emergency care. For example, call if:  · You can't move a leg at all. Call your doctor now or seek immediate medical care if:  · You have new or worse symptoms in your legs, belly, or buttocks. Symptoms may include:  ? Numbness or tingling. ? Weakness. ? Pain. · You lose bladder or bowel control. Watch closely for changes in your health, and be sure to contact your doctor if:  · Along with the back pain, you have a fever, lose weight, or don't feel well. · You do not get better as expected. Where can you learn more? Go to https://[employer]. org and sign in to your [employer] account. Enter A007 in the [facility] box to learn more about \"Learning About Low Back Pain. \"     If you do not have an account, please click on the \"Sign Up Now\" link. Current as of: September 20, 2018  Content Version: 12.1  © 7730-1492 Healthwise, Incorporated. Care instructions adapted under license by [facility] ([facility]). If you have questions about a medical condition or this instruction, always ask your healthcare professional. [address] any warranty or liability for your use of this information.

## 2020-01-23 ENCOUNTER — PATIENT MESSAGE (OUTPATIENT)
Dept: FAMILY MEDICINE CLINIC | Age: 65
End: 2020-01-23

## 2020-01-23 NOTE — TELEPHONE ENCOUNTER
From: Maria De Jesus Cantu  To: Jose Carlos Painting DO  Sent: 1/23/2020 10:35 AM EST  Subject: Prescription Question    I have a rash under my right breast from moisture building up. I was wondering if the dr could call in a powder to help clear it up. Thank you.

## 2020-01-24 ENCOUNTER — OFFICE VISIT (OUTPATIENT)
Dept: PHYSICAL MEDICINE AND REHAB | Age: 65
End: 2020-01-24
Payer: MEDICARE

## 2020-01-24 VITALS
BODY MASS INDEX: 39.68 KG/M2 | DIASTOLIC BLOOD PRESSURE: 80 MMHG | SYSTOLIC BLOOD PRESSURE: 124 MMHG | WEIGHT: 293 LBS | HEIGHT: 72 IN | HEART RATE: 72 BPM

## 2020-01-24 PROCEDURE — 99204 OFFICE O/P NEW MOD 45 MIN: CPT | Performed by: PHYSICAL MEDICINE & REHABILITATION

## 2020-01-24 RX ORDER — METHYLPREDNISOLONE 4 MG/1
TABLET ORAL
Qty: 1 KIT | Refills: 0 | Status: SHIPPED
Start: 2020-01-24 | End: 2020-03-10 | Stop reason: ALTCHOICE

## 2020-01-24 NOTE — PROGRESS NOTES
worker      Family History   Problem Relation Age of Onset    Diabetes Mother     Breast Cancer Mother     Other Father     Heart Disease Father        Allergies   Allergen Reactions    Enbrel [Etanercept]      Swelling    Humira [Adalimumab]      Swelling     Penicillins Swelling and Rash     n/v       Current Outpatient Medications   Medication Sig Dispense Refill    methylPREDNISolone (MEDROL DOSEPACK) 4 MG tablet Take by mouth as directed 1 kit 0    gabapentin (NEURONTIN) 300 MG capsule Week 1: take 1 cap nightly. Week 2: take 1 cap AM, 1 cap PM. Week 3: begin 1 cap three times daily 69 capsule 0    fluticasone (FLONASE) 50 MCG/ACT nasal spray 1 spray by Nasal route daily One spray each nostril daily 1 Bottle 1    Cholecalciferol 2000 units TABS Take 1 tablet by mouth daily 30 tablet 5    levothyroxine (SYNTHROID) 125 MCG tablet take 1 tablet by mouth once daily 30 tablet 5    omeprazole (PRILOSEC) 20 MG delayed release capsule take 1 capsule by mouth once daily 30 capsule 5    sertraline (ZOLOFT) 100 MG tablet take 1 tablet by mouth once daily 30 tablet 5    ORENCIA CLICKJECT 477 MG/ML SOAJ       ondansetron (ZOFRAN) 4 MG tablet Take 1 tablet by mouth 3 times daily as needed for Nausea or Vomiting 30 tablet 0    tears naturale II (CELLUGEL) SOLN ophthalmic solution Place 1 drop into the left eye as needed for Dry Eyes 1 Bottle 2    ibuprofen (ADVIL;MOTRIN) 800 MG tablet       methotrexate (RHEUMATREX) 2.5 MG chemo tablet       Multiple Vitamins-Minerals (MULTI-VITAMIN GUMMIES PO) Take 2 tablets by mouth daily       No current facility-administered medications for this visit. Review of Systems - For review of systems, positive symptoms are underlined and negative findings are not underlined. General: chills, fatigue, fever, malaise, night sweats, weight gain,  weight loss.  Psychological: anxiety, depression, suicidal ideation, sleep disturbances, behavioral disorder, difficulty concentrating, disorientation, hallucinations, mood swings, obsessive thoughts, physical abuse,  sexual abuse. Ophthalmic: blurry vision, decreased vision, double vision, loss of vision, photophobia, use of corrective device. Ear Nose Throat: hearing loss, tinnitus, phonophobia, sensitivity to smells, vertigo, or vocal changes. Allergy/Immunology: seasonal allergies, watery eyes, itchy eyes, frequent infections. Hematological and Lymphatic: bleeding problems, blood clots, bruising,  yellowing of the skin, swollen lymph nodes. Endocrine:  polydypsia, polyuria, temperature intolerance. Respiratory: cough, shortness of breath, wheezing. Cardiovascular: syncope, chest pain, dyspnea on exertion, edema, irregular heartbeat,  palpitations. Gastrointestinal: abdominal pain, constipation, diarrhea,  decreased appetite, heartburn, hematemesis, melena, nausea, vomiting, stool incontinence, abnormal swallowing. Genito-Urinary: dysuria, hematuria, incontinence, frequency, urgency. Musculoskeletal: joint pain, stiffness, swelling, muscle pain, muscle  tenderness. Neurological: confusion, memory loss, dizziness, gait disturbance, headaches, impaired coordination, decreased balance, numbness/tingling, seizures, speech problems, tremors,weakness. Dermatological:  hair changes, nail changes, pruritus, rash. Physical Exam: Blood pressure 124/80, pulse 72, height 6' (1.829 m), weight (!) 350 lb 6.4 oz (158.9 kg). General: The patient is in no apparent distress. Body habitus is obese. HEENT: No rhinorrhea, sneezing, yawning, or lacrimation. No scleral icterus or conjunctival injection. SKIN: No piloerection. No track marks. No rash. Normal turgor. No erythema or ecchymosis. Psychological: Mood and affect are appropriate. Hygiene is appropriate. Cardiovascular:  Heart is regular rate and rhythm. Peripheral pulses are 2+ at the dorsalis pedis, posterior tibial and radial arteries. There is no edema.  Respiratory: Respirations are regular and unlabored. There is no cyanosis. Lymphatic: There is no cervical or inguinal lymphadenopathy. Gastrointestinal: Soft abdomen, non-tender. No pulsating abdominal mass. Genitourinary: No costovertebral angle tenderness. MSK: Lumbar:  Cervical lordosis normal,  thoracic kyphosis normal and lumbar lordosis normal.No hairy patch, cafe au lait, nevi, hemangioma or dimpling over lumbar area. Pelvis level, no scoliosis, leg length equal. Seated and standing flexion tests negative. There is no step off deformity. No superficial or bony tenderness. Lumbar AROM in flexion is 30 degrees, in extension is 20 degrees, in left rotation is 20degrees, in right rotation is 20 degrees, in left lateral flexion is 20 degrees and in right lateral flexion is 20 degrees. There is tenderness to palpation at bilateral lumbar paraspinals. No tenderness to palpation at  bilateral SIJ,  gluteal muscles,  pubic tubercle, PSIS,  ischial tuberosity,  greater trochanter,  ASIS,  iliac crest.  No trigger points. No spasm. No edema, erythema, ecchymosis,  mass or deformity. Taut bands absent. Popliteal angle is normal. Seated straight leg raise positive on left. SIJ: Gillet negative bilaterally. KHALIF negative bilaterally. ASIS compression test negative bilaterally. . Hip: Full painless AROM bilaterally. Km negative bilaterally. Trendelenburg negative bilaterally. Neurologic: Awake, alert and oriented in three planes. Speech is fluent. No facial weakness. Tongue is midline. Hearing is intact for conversation. Pupils are equal and round. Extraocular muscles are intact. Hearing is intact for conversation. Shoulder shrug symmetric. Sensation: Diminished left lateral leg to light touch, otherwise intact for light touch and pin prick in all upper and lower extremity dermatomes. Vibration and proprioception are intact at the bilateral first MTP.   Strength: 2/5 left ankle eversion, 2/5 left ankle dorsiflexion, 4/5 left knee extension, otherwise, 5/5 in all myotomes in the upper and lower extremities. Muscle Tendon Reflexes: 2+ symmetric in the bilateral upper and lower extremities. Babinski is downgoing bilaterally. Salvadore Risen is negative bilaterally. Gait is Drop-foot on left. Romberg is negative. Heel and toe walk are unable. Tandem walk is unable  No clonus or spasticity. The patient was able to rise from a chair and squat without difficulty. There is no tremor. Impression:   1. Lumbosacral radiculopathy at L5        Plan:   Orders Placed This Encounter   Procedures    MRI Lumbar Spine WO Contrast     Standing Status:   Future     Standing Expiration Date:   1/24/2021     Order Specific Question:   Reason for exam:     Answer:   with IV sedation   [facility]     Referral Priority:   Routine     Referral Type:   Eval and Treat     Referral Reason:   Specialty Services Required     Requested Specialty:   Physical Therapy     Number of Visits Requested:   1    EMG     Order Specific Question:   Which body part? Answer:   bilateral lower extremities re left L5 radiculopathy     Orders Placed This Encounter   Medications    methylPREDNISolone (MEDROL DOSEPACK) 4 MG tablet     Sig: Take by mouth as directed     Dispense:  1 kit     Refill:  0      Recommended the patient use a cane. Will consider AFO pending response to treatment.  The patient was educated about the diagnosis, prognosis, indications, risks and benefits of treatment. An opportunity to ask questions was given to the patient and questions were answered. The patient agreed to proceed with the recommended treatment as described above.  Follow up at EMG     Thank you for the consultation and for allowing me to participate in the care of this patient. Sincerely,         Norma Chen D.O., P.T.   Board Certified Physical Medicine and Rehabilitation  Board Certified Electrodiagnostic Medicine

## 2020-01-26 RX ORDER — NYSTATIN 100000 [USP'U]/G
POWDER TOPICAL
Qty: 1 BOTTLE | Refills: 0 | Status: SHIPPED
Start: 2020-01-26 | End: 2020-04-29

## 2020-01-29 ENCOUNTER — OFFICE VISIT (OUTPATIENT)
Dept: PHYSICAL MEDICINE AND REHAB | Age: 65
End: 2020-01-29
Payer: MEDICARE

## 2020-01-29 VITALS — HEIGHT: 72 IN | WEIGHT: 293 LBS | BODY MASS INDEX: 39.68 KG/M2

## 2020-01-29 PROCEDURE — 95909 NRV CNDJ TST 5-6 STUDIES: CPT | Performed by: PHYSICAL MEDICINE & REHABILITATION

## 2020-01-29 PROCEDURE — 95886 MUSC TEST DONE W/N TEST COMP: CPT | Performed by: PHYSICAL MEDICINE & REHABILITATION

## 2020-01-29 NOTE — PROGRESS NOTES
[facility]  Electrodiagnostic Laboratory  *Accredited by the [address] [place] [address] with exemplary status  [address] [facility] [place]  Phone: [phone]  Fax: [phone]    Referring Provider: Leena Ramirez DO  Primary Care Physician: Yfn Jovel DO  Patient Name: Von Grififn  Patient YOB: 1955  Gender: female  BMI: Body mass index is 47.47 kg/m². Height 6' (1.829 m), weight (!) 350 lb (158.8 kg). 1/29/2020    Description of clinical problem:   Chief Complaint   Patient presents with    Lower Back Pain     Low back pain with radiation to the left lower extremity to foot     Numbness     Left foot numbness     Extremity Weakness     Left lower extremity weakness      Pain Yes  Pain Score:  10 - Worst pain ever worse with ambulation; Numbness/tingling  Yes; Weakness  Yes       Sensory NCS      Nerve / Sites Rec. Site Peak Lat PP Amp Segments Distance Velocity Temp. ms µV  cm m/s °C   L Sural - Ankle (Calf)      Calf Ankle 4.64 6.3 Calf - Ankle 14 45 32   L Superficial peroneal - Ankle      Lat leg Ankle 3.39 10.2 Lat leg - Ankle 10 43 33.2       Motor NCS      Nerve / Sites Muscle Onset Amplitude Segments Distance Velocity Temp.     ms mV  cm m/s °C   L Peroneal - EDB      Ankle EDB 5.00 2.3 Ankle - EDB 8  32.3      Fib head EDB 13.96 1.7 Fib head - Ankle 36 40 32.3      Above Knee EDB 16.41 1.7 Above Knee - Fib head 10 41 32.3   L Tibial - AH      Ankle AH 4.43 5.5 Ankle - AH 8  33.5      Pop fossa AH 14.38 0.3 Pop fossa - Ankle 55 55 33.5       L Tibial - AH: UNABLE TO TOLERATE POST KNEE STIM      F Wave      Nerve Fmin % F    ms %   L Peroneal - EDB 45.26 90   L Tibial - AH 63.80* 10       H Reflex      Nerve H Lat    ms   L Tibial - Soleus 33.28   R Tibial - Soleus 33.65       EMG      EMG Summary Table     Spontaneous MUAP Recruitment   Muscle Nerve Roots IA Fib PSW Fasc Amp Dur. PPP Pattern   L.  Vastus medialis Femoral L2-L4 N None None None N N N N   L. Tibialis anterior Deep peroneal (Fibular) L4-L5 N 2+ 2+ None N N N Reduced   L. Gastrocnemius (Medial head) Tibial S1-S2 N None None None N N N N   L. Extensor hallucis longus Deep peroneal (Fibular) L5-S1 N 2+ 2+ None N N N Discrete   L. Gluteus medius Superior gluteal L4-S1 N None None None N N N Reduced   L. Gluteus phuong Inferior gluteal L5-S2 N None None None N N N N   L. Lumbar paraspinals (low)  - N 2+ 2+ None N N N N   L. Lumbar paraspinals (mid)  - N None None None N N N N       Study Limitations:  Obesity, patient tolerance    Summary of Findings:   Nerve conduction studies:   · The following nerve conduction studies were abnormal:   · The left tibial minimal F wave was prolonged. · All other nerve conduction studies listed in the table above were normal in latency, amplitude and conduction velocity. Needle EMG:   · Needle EMG was performed using a concentric needle. · The following abnormalities were seen on needle EMG: positive sharp waves, fibrillation potentials and decreased motor unit recruitment was present in the left L5 myotome as listed in the table. All other muscles tested, as listed in the table above demonstrated normal amplitude, duration, phases and recruitment and no active denervation signs were seen. Diagnostic Interpretation: This study was abnormal.     Electrodiagnosis: There is electrodiagnostic evidence of a L5 radiculopathy. · Location: left    · Nature: [  X] Axonal   [  ] Demyelinating  [  ] Mixed axonal and demyelinating     [  ] Sensory [ X ] Motor               [  ] Mixed sensorimotor     [ X ] with active denervation       [  ] without active denervation  · Duration: Subacute  · Severity: moderate  · Prognosis: Poor. The prognosis for recovery of axonal lesions is poor and dependant on collateral sprouting and reinnervation. Previous Study: None      Follow up EMG is recommended if clinically warranted only.      Technologist: Sabrina Yen

## 2020-01-30 RX ORDER — LORAZEPAM 2 MG/1
TABLET ORAL
Qty: 1 TABLET | Refills: 0 | Status: SHIPPED | OUTPATIENT
Start: 2020-01-30 | End: 2020-02-29

## 2020-02-05 ENCOUNTER — HOSPITAL ENCOUNTER (OUTPATIENT)
Dept: MAMMOGRAPHY | Age: 65
Discharge: HOME | End: 2020-02-07
Payer: MEDICARE

## 2020-02-05 PROCEDURE — 77063 BREAST TOMOSYNTHESIS BI: CPT

## 2020-02-13 ENCOUNTER — EVALUATION (OUTPATIENT)
Dept: PHYSICAL THERAPY | Age: 65
End: 2020-02-13
Payer: MEDICARE

## 2020-02-13 PROBLEM — M54.17 LUMBOSACRAL RADICULOPATHY AT L5: Status: ACTIVE | Noted: 2020-02-13

## 2020-02-13 PROCEDURE — 97110 THERAPEUTIC EXERCISES: CPT | Performed by: PHYSICAL THERAPIST

## 2020-02-13 PROCEDURE — 97162 PT EVAL MOD COMPLEX 30 MIN: CPT | Performed by: PHYSICAL THERAPIST

## 2020-02-13 NOTE — PROGRESS NOTES
Physical Therapy Treatment Note    Date: 2020  Patient Name: El Mohamud  : 1955   MRN: [id number]  DOInjury: 2019  DOSx: None  Referring Provider: Vi Sparrow, [address] [place] [address] [place] [address], [facility] Diagnosis:    Diagnosis Orders   1. Lumbosacral radiculopathy at L5         Outcome Measure:  Modified Oswestry 58% disability    S: see eval  O:  Time 4054-2184     Visit 1 Repeat outcome measure at mid point and end. Pain 8/10     ROM      Modalities      MH + ES            Exercise      ALL EXERCISE DONE WITH DRAW-IN TECHNIQUE                            Functional activities To aid in reaching , pushing, pulling tasks at home     ROWS: H  \"    ROWS: M  \"    ROWS: L  \"    Obliques - high  \"    Obliques - low  \"     THEREX     Supine Knee to Chest 5 reps x 1 set with 10 second holds     Sitting HS Stretch 5 reps x 1 set with 10 second holds      LTR 10 reps x 1 set with 3 second holds      Pelvic Tilts 10 reps x 1 set     Bridges      Supine Clams 10 reps x 1 set      Piriformis Stretch            Nustep        Punches      Lat pulldowns      Triceps ext standing      Marching            Trunk ext TB      Trunk flex TB      Hip abd      Hip EXT      TG Squats                  A:  Tolerated well. Above added to written HEP.   P: Continue with rehab plan  Maqruez Tai PT    Treatment Charges: Mins Units   Initial Evaluation 30 1   Re-Evaluation     Ther Exercise         TE 25 2   Manual Therapy     MT     Ther Activities        TA     Gait Training          GT     Neuro Re-education NR     Modalities     Non-Billable Service Time     Other     Total Time/Units 55 3

## 2020-02-13 NOTE — PROGRESS NOTES
 LORazepam (ATIVAN) 2 MG tablet Take one tablet one hour before MRI. Do not drive after taking 1 tablet 0    nystatin (MYCOSTATIN) 963565 UNIT/GM powder Apply 3 times daily. 1 Bottle 0    methylPREDNISolone (MEDROL DOSEPACK) 4 MG tablet Take by mouth as directed 1 kit 0    gabapentin (NEURONTIN) 300 MG capsule Week 1: take 1 cap nightly. Week 2: take 1 cap AM, 1 cap PM. Week 3: begin 1 cap three times daily 69 capsule 0    fluticasone (FLONASE) 50 MCG/ACT nasal spray 1 spray by Nasal route daily One spray each nostril daily 1 Bottle 1    Cholecalciferol 2000 units TABS Take 1 tablet by mouth daily 30 tablet 5    levothyroxine (SYNTHROID) 125 MCG tablet take 1 tablet by mouth once daily 30 tablet 5    omeprazole (PRILOSEC) 20 MG delayed release capsule take 1 capsule by mouth once daily 30 capsule 5    sertraline (ZOLOFT) 100 MG tablet take 1 tablet by mouth once daily 30 tablet 5    ORENCIA CLICKJECT 166 MG/ML SOAJ       ondansetron (ZOFRAN) 4 MG tablet Take 1 tablet by mouth 3 times daily as needed for Nausea or Vomiting 30 tablet 0    tears naturale II (CELLUGEL) SOLN ophthalmic solution Place 1 drop into the left eye as needed for Dry Eyes 1 Bottle 2    ibuprofen (ADVIL;MOTRIN) 800 MG tablet       methotrexate (RHEUMATREX) 2.5 MG chemo tablet       Multiple Vitamins-Minerals (MULTI-VITAMIN GUMMIES PO) Take 2 tablets by mouth daily       No current facility-administered medications for this visit. Occupation: retired. Physical demands include: NA.  Status: NA    Exercise regimen: none    Hobbies: yard work, working around the house    Patient Goals: pain relief, return to prior activity, full use of arm, get back to normal    Contraindications/Precautions: none    OBJECTIVE:     Observations: well nourished female    Inspection: normal orthopedic exam         Gait: altered with short step length, width, height    Functional Strength:   Able to toe walk, heel walk, and squat.     Range of 0-3/10   Increase ROM to WNL   Increase Strength to 5/5 trunk, 4/5 B hips, 3+/5 L ankle   Able to perform/complete the following functions/tasks: Perform ADLs, ambulation, hobbies, and housework with less pain.  Oswestry Low Back Disability Questionnaire 0-25% disability    Independent with Home Exercise Programs    Rehab Potential: [x] Good  [] Fair  [] Poor    PLAN       Treatment Plan:   [x] Therapeutic Exercise  [x] Therapeutic Activity  [x] Neuromuscular Re-education   [x] Gait Training  [x] Balance Training  [] Aerobic conditioning  [x] Manual Therapy  [x] Massage/Fascial release   [] Work/Sport specific activities    [] Pain Neuroscience [x] Cold/hotpack  [] Vasocompression  [x] Electrical Stimulation  [x] Lumbar/Cervical Traction  [] Ultrasound   [] Iontophoresis: 4 mg/mL Dexamethasone Sodium Phosphate 40-80 mAmin        [x] Instruction in HEP      []  Medication allergies reviewed for use of Dexamethasone Sodium Phosphate 4mg/ml  with iontophoresis treatments. Patient is not allergic. The following CPT codes are likely to be used in the care of this patient: 25717 PT Evaluation: Moderate Complexity , 88028 PT Re-Evaluation , 99084 Therapeutic Exercise , 51879 Neuromuscular Re-Education , 37169 Therapeutic Activities , 55342 Manual Therapy , 79400 Mechanical Traction , 96993 Gait Training ,  Electrical Stimulation      Suggested Professional Referral: [x] No  [] Yes:     Patient Education:  [x] Plans/Goals, Risks/Benefits discussed  [x] Home exercise program  Method of Education: [x] Verbal  [x] Demo  [x] Written  Comprehension of Education:  [x] Verbalizes understanding. [x] Demonstrates understanding. [] Needs Review. [] Demonstrates/verbalizes understanding of HEP/Ed previously given.     Frequency:  2-3 days per week for 4-6 weeks    Patient understands diagnosis/prognosis and consents to treatment, plan and goals: [x] Yes    [] No     Thank you for the opportunity to work with your

## 2020-02-17 ENCOUNTER — PATIENT MESSAGE (OUTPATIENT)
Dept: FAMILY MEDICINE CLINIC | Age: 65
End: 2020-02-17

## 2020-02-18 NOTE — TELEPHONE ENCOUNTER
From: Jaclyn Han  To: Parisa Rivera DO  Sent: 2/17/2020 3:55 PM EST  Subject: Test Results Question    I had a mammogram on the 5th of February Today is the 17th I still have not received a letter with the results. Is there a problem ? ?  Lyndsey Chao

## 2020-03-03 LAB
ALBUMIN SERPL-MCNC: 4.6 G/DL
ALP BLD-CCNC: 112 U/L
ALT SERPL-CCNC: 37 U/L
ANION GAP SERPL CALCULATED.3IONS-SCNC: NORMAL MMOL/L
AST SERPL-CCNC: 32 U/L
BASOPHILS ABSOLUTE: 20 /ΜL
BASOPHILS RELATIVE PERCENT: 0.4 %
BILIRUB SERPL-MCNC: 0.8 MG/DL (ref 0.1–1.4)
BUN BLDV-MCNC: NORMAL MG/DL
CALCIUM SERPL-MCNC: 9.3 MG/DL
CHLORIDE BLD-SCNC: 105 MMOL/L
CO2: 26 MMOL/L
CREAT SERPL-MCNC: 0.73 MG/DL
EOSINOPHILS ABSOLUTE: 82 /ΜL
EOSINOPHILS RELATIVE PERCENT: 1.6 %
GFR CALCULATED: 86
GLUCOSE BLD-MCNC: 121 MG/DL
HCT VFR BLD CALC: 42.1 % (ref 36–46)
HEMOGLOBIN: 14.3 G/DL (ref 12–16)
LYMPHOCYTES ABSOLUTE: 469 /ΜL
LYMPHOCYTES RELATIVE PERCENT: 9.2 %
MCH RBC QN AUTO: 29.4 PG
MCHC RBC AUTO-ENTMCNC: 34 G/DL
MCV RBC AUTO: 86.6 FL
MONOCYTES ABSOLUTE: 352 /ΜL
MONOCYTES RELATIVE PERCENT: 6.9 %
NEUTROPHILS ABSOLUTE: 4177 /ΜL
NEUTROPHILS RELATIVE PERCENT: 81.9 %
PDW BLD-RTO: 13.9 %
PLATELET # BLD: 186 K/ΜL
PMV BLD AUTO: 10.1 FL
POTASSIUM SERPL-SCNC: 3.8 MMOL/L
RBC # BLD: 4.86 10^6/ΜL
SODIUM BLD-SCNC: 141 MMOL/L
TOTAL PROTEIN: 7.7
WBC # BLD: 5.1 10^3/ML

## 2020-03-06 ENCOUNTER — TELEPHONE (OUTPATIENT)
Dept: PHYSICAL MEDICINE AND REHAB | Age: 65
End: 2020-03-06

## 2020-03-06 NOTE — TELEPHONE ENCOUNTER
----- Message from [employer], DO sent at 3/6/2020  4:15 PM EST -----  Test result was abnormal.  Please schedule follow up to discuss results and determine treatment plan.

## 2020-03-06 NOTE — TELEPHONE ENCOUNTER
Called and spoke with the patient to make her an appointment to go over MRI results. Patient is scheduled on 3-.

## 2020-03-10 ENCOUNTER — OFFICE VISIT (OUTPATIENT)
Dept: PHYSICAL MEDICINE AND REHAB | Age: 65
End: 2020-03-10
Payer: MEDICARE

## 2020-03-10 VITALS
WEIGHT: 293 LBS | HEIGHT: 72 IN | DIASTOLIC BLOOD PRESSURE: 78 MMHG | SYSTOLIC BLOOD PRESSURE: 138 MMHG | BODY MASS INDEX: 39.68 KG/M2 | HEART RATE: 90 BPM

## 2020-03-10 PROCEDURE — 99215 OFFICE O/P EST HI 40 MIN: CPT | Performed by: PHYSICAL MEDICINE & REHABILITATION

## 2020-03-10 RX ORDER — DULOXETIN HYDROCHLORIDE 20 MG/1
CAPSULE, DELAYED RELEASE ORAL
Qty: 18 CAPSULE | Refills: 0 | Status: SHIPPED
Start: 2020-03-10 | End: 2020-03-27 | Stop reason: ALTCHOICE

## 2020-03-10 RX ORDER — DULOXETIN HYDROCHLORIDE 60 MG/1
60 CAPSULE, DELAYED RELEASE ORAL DAILY
Qty: 30 CAPSULE | Refills: 0 | Status: SHIPPED
Start: 2020-03-10 | End: 2020-03-27 | Stop reason: ALTCHOICE

## 2020-03-10 RX ORDER — LEVOTHYROXINE SODIUM 0.12 MG/1
TABLET ORAL
COMMUNITY
Start: 2019-08-20 | End: 2020-04-21 | Stop reason: SDUPTHER

## 2020-03-10 RX ORDER — OMEPRAZOLE 20 MG/1
CAPSULE, DELAYED RELEASE ORAL
COMMUNITY
Start: 2019-04-23 | End: 2020-04-21 | Stop reason: SDUPTHER

## 2020-03-10 RX ORDER — FOLIC ACID 1 MG/1
TABLET ORAL
COMMUNITY
Start: 2020-02-17 | End: 2020-04-21 | Stop reason: SDUPTHER

## 2020-03-10 RX ORDER — SERTRALINE HYDROCHLORIDE 100 MG/1
TABLET, FILM COATED ORAL
COMMUNITY
Start: 2019-08-20 | End: 2020-04-21 | Stop reason: SDUPTHER

## 2020-03-10 RX ORDER — FLUTICASONE PROPIONATE 50 MCG
1 SPRAY, SUSPENSION (ML) NASAL PRN
COMMUNITY
Start: 2019-09-18 | End: 2020-06-30

## 2020-03-10 NOTE — PROGRESS NOTES
equal. Seated and standing flexion tests negative. There is no step off deformity. No superficial or bony tenderness. Lumbar AROM in flexion is 30 degrees, in extension is 20 degrees, in left rotation is 20degrees, in right rotation is 20 degrees, in left lateral flexion is 20 degrees and in right lateral flexion is 20 degrees. There is tenderness to palpation at bilateral lumbar paraspinals. No tenderness to palpation at  bilateral SIJ,  gluteal muscles,  pubic tubercle, PSIS,  ischial tuberosity,  greater trochanter,  ASIS,  iliac crest.  No trigger points. No spasm. No edema, erythema, ecchymosis,  mass or deformity. Taut bands absent. Popliteal angle is normal. Seated straight leg raise positive on left. SIJ: Gillet negative bilaterally. KHALIF negative bilaterally. ASIS compression test negative bilaterally. . Hip: Full painless AROM bilaterally. Km negative bilaterally. Trendelenburg negative bilaterally. Neurologic: Awake, alert and oriented in three planes. Speech is fluent. No facial weakness. Tongue is midline. Hearing is intact for conversation. Pupils are equal and round. Extraocular muscles are intact. Hearing is intact for conversation. Shoulder shrug symmetric. Sensation: Diminished left lateral leg to light touch, otherwise intact for light touch and pin prick in all upper and lower extremity dermatomes. Vibration and proprioception are intact at the bilateral first MTP. Strength: 2/5 left ankle eversion, 2/5 left ankle dorsiflexion, 4/5 left knee extension, otherwise, 5/5 in all myotomes in the upper and lower extremities. Muscle Tendon Reflexes: 2+ symmetric in the bilateral upper and lower extremities. Babinski is downgoing bilaterally. Jacquenette Gemma is negative bilaterally. Gait is Drop-foot on left. Romberg is negative. Heel and toe walk are unable. Tandem walk is unable  No clonus or spasticity. The patient was able to rise from a chair and squat without difficulty.  There is no tremor. Impression:   1. Lumbosacral radiculopathy at L5    2. Herniated lumbar intervertebral disc    3. Chronic bilateral low back pain with left-sided sciatica    4. Lumbar spondylosis    5. Obesity, morbid, BMI 40.0-49.9 ([address]        Plan:  Orders Placed This Encounter   Procedures   Keven Fraga [address]     Referral Priority:   Routine     Referral Type:   Home Health Care     Referral Reason:   Specialty Services Required     Number of Visits Requested:   1    Ambulatory epidural steroid injection     Scheduling Instructions:      Left L5 Transforaminal Epidural steroid injection by . Dr. Kasie Barcenas will update H&P if I haven't seen them in the last 30 days at the time of the injection. Orders Placed This Encounter   Medications    DULoxetine (CYMBALTA) 20 MG extended release capsule     Sig: Take 1tab daily for 3d then 2 tab daily for 3 days then 3 tab daily X3d then call for new dose. Dispense:  18 capsule     Refill:  0    DULoxetine (CYMBALTA) 60 MG extended release capsule     Sig: Take 1 capsule by mouth daily Start after titration with 20 mg complete     Dispense:  30 capsule     Refill:  0       Medications Discontinued During This Encounter   Medication Reason    levothyroxine (SYNTHROID) 125 MCG tablet     fluticasone (FLONASE) 50 MCG/ACT nasal spray     omeprazole (PRILOSEC) 20 MG delayed release capsule     sertraline (ZOLOFT) 100 MG tablet     Cholecalciferol 2000 units TABS     gabapentin (NEURONTIN) 300 MG capsule Alternate therapy    methylPREDNISolone (MEDROL DOSEPACK) 4 MG tablet Therapy completed     Weight loss   The patient was educated about the diagnosis, prognosis, indications, risks and benefits of treatment. An opportunity to ask questions was given to the patient and questions were answered. The patient agreed to proceed with the recommended treatment as described above.      Follow up after RANI     Thank you for allowing me to participate in the care of your patient. Zhen Urbina D.O., P.T.   Board Certified Physical Medicine and Rehabilitation  Board Certified Electrodiagnostic Medicine

## 2020-03-13 ENCOUNTER — TELEPHONE (OUTPATIENT)
Dept: PHYSICAL MEDICINE AND REHAB | Age: 65
End: 2020-03-13

## 2020-03-13 ENCOUNTER — PATIENT MESSAGE (OUTPATIENT)
Dept: PHYSICAL MEDICINE AND REHAB | Age: 65
End: 2020-03-13

## 2020-03-13 NOTE — TELEPHONE ENCOUNTER
Spoke with patient and set up epidural for 03/19/2020. Instructed patient to hold ibuprofen for 24 prior. Patient verbalized understanding.

## 2020-03-18 ENCOUNTER — PREP FOR PROCEDURE (OUTPATIENT)
Dept: PHYSICAL MEDICINE AND REHAB | Age: 65
End: 2020-03-18

## 2020-03-18 ENCOUNTER — TELEPHONE (OUTPATIENT)
Dept: PHYSICAL MEDICINE AND REHAB | Age: 65
End: 2020-03-18

## 2020-03-18 NOTE — TELEPHONE ENCOUNTER
Sid Coelho with [facility] PT called to let us know that the patient wants to hold off on starting PT until next week because the patient is currently sick.

## 2020-03-19 RX ORDER — DULOXETIN HYDROCHLORIDE 20 MG/1
CAPSULE, DELAYED RELEASE ORAL
Qty: 18 CAPSULE | Refills: 0 | OUTPATIENT
Start: 2020-03-19

## 2020-03-27 ENCOUNTER — VIRTUAL VISIT (OUTPATIENT)
Dept: PHYSICAL MEDICINE AND REHAB | Age: 65
End: 2020-03-27
Payer: MEDICARE

## 2020-03-27 PROCEDURE — G2012 BRIEF CHECK IN BY MD/QHP: HCPCS | Performed by: PHYSICAL MEDICINE & REHABILITATION

## 2020-03-27 RX ORDER — TOPIRAMATE 25 MG/1
TABLET ORAL
Qty: 60 TABLET | Refills: 0 | Status: SHIPPED
Start: 2020-03-27 | End: 2020-04-21

## 2020-03-27 RX ORDER — CELECOXIB 100 MG/1
100 CAPSULE ORAL DAILY
Qty: 60 CAPSULE | Refills: 2 | Status: SHIPPED
Start: 2020-03-27 | End: 2020-04-29

## 2020-03-27 NOTE — PROGRESS NOTES
this call serves to triage the patient into an appointment for the relevant concern      Cele Plummer

## 2020-04-07 ENCOUNTER — TELEPHONE (OUTPATIENT)
Dept: PHYSICAL MEDICINE AND REHAB | Age: 65
End: 2020-04-07

## 2020-04-08 ENCOUNTER — PATIENT MESSAGE (OUTPATIENT)
Dept: PHYSICAL MEDICINE AND REHAB | Age: 65
End: 2020-04-08

## 2020-04-08 RX ORDER — ONDANSETRON 4 MG/1
4 TABLET, ORALLY DISINTEGRATING ORAL 3 TIMES DAILY PRN
Qty: 21 TABLET | Refills: 0 | Status: SHIPPED
Start: 2020-04-08 | End: 2020-06-30

## 2020-04-08 NOTE — TELEPHONE ENCOUNTER
From: Kory Grider  To: Romero Rm DO  Sent: 4/8/2020 1:31 PM EDT  Subject: Prescription Question    This prob isn't your area but I am so nauseated. I'm  Not sure if it's because of the pills I'm taking or what. Can you call me in zofran for nausea. Thank you.  Gera Preciado

## 2020-04-15 RX ORDER — DULOXETIN HYDROCHLORIDE 60 MG/1
CAPSULE, DELAYED RELEASE ORAL
Qty: 30 CAPSULE | Refills: 0 | OUTPATIENT
Start: 2020-04-15

## 2020-04-21 RX ORDER — TOPIRAMATE 25 MG/1
TABLET ORAL
Qty: 60 TABLET | Refills: 1 | Status: SHIPPED
Start: 2020-04-21 | End: 2020-05-20

## 2020-04-21 RX ORDER — SERTRALINE HYDROCHLORIDE 100 MG/1
TABLET, FILM COATED ORAL
Qty: 30 TABLET | OUTPATIENT
Start: 2020-04-21

## 2020-04-21 RX ORDER — OMEPRAZOLE 20 MG/1
CAPSULE, DELAYED RELEASE ORAL
Qty: 30 CAPSULE | OUTPATIENT
Start: 2020-04-21

## 2020-04-21 RX ORDER — OMEPRAZOLE 20 MG/1
CAPSULE, DELAYED RELEASE ORAL
Qty: 30 CAPSULE | Refills: 0 | Status: SHIPPED
Start: 2020-04-21 | End: 2020-05-18

## 2020-04-21 RX ORDER — LEVOTHYROXINE SODIUM 0.12 MG/1
TABLET ORAL
Qty: 30 TABLET | OUTPATIENT
Start: 2020-04-21

## 2020-04-21 RX ORDER — SERTRALINE HYDROCHLORIDE 100 MG/1
TABLET, FILM COATED ORAL
Qty: 30 TABLET | Refills: 0 | Status: SHIPPED
Start: 2020-04-21 | End: 2020-05-18

## 2020-04-21 RX ORDER — LEVOTHYROXINE SODIUM 0.12 MG/1
TABLET ORAL
Qty: 30 TABLET | Refills: 0 | Status: SHIPPED
Start: 2020-04-21 | End: 2020-05-18

## 2020-04-21 RX ORDER — FOLIC ACID 1 MG/1
TABLET ORAL
Qty: 30 TABLET | Refills: 0 | Status: SHIPPED
Start: 2020-04-21 | End: 2020-06-16 | Stop reason: SDUPTHER

## 2020-04-21 NOTE — TELEPHONE ENCOUNTER
Patient was to f/u around  4/10/2020 for six month f/u and lab review. Please reschedule the apt and remind her to the the lab work done as was planned. One month worth of refills signed.

## 2020-04-23 ENCOUNTER — TELEPHONE (OUTPATIENT)
Dept: PHYSICAL MEDICINE AND REHAB | Age: 65
End: 2020-04-23

## 2020-05-01 ENCOUNTER — HOSPITAL ENCOUNTER (OUTPATIENT)
Age: 65
Discharge: HOME | End: 2020-05-03
Payer: MEDICARE

## 2020-05-01 PROCEDURE — U0003 INFECTIOUS AGENT DETECTION BY NUCLEIC ACID (DNA OR RNA); SEVERE ACUTE RESPIRATORY SYNDROME CORONAVIRUS 2 (SARS-COV-2) (CORONAVIRUS DISEASE [COVID-19]), AMPLIFIED PROBE TECHNIQUE, MAKING USE OF HIGH THROUGHPUT TECHNOLOGIES AS DESCRIBED BY CMS-2020-01-R: HCPCS

## 2020-05-03 LAB
SARS-COV-2: NOT DETECTED
SOURCE: NORMAL

## 2020-05-04 ENCOUNTER — PREP FOR PROCEDURE (OUTPATIENT)
Dept: PHYSICAL MEDICINE AND REHAB | Age: 65
End: 2020-05-04

## 2020-05-05 ENCOUNTER — HOSPITAL ENCOUNTER (OUTPATIENT)
Dept: OPERATING ROOM | Age: 65
Setting detail: OUTPATIENT SURGERY
Discharge: HOME | End: 2020-05-05
Attending: PHYSICAL MEDICINE & REHABILITATION
Payer: MEDICARE

## 2020-05-05 ENCOUNTER — HOSPITAL ENCOUNTER (OUTPATIENT)
Age: 65
Setting detail: OUTPATIENT SURGERY
Discharge: HOME | End: 2020-05-05
Attending: PHYSICAL MEDICINE & REHABILITATION | Admitting: PHYSICAL MEDICINE & REHABILITATION
Payer: MEDICARE

## 2020-05-05 VITALS
RESPIRATION RATE: 18 BRPM | DIASTOLIC BLOOD PRESSURE: 54 MMHG | SYSTOLIC BLOOD PRESSURE: 136 MMHG | HEART RATE: 71 BPM | OXYGEN SATURATION: 96 % | TEMPERATURE: 97.9 F

## 2020-05-05 PROCEDURE — 3600000005 HC SURGERY LEVEL 5 BASE: Performed by: PHYSICAL MEDICINE & REHABILITATION

## 2020-05-05 PROCEDURE — 7100000010 HC PHASE II RECOVERY - FIRST 15 MIN: Performed by: PHYSICAL MEDICINE & REHABILITATION

## 2020-05-05 PROCEDURE — 6360000002 HC RX W HCPCS: Performed by: PHYSICAL MEDICINE & REHABILITATION

## 2020-05-05 PROCEDURE — 2580000003 HC RX 258: Performed by: PHYSICAL MEDICINE & REHABILITATION

## 2020-05-05 PROCEDURE — 7100000011 HC PHASE II RECOVERY - ADDTL 15 MIN: Performed by: PHYSICAL MEDICINE & REHABILITATION

## 2020-05-05 PROCEDURE — 6360000004 HC RX CONTRAST MEDICATION: Performed by: PHYSICAL MEDICINE & REHABILITATION

## 2020-05-05 PROCEDURE — 2709999900 HC NON-CHARGEABLE SUPPLY: Performed by: PHYSICAL MEDICINE & REHABILITATION

## 2020-05-05 PROCEDURE — 64483 NJX AA&/STRD TFRM EPI L/S 1: CPT | Performed by: PHYSICAL MEDICINE & REHABILITATION

## 2020-05-05 PROCEDURE — 2500000003 HC RX 250 WO HCPCS: Performed by: PHYSICAL MEDICINE & REHABILITATION

## 2020-05-05 PROCEDURE — 3209999900 FLUORO FOR SURGICAL PROCEDURES

## 2020-05-05 RX ORDER — LIDOCAINE HYDROCHLORIDE 10 MG/ML
INJECTION, SOLUTION EPIDURAL; INFILTRATION; INTRACAUDAL; PERINEURAL PRN
Status: DISCONTINUED | OUTPATIENT
Start: 2020-05-05 | End: 2020-05-05 | Stop reason: ALTCHOICE

## 2020-05-05 ASSESSMENT — PAIN - FUNCTIONAL ASSESSMENT
PAIN_FUNCTIONAL_ASSESSMENT: 0-10
PAIN_FUNCTIONAL_ASSESSMENT: PREVENTS OR INTERFERES SOME ACTIVE ACTIVITIES AND ADLS

## 2020-05-05 ASSESSMENT — PAIN DESCRIPTION - DESCRIPTORS: DESCRIPTORS: ACHING;BURNING

## 2020-05-05 ASSESSMENT — PAIN SCALES - GENERAL
PAINLEVEL_OUTOF10: 0
PAINLEVEL_OUTOF10: 0

## 2020-05-10 ENCOUNTER — PATIENT MESSAGE (OUTPATIENT)
Dept: PHYSICAL MEDICINE AND REHAB | Age: 65
End: 2020-05-10

## 2020-05-11 ENCOUNTER — PATIENT MESSAGE (OUTPATIENT)
Dept: PHYSICAL MEDICINE AND REHAB | Age: 65
End: 2020-05-11

## 2020-05-11 NOTE — TELEPHONE ENCOUNTER
From: Sayra Gallagher  To: Samantha Hudson DO  Sent: 5/10/2020 7:49 PM EDT  Subject: Non-Urgent Medical Question    Good Morning. I had the epidural last week and it didn't work. In fact my back is feeling worse if that is possible Just thought I would let you know I don't know if there I another step or not. Thanks.  [facility]

## 2020-05-20 RX ORDER — TOPIRAMATE 25 MG/1
TABLET ORAL
Qty: 60 TABLET | Refills: 1 | Status: SHIPPED
Start: 2020-05-20 | End: 2021-03-15

## 2020-05-20 NOTE — TELEPHONE ENCOUNTER
Patient last visit 3- no return visit scheduled. Pharmacy requesting refill on Topamax 25 mg 1 tab bid sent 4- #60 1 refill. Please advise.

## 2020-05-26 ENCOUNTER — TELEPHONE (OUTPATIENT)
Dept: PHYSICAL MEDICINE AND REHAB | Age: 65
End: 2020-05-26

## 2020-05-29 ENCOUNTER — HOSPITAL ENCOUNTER (OUTPATIENT)
Age: 65
Discharge: HOME | End: 2020-05-31
Payer: MEDICARE

## 2020-05-29 PROCEDURE — U0003 INFECTIOUS AGENT DETECTION BY NUCLEIC ACID (DNA OR RNA); SEVERE ACUTE RESPIRATORY SYNDROME CORONAVIRUS 2 (SARS-COV-2) (CORONAVIRUS DISEASE [COVID-19]), AMPLIFIED PROBE TECHNIQUE, MAKING USE OF HIGH THROUGHPUT TECHNOLOGIES AS DESCRIBED BY CMS-2020-01-R: HCPCS

## 2020-05-30 LAB
SARS-COV-2: NOT DETECTED
SOURCE: NORMAL

## 2020-06-03 ENCOUNTER — PREP FOR PROCEDURE (OUTPATIENT)
Dept: PHYSICAL MEDICINE AND REHAB | Age: 65
End: 2020-06-03

## 2020-06-04 ENCOUNTER — HOSPITAL ENCOUNTER (OUTPATIENT)
Age: 65
Setting detail: OUTPATIENT SURGERY
Discharge: HOME | End: 2020-06-04
Attending: PHYSICAL MEDICINE & REHABILITATION | Admitting: PHYSICAL MEDICINE & REHABILITATION
Payer: MEDICARE

## 2020-06-04 ENCOUNTER — HOSPITAL ENCOUNTER (OUTPATIENT)
Dept: OPERATING ROOM | Age: 65
Setting detail: OUTPATIENT SURGERY
Discharge: HOME | End: 2020-06-04
Attending: PHYSICAL MEDICINE & REHABILITATION
Payer: MEDICARE

## 2020-06-04 VITALS
RESPIRATION RATE: 14 BRPM | SYSTOLIC BLOOD PRESSURE: 163 MMHG | HEART RATE: 66 BPM | TEMPERATURE: 96.4 F | OXYGEN SATURATION: 98 % | DIASTOLIC BLOOD PRESSURE: 78 MMHG

## 2020-06-04 PROBLEM — M54.16 LUMBAR RADICULITIS: Status: ACTIVE | Noted: 2020-06-04

## 2020-06-04 PROCEDURE — 6360000004 HC RX CONTRAST MEDICATION: Performed by: PHYSICAL MEDICINE & REHABILITATION

## 2020-06-04 PROCEDURE — 3600000005 HC SURGERY LEVEL 5 BASE: Performed by: PHYSICAL MEDICINE & REHABILITATION

## 2020-06-04 PROCEDURE — 64483 NJX AA&/STRD TFRM EPI L/S 1: CPT | Performed by: PHYSICAL MEDICINE & REHABILITATION

## 2020-06-04 PROCEDURE — 7100000010 HC PHASE II RECOVERY - FIRST 15 MIN: Performed by: PHYSICAL MEDICINE & REHABILITATION

## 2020-06-04 PROCEDURE — 7100000011 HC PHASE II RECOVERY - ADDTL 15 MIN: Performed by: PHYSICAL MEDICINE & REHABILITATION

## 2020-06-04 PROCEDURE — 2580000003 HC RX 258: Performed by: PHYSICAL MEDICINE & REHABILITATION

## 2020-06-04 PROCEDURE — 2500000003 HC RX 250 WO HCPCS: Performed by: PHYSICAL MEDICINE & REHABILITATION

## 2020-06-04 PROCEDURE — 2709999900 HC NON-CHARGEABLE SUPPLY: Performed by: PHYSICAL MEDICINE & REHABILITATION

## 2020-06-04 PROCEDURE — 3209999900 FLUORO FOR SURGICAL PROCEDURES

## 2020-06-04 PROCEDURE — 6360000002 HC RX W HCPCS: Performed by: PHYSICAL MEDICINE & REHABILITATION

## 2020-06-04 RX ORDER — LIDOCAINE HYDROCHLORIDE 10 MG/ML
INJECTION, SOLUTION EPIDURAL; INFILTRATION; INTRACAUDAL; PERINEURAL PRN
Status: DISCONTINUED | OUTPATIENT
Start: 2020-06-04 | End: 2020-06-04 | Stop reason: ALTCHOICE

## 2020-06-04 ASSESSMENT — PAIN SCALES - GENERAL
PAINLEVEL_OUTOF10: 0
PAINLEVEL_OUTOF10: 0

## 2020-06-04 ASSESSMENT — PAIN DESCRIPTION - DESCRIPTORS: DESCRIPTORS: ACHING

## 2020-06-04 ASSESSMENT — PAIN - FUNCTIONAL ASSESSMENT: PAIN_FUNCTIONAL_ASSESSMENT: 0-10

## 2020-06-04 NOTE — H&P
Yulia, [id number] [facility]  [address] [facility]. [address] [facility] [address]  Phone: [phone]  Fax: [phone]    PCP: AMBREEN Bundy CNP  Date of visit: 6/4/2020    CC: low back and leg pain       Kory Grider is a 77 y.o. female who presents today for epidural injection. Patient complains of low back pain that radiates into left leg. No red flag symptoms. Consents to proceed with procedure. Allergies   Allergen Reactions    Enbrel [Etanercept]      Swelling    Humira [Adalimumab]      Swelling     Penicillins Swelling and Rash     n/v       No current facility-administered medications for this encounter.         Past Medical History:   Diagnosis Date    Anxiety and depression     Diverticulitis     GERD (gastroesophageal reflux disease)     Rheumatoid arthritis involving multiple sites with positive rheumatoid factor ([facility] [address] 9/12/2017    Thyroid disease        Past Surgical History:   Procedure Laterality Date    ANESTHESIA NERVE BLOCK Left 5/5/2020    LEFT L5 TRANSFORAMINAL EPIDURAL STEROID INJECTION performed by DO Yulia at [address] [facility] [place], [address]  02/23/2017    robotic assisted cholecystectomy with IOC    COLONOSCOPY  09/11/2017    NERVE BLOCK Left 05/05/2020    NERVE BLOCK Left 05/05/2020    OTHER SURGICAL HISTORY  04/11/2017    Laparoscopic Hand Assisted Open Sigmoid Colectomy with Colostomy     OTHER SURGICAL HISTORY  09/12/2017    laparscopic robotic assited colostomy reversed    TONSILLECTOMY         Family History   Problem Relation Age of Onset    Diabetes Mother     Breast Cancer Mother     Other Father     Heart Disease Father             ROS:    Constitutional: Denies fevers, chills, night sweats, unintentional weight loss     Skin: Denies rash or skin changes     Respiratory: Denies SOB or cough     Cardiovascular: Denies CP, palpitations, edema Neurologic: See HPI.     MSK: See HPI. Hematologic/Lymphatic/Immunologic: Denies bruising       Physical Exam:   Blood pressure 139/68, pulse 66, temperature 96.4 °F (35.8 °C), temperature source Skin, resp. rate 18, SpO2 95 %. General: well developed and well nourished in no acute distress  Resp: symmetrical chest expansion, unlabored breathing, respirations unlabored. CV: Heart rate is regular. Peripheral pulses are palpable  Skin: No rashes or ecchymosis. Normal turgor. MSK: ttp lumbar psps     Neurological Exam:  2/5 left ankle eversion, 2/5 left ankle dorsiflexion, 4/5 left knee extension, otherwise, 5/5 in all myotomes in the upper and lower extremities. Reflexes 2+ and symmetric. Impression:     Lumbar radiculitis     Plan:   · Injection as planned today           Bal Gaspar DO, FAAPMR   Board Certified Physical Medicine and Rehabilitation    The patient was counseled at length about the risks of vamsi Covid-19 during their perioperative period and any recovery window from their procedure. The patient was made aware that vamsi Covid-19  may worsen their prognosis for recovering from their procedure  and lend to a higher morbidity and/or mortality risk. All material risks, benefits, and reasonable alternatives including postponing the procedure were discussed. The patient does wish to proceed with the procedure at this time.

## 2020-06-04 NOTE — OP NOTE
LUMBOSACRAL EPIDURAL STEROID INJECTION, TRANSFORAMINAL APPROACH       WITH FLUOROSCOPIC GUIDANCE    Patient: Alexandra Brown                         MRN#: [id number]  : 1955   Date of procedure: 2020      Physician Performing Procedure:  Dimitri Chand DO    Clinical Scenario: As per electronic documentation. Diagnosis: lumbar radiculitis    Injectate: A total of 3cc, consisting of 1 cc of Dexamethasone 10mg/ml,  with the remainder of normal saline    Levels Treated:  Left L4-5 neural foramen    Approach:   Transforaminal    Improvement after today's procedure: As per nursing record. Comments:  None     Pre-procedural evaluation: The patient was examined today just prior to performing the procedure listed above. The patient's heart rate was normal, lungs were clear to auscultation bilaterally. Procedure: The patient was prepped and draped in a sterile fashion in the prone position after informed consent was signed and all the patient's questions were answered including the risks, benefits, alternative treatment options, and prognosis. The risks include - but are not limited to - infection, allergic reaction, increased pain, lack of therapeutic benefit, steroid reaction, nerve damage, paralysis, stroke, epidural hematoma, syncope, headache, respiratory or cardiac arrest, and scar formation. The C-arm was positioned so that an oblique view of the neural foramen as noted above was visualized. The soft tissues overlying this structure were infiltrated with 2-3 cc. of 1% Lidocaine without Epinephrine. A 22 gauge 5 inch spinal needle was inserted toward the target using a trajectory view along the fluoroscope beam.  Under AP and lateral visualization, the needle was advanced so it did not puncture dura. Biplanar projections were used to confirm position. Aspiration was confirmed to be negative for CSF and/or blood. A 1-2 cc. volume of Isovue contrast was injected at this level.   The

## 2020-06-10 ENCOUNTER — TELEPHONE (OUTPATIENT)
Dept: FAMILY MEDICINE CLINIC | Age: 65
End: 2020-06-10

## 2020-06-15 RX ORDER — TOPIRAMATE 25 MG/1
TABLET ORAL
Qty: 60 TABLET | Refills: 1 | OUTPATIENT
Start: 2020-06-15

## 2020-06-15 RX ORDER — SERTRALINE HYDROCHLORIDE 100 MG/1
TABLET, FILM COATED ORAL
Qty: 30 TABLET | Refills: 0 | OUTPATIENT
Start: 2020-06-15

## 2020-06-15 RX ORDER — OMEPRAZOLE 20 MG/1
CAPSULE, DELAYED RELEASE ORAL
Qty: 30 CAPSULE | Refills: 0 | OUTPATIENT
Start: 2020-06-15

## 2020-06-15 RX ORDER — LEVOTHYROXINE SODIUM 0.12 MG/1
TABLET ORAL
Qty: 30 TABLET | Refills: 0 | OUTPATIENT
Start: 2020-06-15

## 2020-06-16 ENCOUNTER — OFFICE VISIT (OUTPATIENT)
Dept: FAMILY MEDICINE CLINIC | Age: 65
End: 2020-06-16
Payer: MEDICARE

## 2020-06-16 VITALS
TEMPERATURE: 97.5 F | HEART RATE: 75 BPM | HEIGHT: 72 IN | SYSTOLIC BLOOD PRESSURE: 118 MMHG | RESPIRATION RATE: 20 BRPM | OXYGEN SATURATION: 98 % | DIASTOLIC BLOOD PRESSURE: 68 MMHG | BODY MASS INDEX: 39.68 KG/M2 | WEIGHT: 293 LBS

## 2020-06-16 PROBLEM — L71.9 ACNE ROSACEA: Status: ACTIVE | Noted: 2020-06-16

## 2020-06-16 PROBLEM — R01.1 SYSTOLIC MURMUR: Status: ACTIVE | Noted: 2020-06-16

## 2020-06-16 PROBLEM — R78.81 BACTEREMIA: Status: RESOLVED | Noted: 2019-05-26 | Resolved: 2020-06-16

## 2020-06-16 PROBLEM — R78.81 GRAM-NEGATIVE BACTEREMIA: Status: RESOLVED | Noted: 2019-05-26 | Resolved: 2020-06-16

## 2020-06-16 PROCEDURE — 99214 OFFICE O/P EST MOD 30 MIN: CPT | Performed by: NURSE PRACTITIONER

## 2020-06-16 RX ORDER — SERTRALINE HYDROCHLORIDE 100 MG/1
TABLET, FILM COATED ORAL
Qty: 30 TABLET | Refills: 0 | Status: SHIPPED
Start: 2020-06-16 | End: 2020-07-22 | Stop reason: SDUPTHER

## 2020-06-16 RX ORDER — METRONIDAZOLE 10 MG/G
GEL TOPICAL
Qty: 60 G | Refills: 0 | Status: SHIPPED
Start: 2020-06-16 | End: 2020-07-22

## 2020-06-16 RX ORDER — OMEPRAZOLE 20 MG/1
CAPSULE, DELAYED RELEASE ORAL
Qty: 30 CAPSULE | Refills: 0 | Status: SHIPPED
Start: 2020-06-16 | End: 2020-07-22 | Stop reason: SDUPTHER

## 2020-06-16 RX ORDER — FOLIC ACID 1 MG/1
TABLET ORAL
Qty: 30 TABLET | Refills: 0 | Status: SHIPPED
Start: 2020-06-16 | End: 2021-03-15 | Stop reason: SDUPTHER

## 2020-06-16 RX ORDER — LEVOTHYROXINE SODIUM 0.12 MG/1
TABLET ORAL
Qty: 30 TABLET | Refills: 0 | Status: SHIPPED
Start: 2020-06-16 | End: 2020-07-22 | Stop reason: SDUPTHER

## 2020-06-16 ASSESSMENT — ENCOUNTER SYMPTOMS
SINUS PRESSURE: 0
SORE THROAT: 0
SINUS PAIN: 0
CONSTIPATION: 0
TROUBLE SWALLOWING: 0
RHINORRHEA: 0
BACK PAIN: 0
COLOR CHANGE: 1
SHORTNESS OF BREATH: 0
NAUSEA: 0
CHEST TIGHTNESS: 0
COUGH: 0
FACIAL SWELLING: 0
VOMITING: 0
VOICE CHANGE: 0
WHEEZING: 0
DIARRHEA: 0
ABDOMINAL PAIN: 0

## 2020-06-16 ASSESSMENT — PATIENT HEALTH QUESTIONNAIRE - PHQ9
2. FEELING DOWN, DEPRESSED OR HOPELESS: 0
SUM OF ALL RESPONSES TO PHQ QUESTIONS 1-9: 0
SUM OF ALL RESPONSES TO PHQ9 QUESTIONS 1 & 2: 0
SUM OF ALL RESPONSES TO PHQ QUESTIONS 1-9: 0
1. LITTLE INTEREST OR PLEASURE IN DOING THINGS: 0

## 2020-06-16 NOTE — PROGRESS NOTES
by mouth daily , Disp: , Rfl:     fluticasone (FLONASE) 50 MCG/ACT nasal spray, 1 spray by Nasal route as needed , Disp: , Rfl:     tears naturale II (CELLUGEL) SOLN ophthalmic solution, Place 1 drop into the left eye as needed for Dry Eyes, Disp: 1 Bottle, Rfl: 2    methotrexate (RHEUMATREX) 2.5 MG chemo tablet, , Disp: , Rfl:     Multiple Vitamins-Minerals (MULTI-VITAMIN GUMMIES PO), Take 2 tablets by mouth daily, Disp: , Rfl:   Social History     Socioeconomic History    Marital status: Single     Spouse name: None    Number of children: None    Years of education: None    Highest education level: None   Occupational History    None   Social Needs    Financial resource strain: None    Food insecurity     Worry: None     Inability: None    Transportation needs     Medical: None     Non-medical: None   Tobacco Use    Smoking status: Former Smoker     Packs/day: 0.00     Years: 0.00     Pack years: 0.00     Last attempt to quit: 1986     Years since quittin.5    Smokeless tobacco: Never Used    Tobacco comment: quit 20 yrs ago   Substance and Sexual Activity    Alcohol use: No    Drug use: No    Sexual activity: None   Lifestyle    Physical activity     Days per week: None     Minutes per session: None    Stress: None   Relationships    Social connections     Talks on phone: None     Gets together: None     Attends Latter-day service: None     Active member of club or organization: None     Attends meetings of clubs or organizations: None     Relationship status: None    Intimate partner violence     Fear of current or ex partner: None     Emotionally abused: None     Physically abused: None     Forced sexual activity: None   Other Topics Concern    None   Social History Narrative    None       I have reviewed Rosalie's allergies, medications, problem list, medical, social and family history and have updated as needed in the electronic medical record    Review of Systems Constitutional: Negative for activity change, appetite change, chills, diaphoresis, fatigue, fever and unexpected weight change. HENT: Positive for nosebleeds. Negative for congestion, dental problem, drooling, ear discharge, ear pain, facial swelling, hearing loss, mouth sores, postnasal drip, rhinorrhea, sinus pressure, sinus pain, sneezing, sore throat, tinnitus, trouble swallowing and voice change. Eyes: Negative for visual disturbance. Respiratory: Negative for cough, chest tightness, shortness of breath and wheezing. Cardiovascular: Negative for chest pain, palpitations and leg swelling. Gastrointestinal: Negative for abdominal pain, constipation, diarrhea, nausea and vomiting. Endocrine: Negative for cold intolerance, heat intolerance, polydipsia, polyphagia and polyuria. Genitourinary: Negative for difficulty urinating, frequency and urgency. Musculoskeletal: Positive for arthralgias and gait problem. Negative for back pain, joint swelling, myalgias, neck pain and neck stiffness. Skin: Positive for color change and rash. Negative for pallor and wound. [place]   Allergic/Immunologic: Negative for environmental allergies, food allergies and immunocompromised state. Neurological: Negative for dizziness, tremors, seizures, syncope, facial asymmetry, speech difficulty, weakness, light-headedness, numbness and headaches. Hematological: Negative for adenopathy. Does not bruise/bleed easily. Psychiatric/Behavioral: Negative for agitation, behavioral problems, confusion, decreased concentration, dysphoric mood, hallucinations, self-injury, sleep disturbance and suicidal ideas. The patient is not nervous/anxious and is not hyperactive.         OBJECTIVE:     VS:  Wt Readings from Last 3 Encounters:   06/16/20 (!) 339 lb (153.8 kg)   03/10/20 (!) 349 lb 6.4 oz (158.5 kg)   01/29/20 (!) 350 lb (158.8 kg)                       Vitals:    06/16/20 1306   BP: 118/68   Pulse: 75   Resp: 20 once daily  -     sertraline (ZOLOFT) 100 MG tablet; take 1 tablet by mouth once daily  -     levothyroxine (SYNTHROID) 125 MCG tablet; take 1 tablet by mouth once daily    Acne rosacea New  -     metroNIDAZOLE (METROGEL) 1 % gel; Apply topically daily. Apply thin layer daily after facial cleansing    Left-sided epistaxis New  Use nasogel twice a day to the nose, do not pick or manipulate the nose and let the scab come off naturally as the nose heals  Keep the head above the waist if at all possible to help control bleeding    Impaired fasting blood sugar New  -     Hemoglobin A1C; Future      Not addressed today  Vitamin D deficiency  -     Vitamin D 25 Hydroxy; Future    Anxiety and depression    Hypothyroidism, unspecified type  -     TSH without Reflex; Future  -     T4, Free; Future  -     Thyroid Peroxidase Antibody; Future    Gastroesophageal reflux disease, esophagitis presence not specified  -     CBC Auto Differential; Future  -     Comprehensive Metabolic Panel; Future      Morbid (severe) obesity due to excess calories ([address]   -     Lipid Panel; Future                Return in about 1 month (around 7/16/2020) for test results, weight reduction, hypothyroid, GERD. Discussed weight loss, Discussed exercising 30 minutes daily and Discussed taking medications as directed and adverse effects        I have reviewed my findings and recommendations with Kezia Carmona, NP-C, FNP-BC

## 2020-06-16 NOTE — PATIENT INSTRUCTIONS
symptoms are worse after you eat a certain food, you may want to stop eating that food to see if your symptoms get better. · Do not smoke or chew tobacco. Smoking can make GERD worse. If you need help quitting, talk to your doctor about stop-smoking programs and medicines. These can increase your chances of quitting for good. · If you have GERD symptoms at night, raise the head of your bed 6 to 8 inches by putting the frame on blocks or placing a foam wedge under the head of your mattress. (Adding extra pillows does not work.)  · Do not wear tight clothing around your middle. · Lose weight if you need to. Losing just 5 to 10 pounds can help. When should you call for help? Call your doctor now or seek immediate medical care if:  · You have new or different belly pain. · Your stools are black and tarlike or have streaks of blood. Watch closely for changes in your health, and be sure to contact your doctor if:  · Your symptoms have not improved after 2 days. · Food seems to catch in your throat or chest.  Where can you learn more? Go to https://[employer]. org and sign in to your Whelse account. Enter A764 in the Neronote box to learn more about \"Gastroesophageal Reflux Disease (GERD): Care Instructions. \"     If you do not have an account, please click on the \"Sign Up Now\" link. Current as of: August 12, 2019               Content Version: 12.5  © 8037-6180 [employer]. Care instructions adapted under license by [facility] ([facility]). If you have questions about a medical condition or this instruction, always ask your healthcare professional. [place] [address] any warranty or liability for your use of this information. Patient Education        Rosacea: Care Instructions  Your Care Instructions  Rosacea (say \"jxx-ZZN-nkhm\") is a skin condition that can cause redness, pimples, and red lines on the nose, cheeks, chin, and forehead.  It is often mistaken for acne because it can cause outbreaks with bumps like pimples. Rosacea can also cause burning and soreness in your eyes. Rosacea is usually controlled by using medicine and avoiding alcohol, the sun, and other things that can make rosacea worse. Your doctor may have prescribed medicines or other treatment. If antibiotics do not control the rosacea, your doctor may try other medicines. Follow-up care is a key part of your treatment and safety. Be sure to make and go to all appointments, and call your doctor if you are having problems. It's also a good idea to know your test results and keep a list of the medicines you take. How can you care for yourself at home? · Take your medicines exactly as prescribed. Call your doctor if you think you are having a problem with your medicine. · If your doctor prescribed antibiotics, take them as directed. Do not stop taking them just because you feel better. You need to take the full course of antibiotics. · Always wear sunscreen on exposed skin. Make sure to use a broad-spectrum sunscreen that has a sun protection factor (SPF) of 30 or higher. Use it every day, even when it is cloudy. · Use soaps, lotions, and makeup made for sensitive skin that do not contain alcohol, are not abrasive, and will not clog pores. · Avoid rubbing or scrubbing your face. · Green-based makeup may help mask the redness of an outbreak. Your doctor may be able to refer you to someone who can help you use makeup to cover redness and bumps. · If you have rosacea on your eyelids, put a warm, wet towel, or compress, on your eyes several times a day. Gently wash your eyelids with a washcloth or an eyelid cleanser that is sold in tweetTVes. Use artificial tears if your eyes feel dry. · Make a list or keep a diary of things that may trigger your rosacea. Use the diary every day for several weeks. Avoid whatever you find that makes your rosacea worse.  These triggers may include:  ? Harsh weather. Wear a hat and scarf to shield your face from the cold and wind. Use a moisturizer during the winter to keep your face moist.  ? Stress. Eat a healthy diet and get plenty of exercise and sleep. ? Alcohol, spicy foods, or hot drinks. Avoid or limit these if they make your rosacea worse. ? Getting too hot when you exercise. Try working out for a shorter time. In the summer, exercise during the cool morning hours. ? Hot showers. Take warm or cool showers and avoid hot tubs and saunas. When should you call for help? Watch closely for changes in your health, and be sure to contact your doctor if:  · You do not get better as expected. Where can you learn more? Go to https://[employer]ukebox. org and sign in to your Teads account. Enter T954 in the Weblance box to learn more about \"Rosacea: Care Instructions. \"     If you do not have an account, please click on the \"Sign Up Now\" link. Current as of: October 31, 2019               Content Version: 12.5  © 0365-2251 [employer]. Care instructions adapted under license by [facility] ([facility]). If you have questions about a medical condition or this instruction, always ask your healthcare professional. [address] any warranty or liability for your use of this information. Patient Education        Nosebleeds: Care Instructions  Your Care Instructions     Nosebleeds are common, especially if you have colds or allergies. Many things can cause a nosebleed. Some nosebleeds stop on their own with pressure. Others need packing. Some get cauterized (sealed). If you have gauze or other packing materials in your nose, you will need to follow up with your doctor to have the packing removed. You may need more treatment if you get nosebleeds a lot. The doctor has checked you carefully, but problems can develop later. If you notice any problems or new symptoms, get medical treatment right away.   Follow-up care is a key part of your treatment and safety. Be sure to make and go to all appointments, and call your doctor if you are having problems. It's also a good idea to know your test results and keep a list of the medicines you take. How can you care for yourself at home? · If you get another nosebleed:  ? Sit up and tilt your head slightly forward. This keeps blood from going down your throat. ? Use your thumb and index finger to pinch your nose shut for 10 minutes. Use a clock. Do not check to see if the bleeding has stopped before the 10 minutes are up. If the bleeding has not stopped, pinch your nose shut for another 10 minutes. ? When the bleeding has stopped, try not to pick, rub, or blow your nose for 12 hours. Avoiding these things helps keep your nose from bleeding again. · If your doctor prescribed antibiotics, take them as directed. Do not stop taking them just because you feel better. You need to take the full course of antibiotics. To prevent nosebleeds  · Do not blow your nose too hard. · Try not to lift or strain after a nosebleed. · Raise your head on a pillow while you sleep. · Put a thin layer of a saline- or water-based nasal gel, such as NasoGel, inside your nose. Put it on the septum, which divides your nostrils. This will prevent dryness that can cause nosebleeds. · Use a vaporizer or humidifier to add moisture to your bedroom. Follow the directions for cleaning the machine. · Do not use aspirin, ibuprofen (Advil, Motrin), or naproxen (Aleve) for 36 to 48 hours after a nosebleed unless your doctor tells you to. You can use acetaminophen (Tylenol) for pain relief. · Talk to your doctor about stopping any other medicines you are taking. Some medicines may make you more likely to get a nosebleed. · Do not use cold medicines or nasal sprays without first talking to your doctor. They can make your nose dry. When should you call for help?    [id number] anytime you think you may need

## 2020-06-17 PROBLEM — R73.01 IMPAIRED FASTING BLOOD SUGAR: Status: ACTIVE | Noted: 2020-06-17

## 2020-06-17 PROBLEM — E66.01 MORBID (SEVERE) OBESITY DUE TO EXCESS CALORIES (HCC): Status: ACTIVE | Noted: 2020-06-17

## 2020-06-25 ENCOUNTER — HOSPITAL ENCOUNTER (OUTPATIENT)
Age: 65
Discharge: HOME | End: 2020-06-27
Payer: MEDICARE

## 2020-06-25 ENCOUNTER — OFFICE VISIT (OUTPATIENT)
Dept: PHYSICAL MEDICINE AND REHAB | Age: 65
End: 2020-06-25
Payer: MEDICARE

## 2020-06-25 ENCOUNTER — TELEPHONE (OUTPATIENT)
Dept: PHYSICAL MEDICINE AND REHAB | Age: 65
End: 2020-06-25

## 2020-06-25 VITALS
BODY MASS INDEX: 39.68 KG/M2 | HEIGHT: 72 IN | HEART RATE: 85 BPM | SYSTOLIC BLOOD PRESSURE: 126 MMHG | WEIGHT: 293 LBS | DIASTOLIC BLOOD PRESSURE: 70 MMHG

## 2020-06-25 LAB
ALBUMIN SERPL-MCNC: 4.7 G/DL (ref 3.5–5.2)
ALP BLD-CCNC: 110 U/L (ref 35–104)
ALT SERPL-CCNC: 34 U/L (ref 0–32)
ANION GAP SERPL CALCULATED.3IONS-SCNC: 14 MMOL/L (ref 7–16)
ANISOCYTOSIS: ABNORMAL
AST SERPL-CCNC: 35 U/L (ref 0–31)
ATYPICAL LYMPHOCYTE RELATIVE PERCENT: 7 % (ref 0–4)
BASOPHILS ABSOLUTE: 0 E9/L (ref 0–0.2)
BASOPHILS RELATIVE PERCENT: 1.1 % (ref 0–2)
BILIRUB SERPL-MCNC: 0.6 MG/DL (ref 0–1.2)
BUN BLDV-MCNC: 18 MG/DL (ref 8–23)
CALCIUM SERPL-MCNC: 9.6 MG/DL (ref 8.6–10.2)
CHLORIDE BLD-SCNC: 107 MMOL/L (ref 98–107)
CHOLESTEROL, TOTAL: 187 MG/DL (ref 0–199)
CO2: 21 MMOL/L (ref 22–29)
CREAT SERPL-MCNC: 0.8 MG/DL (ref 0.5–1)
EOSINOPHILS ABSOLUTE: 0.06 E9/L (ref 0.05–0.5)
EOSINOPHILS RELATIVE PERCENT: 1.7 % (ref 0–6)
GFR AFRICAN AMERICAN: >60
GFR NON-AFRICAN AMERICAN: >60 ML/MIN/1.73
GLUCOSE BLD-MCNC: 116 MG/DL (ref 74–99)
HBA1C MFR BLD: 5.8 % (ref 4–5.6)
HCT VFR BLD CALC: 44.5 % (ref 34–48)
HDLC SERPL-MCNC: 57 MG/DL
HEMOGLOBIN: 14.5 G/DL (ref 11.5–15.5)
LDL CHOLESTEROL CALCULATED: 103 MG/DL (ref 0–99)
LYMPHOCYTES ABSOLUTE: 0.43 E9/L (ref 1.5–4)
LYMPHOCYTES RELATIVE PERCENT: 5.2 % (ref 20–42)
MCH RBC QN AUTO: 27.9 PG (ref 26–35)
MCHC RBC AUTO-ENTMCNC: 32.6 % (ref 32–34.5)
MCV RBC AUTO: 85.6 FL (ref 80–99.9)
METAMYELOCYTES RELATIVE PERCENT: 1.7 % (ref 0–1)
MONOCYTES ABSOLUTE: 0.4 E9/L (ref 0.1–0.95)
MONOCYTES RELATIVE PERCENT: 11.3 % (ref 2–12)
NEUTROPHILS ABSOLUTE: 2.7 E9/L (ref 1.8–7.3)
NEUTROPHILS RELATIVE PERCENT: 73 % (ref 43–80)
NUCLEATED RED BLOOD CELLS: 0.9 /100 WBC
OVALOCYTES: ABNORMAL
PDW BLD-RTO: 15.1 FL (ref 11.5–15)
PLATELET # BLD: 187 E9/L (ref 130–450)
PMV BLD AUTO: 10.2 FL (ref 7–12)
POIKILOCYTES: ABNORMAL
POLYCHROMASIA: ABNORMAL
POTASSIUM SERPL-SCNC: 4 MMOL/L (ref 3.5–5)
RBC # BLD: 5.2 E12/L (ref 3.5–5.5)
SODIUM BLD-SCNC: 142 MMOL/L (ref 132–146)
T4 FREE: 1.5 NG/DL (ref 0.93–1.7)
TOTAL PROTEIN: 7.4 G/DL (ref 6.4–8.3)
TRIGL SERPL-MCNC: 133 MG/DL (ref 0–149)
TSH SERPL DL<=0.05 MIU/L-ACNC: 4.53 UIU/ML (ref 0.27–4.2)
VITAMIN D 25-HYDROXY: 24 NG/ML (ref 30–100)
VLDLC SERPL CALC-MCNC: 27 MG/DL
WBC # BLD: 3.6 E9/L (ref 4.5–11.5)

## 2020-06-25 PROCEDURE — 82306 VITAMIN D 25 HYDROXY: CPT

## 2020-06-25 PROCEDURE — 84439 ASSAY OF FREE THYROXINE: CPT

## 2020-06-25 PROCEDURE — 36415 COLL VENOUS BLD VENIPUNCTURE: CPT

## 2020-06-25 PROCEDURE — 86376 MICROSOMAL ANTIBODY EACH: CPT

## 2020-06-25 PROCEDURE — 80061 LIPID PANEL: CPT

## 2020-06-25 PROCEDURE — 99214 OFFICE O/P EST MOD 30 MIN: CPT | Performed by: PHYSICAL MEDICINE & REHABILITATION

## 2020-06-25 PROCEDURE — 85025 COMPLETE CBC W/AUTO DIFF WBC: CPT

## 2020-06-25 PROCEDURE — 84443 ASSAY THYROID STIM HORMONE: CPT

## 2020-06-25 PROCEDURE — 83036 HEMOGLOBIN GLYCOSYLATED A1C: CPT

## 2020-06-25 PROCEDURE — 80053 COMPREHEN METABOLIC PANEL: CPT

## 2020-06-25 RX ORDER — DICLOFENAC SODIUM AND MISOPROSTOL 50; 200 MG/1; UG/1
1 TABLET, DELAYED RELEASE ORAL 2 TIMES DAILY PRN
Qty: 60 TABLET | Refills: 2 | Status: SHIPPED
Start: 2020-06-25 | End: 2021-03-15

## 2020-06-25 RX ORDER — ACETAMINOPHEN 500 MG
500 TABLET ORAL EVERY 6 HOURS PRN
Qty: 120 TABLET | Refills: 2 | Status: SHIPPED
Start: 2020-06-25 | End: 2021-03-15 | Stop reason: ALTCHOICE

## 2020-06-25 NOTE — PROGRESS NOTES
Dylan Brand D.O. [facility]  [address] [facility]  Phone: [phone]  Fax: [phone]        6/25/20    Chief Complaint   Patient presents with    Back Pain     Follow up after TFESI       HPI:  Author Bijan is a 77y.o. year old woman seen today in follow up regarding low back pain. Interval history: Since the last visit the patient had left L4-5 TFESI by Dr. Marianela Bridges on 6/4/20 and it provided 100 % relief for one day. Today, the pain is rated Pain Score:  10 - Worst pain ever where 0 is no pain and 10 is pain as bad as it can be. The pain is located in the low back,  radiates distally to the left leg, and is described as aching, shooting. This pain occurs all day. The symptoms have been unchanged since onset. Symptoms are exacerbated by walking. Factors which relieve the pain include nothing. Other associated symptoms include paresthesias left leg. Otherwise, the pain assessment has not changed since the last visit.      Past Medical History:   Diagnosis Date    Anxiety and depression     Bacteremia 5/26/2019    Chronic back pain     Diverticulitis     GERD (gastroesophageal reflux disease)     Gram-negative bacteremia 5/26/2019    Hypothyroidism     Neuropathy     Obesity     Rheumatoid arthritis involving multiple sites with positive rheumatoid factor ([facility] [address] 9/12/2017    Thyroid disease        Past Surgical History:   Procedure Laterality Date    ANESTHESIA NERVE BLOCK Left 5/5/2020    LEFT L5 TRANSFORAMINAL EPIDURAL STEROID INJECTION performed by [place]-PlDO mel at [address]  02/23/2017    robotic assisted cholecystectomy with IOC    COLONOSCOPY  09/11/2017    NERVE BLOCK Left 05/05/2020    NERVE BLOCK Left 05/05/2020    NERVE BLOCK Left 06/04/2020    transforaminal    OTHER SURGICAL HISTORY  04/11/2017    Laparoscopic Hand Assisted Open bilateral SIJ,  gluteal muscles,  pubic tubercle, PSIS,  ischial tuberosity,  greater trochanter,  ASIS,  iliac crest.  No trigger points. No spasm. No edema, erythema, ecchymosis,  mass or deformity. Taut bands absent. Popliteal angle is normal. Seated straight leg raise positive on left. SIJ: Gillet negative bilaterally. KHALIF negative bilaterally. ASIS compression test negative bilaterally. . Hip: Full painless AROM bilaterally. Km negative bilaterally. Trendelenburg negative bilaterally. Neurologic: Awake, alert and oriented in three planes. Speech is fluent. No facial weakness. Tongue is midline. Hearing is intact for conversation. Pupils are equal and round. Extraocular muscles are intact. Hearing is intact for conversation. Shoulder shrug symmetric. Sensation: Diminished left lateral leg to light touch, otherwise intact for light touch and pin prick in all upper and lower extremity dermatomes. Vibration and proprioception are intact at the bilateral first MTP. Strength: 2/5 left ankle eversion, 2/5 left ankle dorsiflexion, 4/5 left knee extension, otherwise, 5/5 in all myotomes in the upper and lower extremities. Muscle Tendon Reflexes: 2+ symmetric in the bilateral upper and lower extremities. Babinski is downgoing bilaterally. Alyssa Lapine is negative bilaterally. Gait is Drop-foot on left. Romberg is negative. Heel and toe walk are unable. Tandem walk is unable  No clonus or spasticity. The patient was able to rise from a chair and squat without difficulty. There is no tremor. Impression:   1. Lumbosacral radiculopathy at L5    2. Herniated lumbar intervertebral disc    3. Chronic bilateral low back pain with left-sided sciatica    4. Lumbar spondylosis    5.  Obesity, morbid, BMI 40.0-49.9 ([facility] [address]        Plan:  Orders Placed This Encounter   Procedures    Ambulatory epidural steroid injection     Scheduling Instructions:      Left L4-5 Transforaminal Epidural steroid injection by

## 2020-06-27 LAB — THYROID PEROXIDASE (TPO) ABS: 366 IU/ML (ref 0–9)

## 2020-07-02 ENCOUNTER — PATIENT MESSAGE (OUTPATIENT)
Dept: FAMILY MEDICINE CLINIC | Age: 65
End: 2020-07-02

## 2020-07-02 ENCOUNTER — HOSPITAL ENCOUNTER (OUTPATIENT)
Age: 65
Discharge: HOME | End: 2020-07-04
Payer: MEDICARE

## 2020-07-02 PROCEDURE — U0003 INFECTIOUS AGENT DETECTION BY NUCLEIC ACID (DNA OR RNA); SEVERE ACUTE RESPIRATORY SYNDROME CORONAVIRUS 2 (SARS-COV-2) (CORONAVIRUS DISEASE [COVID-19]), AMPLIFIED PROBE TECHNIQUE, MAKING USE OF HIGH THROUGHPUT TECHNOLOGIES AS DESCRIBED BY CMS-2020-01-R: HCPCS

## 2020-07-02 NOTE — TELEPHONE ENCOUNTER
Ruben Wiggins,  I recall seeing them and some things were off but I don't think anything serious unless there was something I didn't get that you did.  Have a safe 4th!!

## 2020-07-03 LAB
SARS-COV-2: NOT DETECTED
SOURCE: NORMAL

## 2020-07-08 ENCOUNTER — PREP FOR PROCEDURE (OUTPATIENT)
Dept: PHYSICAL MEDICINE AND REHAB | Age: 65
End: 2020-07-08

## 2020-07-14 ENCOUNTER — HOSPITAL ENCOUNTER (OUTPATIENT)
Age: 65
Setting detail: OUTPATIENT SURGERY
Discharge: HOME | End: 2020-07-14
Attending: PHYSICAL MEDICINE & REHABILITATION | Admitting: PHYSICAL MEDICINE & REHABILITATION
Payer: MEDICARE

## 2020-07-14 VITALS
TEMPERATURE: 97.5 F | DIASTOLIC BLOOD PRESSURE: 84 MMHG | SYSTOLIC BLOOD PRESSURE: 116 MMHG | RESPIRATION RATE: 14 BRPM | OXYGEN SATURATION: 99 % | HEART RATE: 58 BPM

## 2020-07-14 PROCEDURE — 6360000002 HC RX W HCPCS: Performed by: PHYSICAL MEDICINE & REHABILITATION

## 2020-07-14 PROCEDURE — 3600000005 HC SURGERY LEVEL 5 BASE: Performed by: PHYSICAL MEDICINE & REHABILITATION

## 2020-07-14 PROCEDURE — 2709999900 HC NON-CHARGEABLE SUPPLY: Performed by: PHYSICAL MEDICINE & REHABILITATION

## 2020-07-14 PROCEDURE — 64483 NJX AA&/STRD TFRM EPI L/S 1: CPT | Performed by: PHYSICAL MEDICINE & REHABILITATION

## 2020-07-14 PROCEDURE — 2500000003 HC RX 250 WO HCPCS: Performed by: PHYSICAL MEDICINE & REHABILITATION

## 2020-07-14 PROCEDURE — 2580000003 HC RX 258: Performed by: PHYSICAL MEDICINE & REHABILITATION

## 2020-07-14 PROCEDURE — 7100000010 HC PHASE II RECOVERY - FIRST 15 MIN: Performed by: PHYSICAL MEDICINE & REHABILITATION

## 2020-07-14 PROCEDURE — 6360000004 HC RX CONTRAST MEDICATION: Performed by: PHYSICAL MEDICINE & REHABILITATION

## 2020-07-14 RX ORDER — LIDOCAINE HYDROCHLORIDE 10 MG/ML
INJECTION, SOLUTION EPIDURAL; INFILTRATION; INTRACAUDAL; PERINEURAL PRN
Status: DISCONTINUED | OUTPATIENT
Start: 2020-07-14 | End: 2020-07-14 | Stop reason: ALTCHOICE

## 2020-07-14 ASSESSMENT — PAIN SCALES - GENERAL
PAINLEVEL_OUTOF10: 0
PAINLEVEL_OUTOF10: 0

## 2020-07-14 ASSESSMENT — PAIN - FUNCTIONAL ASSESSMENT: PAIN_FUNCTIONAL_ASSESSMENT: 0-10

## 2020-07-14 NOTE — H&P
Chao Ddoge, [id number] [facility]  [address] [facility] [address]  Phone: [phone]  Fax: [phone]    PCP: AMBREEN Arnold CNP  Date of visit: 7/14/2020    CC: low back and leg pain       Em Dobbins is a 77 y.o. female who presents today for epidural steroid injection. Patient complains of low back pain radiating into left leg. No red flag symptoms. Consents to proceed with procedure. Allergies   Allergen Reactions    Enbrel [Etanercept]      Swelling    Humira [Adalimumab]      Swelling     Penicillins Swelling and Rash     n/v       No current facility-administered medications for this encounter.         Past Medical History:   Diagnosis Date    Anxiety and depression     Bacteremia 5/26/2019    Chronic back pain     Diverticulitis     GERD (gastroesophageal reflux disease)     Gram-negative bacteremia 5/26/2019    Hypothyroidism     Neuropathy     Obesity     Rheumatoid arthritis involving multiple sites with positive rheumatoid factor ([facility] [address] 9/12/2017    Thyroid disease        Past Surgical History:   Procedure Laterality Date    ANESTHESIA NERVE BLOCK Left 5/5/2020    LEFT L5 TRANSFORAMINAL EPIDURAL STEROID INJECTION performed by Chao Dodge DO at [address] [facility]  02/23/2017    robotic assisted cholecystectomy with IOC    COLONOSCOPY  09/11/2017    NERVE BLOCK Left 05/05/2020    NERVE BLOCK Left 05/05/2020    NERVE BLOCK Left 06/04/2020    transforaminal    OTHER SURGICAL HISTORY  04/11/2017    Laparoscopic Hand Assisted Open Sigmoid Colectomy with Colostomy     OTHER SURGICAL HISTORY  09/12/2017    laparscopic robotic assited colostomy reversed    PAIN MANAGEMENT PROCEDURE Left 6/4/2020    LEFT L4-5 TRANSFORAMINAL EPIDURAL STEROID INJECTION performed by Chao Dodge DO at [address] History   Problem Relation Age of Onset    Diabetes Mother     Breast Cancer Mother     Other Father     Heart Disease Father             ROS:    Constitutional: Denies fevers, chills, night sweats, unintentional weight loss     Skin: Denies rash or skin changes     Respiratory: Denies SOB or cough     Cardiovascular: Denies CP, palpitations, edema      Neurologic: See HPI.     MSK: See HPI. Hematologic/Lymphatic/Immunologic: Denies bruising       Physical Exam:   Blood pressure 138/65, pulse 70, temperature 97.5 °F (36.4 °C), temperature source Skin, resp. rate 16, SpO2 95 %. General: well developed and well nourished in no acute distress  Resp: symmetrical chest expansion, unlabored breathing, respirations unlabored. CV: Heart rate is regular. Peripheral pulses are palpable  Skin: No rashes or ecchymosis. Normal turgor. MSK: ttp lumbar psps     Neurological Exam:  Reflexes 2+ and symmetric.        Impression:     Lumbar radiculitis      Plan:   · Injection as planned today           Juani Kwok DO, [address] [place] [address]   Board Certified Physical Medicine and Rehabilitation

## 2020-07-22 ENCOUNTER — OFFICE VISIT (OUTPATIENT)
Dept: FAMILY MEDICINE CLINIC | Age: 65
End: 2020-07-22
Payer: MEDICARE

## 2020-07-22 VITALS
HEIGHT: 72 IN | SYSTOLIC BLOOD PRESSURE: 124 MMHG | TEMPERATURE: 97 F | WEIGHT: 293 LBS | OXYGEN SATURATION: 98 % | DIASTOLIC BLOOD PRESSURE: 80 MMHG | RESPIRATION RATE: 18 BRPM | BODY MASS INDEX: 39.68 KG/M2 | HEART RATE: 68 BPM

## 2020-07-22 PROBLEM — E03.8 HYPOTHYROIDISM DUE TO HASHIMOTO'S THYROIDITIS: Status: ACTIVE | Noted: 2020-07-22

## 2020-07-22 PROBLEM — E06.3 HYPOTHYROIDISM DUE TO HASHIMOTO'S THYROIDITIS: Status: ACTIVE | Noted: 2020-07-22

## 2020-07-22 PROBLEM — R73.03 PREDIABETES: Status: ACTIVE | Noted: 2020-07-22

## 2020-07-22 PROCEDURE — 90732 PPSV23 VACC 2 YRS+ SUBQ/IM: CPT | Performed by: NURSE PRACTITIONER

## 2020-07-22 PROCEDURE — 99214 OFFICE O/P EST MOD 30 MIN: CPT | Performed by: NURSE PRACTITIONER

## 2020-07-22 PROCEDURE — G0009 ADMIN PNEUMOCOCCAL VACCINE: HCPCS | Performed by: NURSE PRACTITIONER

## 2020-07-22 RX ORDER — LEVOTHYROXINE SODIUM 137 UG/1
TABLET ORAL
Qty: 30 TABLET | Refills: 1 | Status: SHIPPED
Start: 2020-07-22 | End: 2020-08-24 | Stop reason: SDUPTHER

## 2020-07-22 RX ORDER — SERTRALINE HYDROCHLORIDE 100 MG/1
TABLET, FILM COATED ORAL
Qty: 30 TABLET | Refills: 6 | Status: SHIPPED
Start: 2020-07-22 | End: 2021-02-08

## 2020-07-22 RX ORDER — OMEPRAZOLE 40 MG/1
CAPSULE, DELAYED RELEASE ORAL
Qty: 30 CAPSULE | Refills: 6 | Status: SHIPPED
Start: 2020-07-22 | End: 2021-02-08

## 2020-07-22 ASSESSMENT — ENCOUNTER SYMPTOMS
SINUS PAIN: 0
SHORTNESS OF BREATH: 0
TROUBLE SWALLOWING: 0
NAUSEA: 0
COLOR CHANGE: 0
CHEST TIGHTNESS: 0
ABDOMINAL PAIN: 0
FACIAL SWELLING: 0
COUGH: 0
BACK PAIN: 1
DIARRHEA: 0
RHINORRHEA: 0
WHEEZING: 0
SORE THROAT: 0
CONSTIPATION: 0
VOICE CHANGE: 0
VOMITING: 0
SINUS PRESSURE: 0

## 2020-07-22 NOTE — PROGRESS NOTES
OFFICE PROGRESS NOTE  [address]  [address] [id number]  Dept: [phone]   Chief Complaint   Patient presents with    Gastroesophageal Reflux    Hypothyroidism    Weight Management    Discuss Labs         HPI:     Here today for follow up on GERD, hypothyroid, weight reduction and review labs for above. GERD: Patient complains of heartburn. This has been associated with fullness after meals and heartburn. She denies no other symptoms. Symptoms have been present for several years. She denies dysphagia. She has not lost weight. She denies melena, hematochezia, hematemesis, and coffee ground emesis. Medical therapy in the past has included proton pump inhibitors. Hypothyroidism: Patient complains of hypothyroidism. Symptoms include change in energy level. Patient denies diarrhea, heat / cold intolerance, nervousness, palpitations and weight changes. Onset of symptoms was several years ago. Symptoms have gradually worsened.   Lab Results   Component Value Date    TSH 4.530 (H) 06/25/2020    I2IRWSX 15.0 (H) 01/24/2017    T4FREE 1.50 06/25/2020     Component  Value  Ref Range & Units  Status  Collected  Lab    THYROID PEROXIDASE (TPO) ABS  366.0High    0.0 - 9.0 IU/mL  Final  06/25/2020 11:40 AM  [facility]          In review of labs found to have vitamin D insufficiency, also prediabetic,     Lab Results   Component Value Date     06/25/2020    K 4.0 06/25/2020     06/25/2020    CO2 21 (L) 06/25/2020    BUN 18 06/25/2020    CREATININE 0.8 06/25/2020    GLUCOSE 116 (H) 06/25/2020    CALCIUM 9.6 06/25/2020    PROT 7.4 06/25/2020    LABALBU 4.7 06/25/2020    BILITOT 0.6 06/25/2020    ALKPHOS 110 (H) 06/25/2020    AST 35 (H) 06/25/2020    ALT 34 (H) 06/25/2020    LABGLOM >60 06/25/2020    GFRAA >60 06/25/2020       Lab Results   Component Value Date    LABA1C 5.8 (H) 06/25/2020         Lab Results   Component Value Date    CHOL 187 06/25/2020    CHOL 130 05/27/2019    CHOL 216 (H) 08/10/2018     Lab Results   Component Value Date    TRIG 133 06/25/2020    TRIG 179 (H) 05/27/2019    TRIG 139 08/10/2018     Lab Results   Component Value Date    HDL 57 06/25/2020    HDL 22 05/27/2019    HDL 62 08/10/2018     Lab Results   Component Value Date    LDLCALC 103 (H) 06/25/2020    LDLCALC 72 05/27/2019    LDLCALC 126 (H) 08/10/2018     Lab Results   Component Value Date    LABVLDL 27 06/25/2020    LABVLDL 36 05/27/2019    LABVLDL 28 08/10/2018       Lab Results   Component Value Date    WBC 3.6 (L) 06/25/2020    HGB 14.5 06/25/2020    HCT 44.5 06/25/2020    MCV 85.6 06/25/2020     06/25/2020         Vit D, 25-Hydroxy  24Low    30 - 100 ng/mL  Final  06/25/2020 11:40 AM   - [address] [facility]        She has anxiety and depression which is well controlled on Sertraline 100 mg daily. She denies any suicidal or homicidal ideations. No adverse effects. She states she isn't concerned about her weight she is 77years old and has RA so she enjoys her food. Discussed with prediabetes, hashimoto's and RA the increasing weight will be disastrous for her and discussed to gradually cut back on her food intake, eat smaller meals and make the plate colorful. Also recommend a heated pool to continue to exercise with her RA. Current Outpatient Medications:     Upadacitinib ER 15 MG TB24, Take 15 mg by mouth daily, Disp: , Rfl:     omeprazole (PRILOSEC) 40 MG delayed release capsule, take 1 capsule by mouth once daily, Disp: 30 capsule, Rfl: 6    sertraline (ZOLOFT) 100 MG tablet, take 1 tablet by mouth once daily, Disp: 30 tablet, Rfl: 6    levothyroxine (SYNTHROID) 137 MCG tablet, take 1 tablet by mouth once daily, Disp: 30 tablet, Rfl: 1    Cholecalciferol 50 MCG (2000 UT) TABS, Take 1 tablet by mouth daily, Disp: 30 tablet, Rfl: 6    metroNIDAZOLE (METROCREAM) 0.75 % cream, Apply topically 2 times daily. , Disp: 60 g, Rfl: 2   diclofenac-Misoprostol (ARTHROTEC) 50-0.2 MG per tablet, Take 1 tablet by mouth 2 times daily as needed for Pain If you are a women taking this and think you might be pregnant stop taking this medication as it can cause miscarriage. , Disp: 60 tablet, Rfl: 2    acetaminophen (TYLENOL) 500 MG tablet, Take 1 tablet by mouth every 6 hours as needed for Pain, Disp: 120 tablet, Rfl: 2    folic acid (FOLVITE) 1 MG tablet, take 1 tablet by mouth once daily, Disp: 30 tablet, Rfl: 0    topiramate (TOPAMAX) 25 MG tablet, take 1 tablet by mouth twice a day, Disp: 60 tablet, Rfl: 1    tears naturale II (CELLUGEL) SOLN ophthalmic solution, Place 1 drop into the left eye as needed for Dry Eyes, Disp: 1 Bottle, Rfl: 2    methotrexate (RHEUMATREX) 2.5 MG chemo tablet, , Disp: , Rfl:     Multiple Vitamins-Minerals (MULTI-VITAMIN GUMMIES PO), Take 2 tablets by mouth daily, Disp: , Rfl:   Social History     Socioeconomic History    Marital status: Single     Spouse name: None    Number of children: None    Years of education: None    Highest education level: None   Occupational History    None   Social Needs    Financial resource strain: None    Food insecurity     Worry: None     Inability: None    Transportation needs     Medical: None     Non-medical: None   Tobacco Use    Smoking status: Former Smoker     Packs/day: 0.00     Years: 0.00     Pack years: 0.00     Last attempt to quit: 1986     Years since quittin.6    Smokeless tobacco: Never Used    Tobacco comment: quit 20 yrs ago   Substance and Sexual Activity    Alcohol use: No    Drug use: No    Sexual activity: None   Lifestyle    Physical activity     Days per week: None     Minutes per session: None    Stress: None   Relationships    Social connections     Talks on phone: None     Gets together: None     Attends Restorationist service: None     Active member of club or organization: None     Attends meetings of clubs or organizations: None Relationship status: None    Intimate partner violence     Fear of current or ex partner: None     Emotionally abused: None     Physically abused: None     Forced sexual activity: None   Other Topics Concern    None   Social History Narrative    None       I have reviewed Rosalie's allergies, medications, problem list, medical, social and family history and have updated as needed in the electronic medical record    Review of Systems   Constitutional: Negative for activity change, appetite change, chills, diaphoresis, fatigue, fever and unexpected weight change. HENT: Negative for congestion, dental problem, drooling, ear discharge, ear pain, facial swelling, hearing loss, mouth sores, nosebleeds, postnasal drip, rhinorrhea, sinus pressure, sinus pain, sneezing, sore throat, tinnitus, trouble swallowing and voice change. Eyes: Negative for visual disturbance. Respiratory: Negative for cough, chest tightness, shortness of breath and wheezing. Cardiovascular: Negative for chest pain, palpitations and leg swelling. Gastrointestinal: Negative for abdominal pain, constipation, diarrhea, nausea and vomiting. Endocrine: Negative for cold intolerance, heat intolerance, polydipsia, polyphagia and polyuria. Genitourinary: Negative for difficulty urinating, frequency and urgency. Musculoskeletal: Positive for back pain and gait problem. Negative for arthralgias, joint swelling, myalgias, neck pain and neck stiffness. Skin: Negative for color change, pallor, rash and wound. Allergic/Immunologic: Negative for environmental allergies, food allergies and immunocompromised state. Neurological: Negative for dizziness, tremors, seizures, syncope, facial asymmetry, speech difficulty, weakness, light-headedness, numbness and headaches. Hematological: Negative for adenopathy. Does not bruise/bleed easily.    Psychiatric/Behavioral: Negative for agitation, behavioral problems, confusion, decreased concentration, dysphoric mood, hallucinations, self-injury, sleep disturbance and suicidal ideas. The patient is not nervous/anxious and is not hyperactive. OBJECTIVE:     VS:  Wt Readings from Last 3 Encounters:   07/22/20 (!) 340 lb 6 oz (154.4 kg)   06/25/20 (!) 346 lb (156.9 kg)   06/16/20 (!) 339 lb (153.8 kg)                       Vitals:    07/22/20 1539   BP: 124/80   Pulse: 68   Resp: 18   Temp: 97 °F (36.1 °C)   SpO2: 98%   Weight: (!) 340 lb 6 oz (154.4 kg)   Height: 6' (1.829 m)       General: Alert and oriented to person, place, and time, well developed and well nourished, in no acute distress  SKIN: Warm and dry, intact without any rash, masses or lesions  HEAD: normocephalic, atraumatic  Eyes: sclera/conjunctiva clear, PERRLA, EOMI's intact  Neck: supple and non-tender without mass, trachea midline, no cervical lymphadenopathy, no bruit, no thyromegaly or nodules  Cardiovascular: regular rate and regular rhythm, normal S1 and S2,  no murmurs, rubs, clicks, or gallop. Distal pulses intact, no carotid bruits. No edema  Pulmonary/Chest: clear to auscultation bilaterally, no wheezes, rales or rhonchi, normal air movement, no respiratory distress  Abdomen: soft, non-tender, non-distended, normal bowel sounds, no masses or hepatosplenomegaly  Musculoskeletal: Normal ROM, no joint swelling, deformity or tenderness   Neurologic: reflexes normal and symmetric, no cranial nerve deficit, gait, coordination and speech normal  Extremities: no clubbing, cyanosis, or edema. Psychiatric: Good eye contact, normal mood and affect, answers questions appropriately    ASSESSMENT/PLAN   Kin Camargo was seen today for gastroesophageal reflux, hypothyroidism, weight management and discuss labs.     Diagnoses and all orders for this visit:    Gastroesophageal reflux disease without esophagitis  -     omeprazole (PRILOSEC) 40 MG delayed release capsule; take 1 capsule by mouth once daily  -Discussed elevated the HOB 30 degrees  -Discussed limiting spicy, fatty, greasy foods  -Discussed limit caffeine consumption to 1 - 2 cups daily  -Discussed waiting at least 3 - 4 hours before going to bed after eating  -Discussed not to eat and bend over immediately or lift heavy items.  -Discussed weight reduction if needed even 5 - 10 pounds.  -Discussed taking medications 1 hour prior to eating. Hypothyroidism due to Hashimoto's thyroiditis uncontrolled  -     levothyroxine (SYNTHROID) 137 MCG tablet; take 1 tablet by mouth once daily  -     CBC Auto Differential; Future  -     TSH without Reflex; Future  -     T4, Free; Future  Discussed taking same time every day on empty stomach, no vitamins, minerals, calcium or iron for 4 hours of  thyroid medication. Wait 30 - 60 minutes to have breakfast or other medications. Morbid (severe) obesity due to excess calories ([address]   Discussed with prediabetes, hashimoto's and RA the increasing weight will be disastrous for her and discussed to gradually cut back on her food intake, eat smaller meals and make the plate colorful. Also recommend a heated pool to continue to exercise with her RA    Prediabetes New  · Watch your weight. A healthy weight helps your body use insulin properly. · Limit the amount of calories, sweets, and unhealthy fat you eat. Ask your doctor if you should see a dietitian. A registered dietitian can help you create meal plans that fit your lifestyle. · Get at least 30 minutes of exercise on most days of the week. Exercise helps control your blood sugar. It also helps you maintain a healthy weight. Walking is a good choice. You also may want to do other activities, such as running, swimming, cycling, or playing tennis or team sports. · Do not smoke. Smoking can make prediabetes worse. If you need help quitting, talk to your doctor about stop-smoking programs and medicines. These can increase your chances of quitting for good.   · If your doctor prescribed medicines, take them exactly as prescribed. Call your doctor if you think you are having a problem with your medicine. You will get more details on the specific medicines your doctor prescribes. Watch closely for changes in your health, and be sure to contact your doctor   If: you have any symptoms of diabetes which can include being thirsty more often. Urinating more, being hungrier, losing weight, being very tired, having blurry vision. You have a wound that will not heal or an infection that will not go away, you have problems with your blood pressure. Vitamin D deficiency New  -     Cholecalciferol 50 MCG (2000 UT) TABS; Take 1 tablet by mouth daily    Anxiety and depression New  -     sertraline (ZOLOFT) 100 MG tablet; take 1 tablet by mouth once daily    Need for 23-polyvalent pneumococcal polysaccharide vaccine  -     Pneumococcal polysaccharide vaccine 23-valent greater than or equal to 3yo subcutaneous/IM  About half of people who get PPSV have mild side effects, such as redness or pain where the shot is given, which go away within about two days. Less than 1 out of 100 people develop a fever, muscle aches, or more severe local reactions. Acne rosacea  -     metroNIDAZOLE (METROCREAM) 0.75 % cream; Apply topically 2 times daily. Return in about 5 weeks (around 8/26/2020) for medicare well , hypothyroid. Discussed weight loss, Discussed exercising 30 minutes daily and Discussed taking medications as directed and adverse effects        I have reviewed my findings and recommendations with Augie Zheng.     Wale Marquez, NP-C, FNP-BC

## 2020-07-22 NOTE — PATIENT INSTRUCTIONS
Patient Education        Hashimoto's Thyroiditis: Care Instructions  Your Care Instructions     Hashimoto's thyroiditis is a problem with the thyroid gland. The thyroid gland, which is in your neck, controls the way your body uses energy. Sometimes the disease causes the gland to make too much thyroid hormone (thyrotoxicosis). This can make you feel nervous, lose weight, and have many loose bowel movements. You may also have a fast heartbeat. But as the disease progresses, the gland usually does not make enough thyroid hormone. This can cause you to feel tired and have dry skin and thinning hair. Most people with Hashimoto's are diagnosed when they have these symptoms. You may need to take medicine if you have symptoms or if your thyroid hormone level is not normal. Most people with Hashimoto's thyroiditis need to take medicine for the rest of their lives. Follow-up care is a key part of your treatment and safety. Be sure to make and go to all appointments, and call your doctor if you are having problems. It's also a good idea to know your test results and keep a list of the medicines you take. How can you care for yourself at home? · Take your medicines exactly as prescribed. Call your doctor if you think you are having a problem with your medicine. You will get more details on the specific medicines your doctor prescribes. When should you call for help? [id number] anytime you think you may need emergency care. For example, call if:  · You passed out (lost consciousness). · You have severe trouble breathing. · You have a very slow heartbeat (less than 60 beats a minute). · You have a low body temperature (95°F or below). Watch closely for changes in your health, and be sure to contact your doctor if:  · You gain weight even though you are eating normally or less than usual.  · You feel extremely weak or tired. · You have new changes in your skin, nails, or hair, or the changes get worse.   · You notice

## 2020-08-17 RX ORDER — TOPIRAMATE 25 MG/1
TABLET ORAL
Qty: 60 TABLET | Refills: 1 | OUTPATIENT
Start: 2020-08-17

## 2020-08-21 ENCOUNTER — HOSPITAL ENCOUNTER (OUTPATIENT)
Age: 65
Discharge: HOME | End: 2020-08-23
Payer: MEDICARE

## 2020-08-21 LAB
BASOPHILS ABSOLUTE: 0.03 E9/L (ref 0–0.2)
BASOPHILS RELATIVE PERCENT: 0.8 % (ref 0–2)
EOSINOPHILS ABSOLUTE: 0.03 E9/L (ref 0.05–0.5)
EOSINOPHILS RELATIVE PERCENT: 0.8 % (ref 0–6)
HCT VFR BLD CALC: 41.8 % (ref 34–48)
HEMOGLOBIN: 13.7 G/DL (ref 11.5–15.5)
IMMATURE GRANULOCYTES #: 0.02 E9/L
IMMATURE GRANULOCYTES %: 0.5 % (ref 0–5)
LYMPHOCYTES ABSOLUTE: 0.58 E9/L (ref 1.5–4)
LYMPHOCYTES RELATIVE PERCENT: 14.9 % (ref 20–42)
MCH RBC QN AUTO: 28.8 PG (ref 26–35)
MCHC RBC AUTO-ENTMCNC: 32.8 % (ref 32–34.5)
MCV RBC AUTO: 87.8 FL (ref 80–99.9)
MONOCYTES ABSOLUTE: 0.4 E9/L (ref 0.1–0.95)
MONOCYTES RELATIVE PERCENT: 10.3 % (ref 2–12)
NEUTROPHILS ABSOLUTE: 2.83 E9/L (ref 1.8–7.3)
NEUTROPHILS RELATIVE PERCENT: 72.7 % (ref 43–80)
OVALOCYTES: ABNORMAL
PDW BLD-RTO: 14.9 FL (ref 11.5–15)
PLATELET # BLD: 198 E9/L (ref 130–450)
PMV BLD AUTO: 10.6 FL (ref 7–12)
POIKILOCYTES: ABNORMAL
POLYCHROMASIA: ABNORMAL
RBC # BLD: 4.76 E12/L (ref 3.5–5.5)
T4 FREE: 1.9 NG/DL (ref 0.93–1.7)
TSH SERPL DL<=0.05 MIU/L-ACNC: 0.35 UIU/ML (ref 0.27–4.2)
WBC # BLD: 3.9 E9/L (ref 4.5–11.5)

## 2020-08-21 PROCEDURE — 84443 ASSAY THYROID STIM HORMONE: CPT

## 2020-08-21 PROCEDURE — 36415 COLL VENOUS BLD VENIPUNCTURE: CPT

## 2020-08-21 PROCEDURE — 85025 COMPLETE CBC W/AUTO DIFF WBC: CPT

## 2020-08-21 PROCEDURE — 84439 ASSAY OF FREE THYROXINE: CPT

## 2020-08-24 RX ORDER — LEVOTHYROXINE SODIUM 137 UG/1
TABLET ORAL
Qty: 30 TABLET | Refills: 6 | Status: SHIPPED
Start: 2020-08-24 | End: 2021-04-13

## 2020-08-28 NOTE — OP NOTE
LUMBOSACRAL EPIDURAL STEROID INJECTION, TRANSFORAMINAL APPROACH       WITH FLUOROSCOPIC GUIDANCE    Patient: Erin Galvan                         MRN#: [id number]  : 1955   Date of procedure: 2020      Physician Performing Procedure:  Chad Quarles DO    Clinical Scenario: As per electronic documentation. Diagnosis: lumbar radiculitis     Injectate: A total of 3cc, consisting of 1 cc of Dexamethasone 10mg/ml,  with the remainder of normal saline    Levels Treated:  Left L4-5 neural foramen    Approach:   Transforaminal    Improvement after today's procedure: As per nursing record. Comments:  None     Pre-procedural evaluation: The patient was examined today just prior to performing the procedure listed above. The patient's heart rate was normal, lungs were clear to auscultation bilaterally. Procedure: The patient was prepped and draped in a sterile fashion in the prone position after informed consent was signed and all the patient's questions were answered including the risks, benefits, alternative treatment options, and prognosis. The risks include - but are not limited to - infection, allergic reaction, increased pain, lack of therapeutic benefit, steroid reaction, nerve damage, paralysis, stroke, epidural hematoma, syncope, headache, respiratory or cardiac arrest, and scar formation. The C-arm was positioned so that an oblique view of the neural foramen as noted above was visualized. The soft tissues overlying this structure were infiltrated with 2-3 cc. of 1% Lidocaine without Epinephrine. A 22 gauge 5 inch spinal needle was inserted toward the target using a trajectory view along the fluoroscope beam.  Under AP and lateral visualization, the needle was advanced so it did not puncture dura. Biplanar projections were used to confirm position. Aspiration was confirmed to be negative for CSF and/or blood. A 1-2 cc. volume of Isovue contrast was injected at this level. The contrast was observed to flow under the pedicle. Radiographs were obtained for documentation purposes. After attaining flow of contrast documented above, the above injectate was administered into the transforaminal epidural space. The patient tolerated the procedure well and was discharged after an appropriate period of observation. If there are any complications, the patient was instructed to call us. The patient is to follow-up with the requesting physician after the injection, preferably within three weeks. The procedure was performed independently

## 2020-11-11 ENCOUNTER — TELEPHONE (OUTPATIENT)
Dept: FAMILY MEDICINE CLINIC | Age: 65
End: 2020-11-11

## 2021-02-11 ENCOUNTER — IMMUNIZATION (OUTPATIENT)
Dept: PRIMARY CARE CLINIC | Age: 66
End: 2021-02-11
Payer: MEDICARE

## 2021-02-11 PROCEDURE — 91301 COVID-19, MODERNA VACCINE 100MCG/0.5ML DOSE: CPT | Performed by: NURSE PRACTITIONER

## 2021-02-11 PROCEDURE — 0011A PR IMM ADMN SARSCOV2 100 MCG/0.5 ML 1ST DOSE: CPT | Performed by: NURSE PRACTITIONER

## 2021-02-15 LAB
ALBUMIN SERPL-MCNC: 4.6 G/DL
ALP BLD-CCNC: 103 U/L
ALT SERPL-CCNC: 38 U/L
ANION GAP SERPL CALCULATED.3IONS-SCNC: NORMAL MMOL/L
AST SERPL-CCNC: 33 U/L
BASOPHILS ABSOLUTE: 32 /ΜL
BASOPHILS ABSOLUTE: NORMAL
BASOPHILS RELATIVE PERCENT: 0.6 %
BASOPHILS RELATIVE PERCENT: NORMAL
BILIRUB SERPL-MCNC: 0.7 MG/DL (ref 0.1–1.4)
BUN BLDV-MCNC: NORMAL MG/DL
CALCIUM SERPL-MCNC: 9.6 MG/DL
CHLORIDE BLD-SCNC: 106 MMOL/L
CO2: 29 MMOL/L
CREAT SERPL-MCNC: NORMAL MG/DL
EOSINOPHILS ABSOLUTE: 48 /ΜL
EOSINOPHILS ABSOLUTE: NORMAL
EOSINOPHILS RELATIVE PERCENT: 0.9 %
EOSINOPHILS RELATIVE PERCENT: NORMAL
GFR CALCULATED: 75
GLUCOSE BLD-MCNC: 99 MG/DL
HCT VFR BLD CALC: 39.6 % (ref 36–46)
HCT VFR BLD CALC: NORMAL %
HEMOGLOBIN: 13.6 G/DL (ref 12–16)
HEMOGLOBIN: NORMAL
LYMPHOCYTES ABSOLUTE: 551 /ΜL
LYMPHOCYTES ABSOLUTE: NORMAL
LYMPHOCYTES RELATIVE PERCENT: 10.4 %
LYMPHOCYTES RELATIVE PERCENT: NORMAL
MCH RBC QN AUTO: 30 PG
MCH RBC QN AUTO: NORMAL PG
MCHC RBC AUTO-ENTMCNC: 34.3 G/DL
MCHC RBC AUTO-ENTMCNC: NORMAL G/DL
MCV RBC AUTO: 87.4 FL
MCV RBC AUTO: NORMAL FL
MONOCYTES ABSOLUTE: 440 /ΜL
MONOCYTES ABSOLUTE: NORMAL
MONOCYTES RELATIVE PERCENT: 8.3 %
MONOCYTES RELATIVE PERCENT: NORMAL
NEUTROPHILS ABSOLUTE: 4229 /ΜL
NEUTROPHILS ABSOLUTE: NORMAL
NEUTROPHILS RELATIVE PERCENT: 79.8 %
NEUTROPHILS RELATIVE PERCENT: NORMAL
PLATELET # BLD: 221 K/ΜL
PLATELET # BLD: NORMAL 10*3/UL
PMV BLD AUTO: 10.4 FL
PMV BLD AUTO: NORMAL FL
POTASSIUM SERPL-SCNC: 4.3 MMOL/L
RBC # BLD: 4.53 10^6/ΜL
RBC # BLD: NORMAL 10*6/UL
SODIUM BLD-SCNC: 143 MMOL/L
TOTAL PROTEIN: 6.8
WBC # BLD: 5.3 10^3/ML
WBC # BLD: NORMAL 10*3/UL

## 2021-03-08 DIAGNOSIS — F32.A ANXIETY AND DEPRESSION: Chronic | ICD-10-CM

## 2021-03-08 DIAGNOSIS — K21.9 GASTROESOPHAGEAL REFLUX DISEASE WITHOUT ESOPHAGITIS: Chronic | ICD-10-CM

## 2021-03-08 DIAGNOSIS — F41.9 ANXIETY AND DEPRESSION: Chronic | ICD-10-CM

## 2021-03-09 RX ORDER — SERTRALINE HYDROCHLORIDE 100 MG/1
TABLET, FILM COATED ORAL
Qty: 30 TABLET | Refills: 0 | Status: SHIPPED
Start: 2021-03-09 | End: 2021-03-15 | Stop reason: SDUPTHER

## 2021-03-09 RX ORDER — OMEPRAZOLE 40 MG/1
CAPSULE, DELAYED RELEASE ORAL
Qty: 30 CAPSULE | Refills: 0 | Status: SHIPPED
Start: 2021-03-09 | End: 2021-03-15 | Stop reason: SDUPTHER

## 2021-03-11 ENCOUNTER — IMMUNIZATION (OUTPATIENT)
Dept: PRIMARY CARE CLINIC | Age: 66
End: 2021-03-11
Payer: MEDICARE

## 2021-03-11 PROCEDURE — 91301 COVID-19, MODERNA VACCINE 100MCG/0.5ML DOSE: CPT | Performed by: NURSE PRACTITIONER

## 2021-03-11 PROCEDURE — 0012A COVID-19, MODERNA VACCINE 100MCG/0.5ML DOSE: CPT | Performed by: NURSE PRACTITIONER

## 2021-03-15 ENCOUNTER — OFFICE VISIT (OUTPATIENT)
Dept: FAMILY MEDICINE CLINIC | Age: 66
End: 2021-03-15
Payer: MEDICARE

## 2021-03-15 VITALS
DIASTOLIC BLOOD PRESSURE: 71 MMHG | OXYGEN SATURATION: 98 % | HEIGHT: 72 IN | HEART RATE: 67 BPM | BODY MASS INDEX: 39.68 KG/M2 | SYSTOLIC BLOOD PRESSURE: 132 MMHG | TEMPERATURE: 96.8 F | RESPIRATION RATE: 16 BRPM | WEIGHT: 293 LBS

## 2021-03-15 DIAGNOSIS — R73.03 PREDIABETES: ICD-10-CM

## 2021-03-15 DIAGNOSIS — E03.8 HYPOTHYROIDISM DUE TO HASHIMOTO'S THYROIDITIS: ICD-10-CM

## 2021-03-15 DIAGNOSIS — E55.9 VITAMIN D DEFICIENCY: Chronic | ICD-10-CM

## 2021-03-15 DIAGNOSIS — J34.89 NASAL DRYNESS: ICD-10-CM

## 2021-03-15 DIAGNOSIS — E06.3 HYPOTHYROIDISM DUE TO HASHIMOTO'S THYROIDITIS: ICD-10-CM

## 2021-03-15 DIAGNOSIS — K21.9 GASTROESOPHAGEAL REFLUX DISEASE WITHOUT ESOPHAGITIS: Chronic | ICD-10-CM

## 2021-03-15 DIAGNOSIS — N32.81 OAB (OVERACTIVE BLADDER): ICD-10-CM

## 2021-03-15 DIAGNOSIS — F32.A ANXIETY AND DEPRESSION: Chronic | ICD-10-CM

## 2021-03-15 DIAGNOSIS — L71.9 ACNE ROSACEA: Primary | ICD-10-CM

## 2021-03-15 DIAGNOSIS — F41.9 ANXIETY AND DEPRESSION: Chronic | ICD-10-CM

## 2021-03-15 DIAGNOSIS — Z12.31 SCREENING MAMMOGRAM, ENCOUNTER FOR: ICD-10-CM

## 2021-03-15 DIAGNOSIS — Z91.81 AT HIGH RISK FOR FALLS: ICD-10-CM

## 2021-03-15 PROCEDURE — 99214 OFFICE O/P EST MOD 30 MIN: CPT | Performed by: NURSE PRACTITIONER

## 2021-03-15 RX ORDER — OMEPRAZOLE 40 MG/1
40 CAPSULE, DELAYED RELEASE ORAL DAILY PRN
Qty: 30 CAPSULE | Refills: 2 | Status: SHIPPED
Start: 2021-03-15 | End: 2021-04-28 | Stop reason: SDUPTHER

## 2021-03-15 RX ORDER — SERTRALINE HYDROCHLORIDE 100 MG/1
100 TABLET, FILM COATED ORAL NIGHTLY
Qty: 30 TABLET | Refills: 3 | Status: SHIPPED
Start: 2021-03-15 | End: 2021-07-13 | Stop reason: SDUPTHER

## 2021-03-15 RX ORDER — OXYBUTYNIN CHLORIDE 5 MG/1
5 TABLET, EXTENDED RELEASE ORAL DAILY
Qty: 30 TABLET | Refills: 3 | Status: SHIPPED
Start: 2021-03-15 | End: 2021-07-06

## 2021-03-15 RX ORDER — FOLIC ACID 1 MG/1
TABLET ORAL
Qty: 30 TABLET | Refills: 6 | Status: SHIPPED | OUTPATIENT
Start: 2021-03-15

## 2021-03-15 ASSESSMENT — ENCOUNTER SYMPTOMS
VOICE CHANGE: 0
CONSTIPATION: 0
FACIAL SWELLING: 0
WHEEZING: 0
BACK PAIN: 1
TROUBLE SWALLOWING: 0
CHEST TIGHTNESS: 0
NAUSEA: 0
COLOR CHANGE: 0
ABDOMINAL PAIN: 0
SORE THROAT: 0
RHINORRHEA: 0
SINUS PRESSURE: 0
SHORTNESS OF BREATH: 0
DIARRHEA: 0
COUGH: 0
SINUS PAIN: 0
VOMITING: 0

## 2021-03-15 NOTE — PROGRESS NOTES
OFFICE PROGRESS NOTE  [address]  [address]  [facility] [address]  Dept: [phone]   Chief Complaint   Patient presents with    Medication Refill     rash on her face, nose is bleeding leftside lip and tongue itches , no bladder control          HPI:     She has rash on her face for for many years. Last year we tried Metrogel but no improvement. She thinks it may be getting worse with the masks but is using disposable masks. She is using Neutrogenia and a red out soap at night. Which has always worked in the past. Has been getting worse since her last visit in July 2020. She is complaining of blood on the tissue every time she blows her nose but not an actual nose bleed. She is complaining of her lower lip, tongue and right ear itching started a couple of months ago. She denies any changes in food, hygiene products, or any food changes or oral intake. She is complaining of urinary incontinence worse at night. When she has to urinate she has to go to the bathroom and if not will leak but if she has to go badly will flood her clothes. Can occur any time day or night. States seems like since she had diverticulitis surgery she has had problems. She denies any UTI symptoms. She drinks water, coffee and gator aide. She is asking for a medication to help. She feels her anxiety and depression are doing well on the sertraline. She denies any adverse effects and denies any suicidal or homicidal ideations. GERD has been stable and is only taking the omeprazole 40 mg every couple of days. Denies any red flag symptoms. Needs refill today.        Current Outpatient Medications:     omeprazole (PRILOSEC) 40 MG delayed release capsule, Take 1 capsule by mouth daily as needed (heartburn), Disp: 30 capsule, Rfl: 2    sertraline (ZOLOFT) 100 MG tablet, Take 1 tablet by mouth nightly, Disp: 30 tablet, Rfl: 3    folic acid (FOLVITE) 1 MG tablet, take 1 patient is not nervous/anxious and is not hyperactive. OBJECTIVE:     VS:  Wt Readings from Last 3 Encounters:   03/15/21 (!) 336 lb (152.4 kg)   10/12/20 (!) 339 lb (153.8 kg)   07/22/20 (!) 340 lb 6 oz (154.4 kg)                       Vitals:    03/15/21 1257   BP: 132/71   Pulse: 67   Resp: 16   Temp: 96.8 °F (36 °C)   TempSrc: Temporal   SpO2: 98%   Weight: (!) 336 lb (152.4 kg)   Height: 6' (1.829 m)       General: Alert and oriented to person, place, and time, well developed and well nourished, morbidly obese, in no acute distress  SKIN: Warm and dry, intact with pustules noted to face with erythema, no abnormal skin on ear, and no facial swelling of lips, or tongue. HEAD: normocephalic, atraumatic  Eyes: sclera/conjunctiva clear, PERRLA, EOMI's intact  ENT: tympanic membranes, external ear and ear canal normal bilaterally, normal hearing, Nose without deformity, nasal mucosa and turbinates normal without polyps   Throat: clear, tongue midline, no  drainage, no masses or lesions noted, good dentition  Neck: supple and non-tender without mass, trachea midline, no cervical lymphadenopathy, no bruit, no thyromegaly or nodules  Cardiovascular: regular rate and regular rhythm, normal S1 and S2,  no murmurs, rubs, clicks, or gallop. Distal pulses intact, no carotid bruits. No edema  Pulmonary/Chest: clear to auscultation bilaterally, no wheezes, rales or rhonchi, normal air movement, no respiratory distress  Abdomen: soft, non-tender, non-distended, normal bowel sounds, no masses or hepatosplenomegaly  Musculoskeletal: Normal ROM,  joint swelling, deformity and  Tenderness, has RA   Neurologic: reflexes normal and symmetric, no cranial nerve deficit, antalgic gait walks with cane, coordination and speech normal  Extremities: no clubbing, cyanosis, or edema.    Psychiatric: Good eye contact, normal mood and affect, answers questions appropriately    ASSESSMENT/PLAN   Claire Arias was seen today for medication refill. Diagnoses and all orders for this visit:    Acne rosacea worsening  -     Amb External Referral To Dermatology  Recommend using only water to wash her face at this time since the Metrogel didn't work. Nasal dryness New  Nasogel OTC to the nose 3 - 4 times a day  Avoid picking or blowing the nose as much as possible. OAB (overactive bladder) New  -     oxybutynin (DITROPAN-XL) 5 MG extended release tablet; Take 1 tablet by mouth daily  Stop using oxybutynin and call your doctor at once if you have:  · severe stomach pain or constipation;  · blurred vision, tunnel vision, eye pain, or seeing halos around lights;  · little or no urination;  · painful or difficult urination; o  · dehydration symptoms --feeling very thirsty or hot, being unable to urinate, heavy sweating, or hot and dry skin;  Side effects such as dry mouth, constipation, and confusion may be more likely in older adults. Common side effects may include:  · dizziness, drowsiness;  · blurred vision;  · dry mouth; or  · diarrhea, constipation. Anxiety and depression stable  -     sertraline (ZOLOFT) 100 MG tablet; Take 1 tablet by mouth nightly  Continue Zoloft and be cautious with taking the ibuprofen as can increase bleeding and serotonin effect. Gastroesophageal reflux disease without esophagitis stable  -     omeprazole (PRILOSEC) 40 MG delayed release capsule; Take 1 capsule by mouth daily as needed (heartburn)  -Discussed elevated the HOB 30 degrees  -Discussed limiting spicy, fatty, greasy foods  -Discussed limit caffeine consumption to 1 - 2 cups daily  -Discussed waiting at least 3 - 4 hours before going to bed after eating  -Discussed not to eat and bend over immediately or lift heavy items.  -Discussed weight reduction if needed even 5 - 10 pounds.  -Discussed taking medications 1 hour prior to eating.      At high risk for falls  On the basis of positive falls risk screening, assessment and plan is as follows: in-office gait and balance testing performed using The Timed Up and Go Test was negative for increased falls risk- no further intervention is currently indicated. Screening mammogram, encounter for  -     ALANNA DIGITAL SCREEN W OR WO CAD BILATERAL; Future    Hypothyroidism due to Hashimoto's thyroiditis  -     Lipid Panel; Future  -     TSH without Reflex; Future  -     T4, Free; Future    Vitamin D deficiency  -     Vitamin D 25 Hydroxy; Future    Prediabetes  -     Hemoglobin A1C; Future    Other orders  -     folic acid (FOLVITE) 1 MG tablet; take 1 tablet by mouth once daily        Reviewed labs: CMP, CBC  Reviewed notes from Rheumatologist  Reviewed radiology mammogram          Return in about 1 month (around 4/15/2021) for medicare well visit. Discussed weight loss, Discussed exercising 30 minutes daily and Discussed taking medications as directed and adverse effects        I have reviewed my findings and recommendations with Lupe Bonilla.     Fabiano Hendrickson, NP-C, FNP-BC

## 2021-03-15 NOTE — PATIENT INSTRUCTIONS
The medication list included in this document is our record of what you are currently taking, including any changes that were made at today's visit.  If you find any differences when compared to your medications at home, or have any questions that were not answered at your visit, please contact the office. Patient Education        Bladder Training: Care Instructions  Your Care Instructions     Bladder training is used to treat urge incontinence and stress incontinence. Urge incontinence means that the need to urinate comes on so fast that you can't get to a toilet in time. Stress incontinence means that you leak urine because of pressure on your bladder. For example, it may happen when you laugh, cough, or lift something heavy. Bladder training can increase how long you can wait before you have to urinate. It can also help your bladder hold more urine. And it can give you better control over the urge to urinate. It is important to remember that bladder training takes a few weeks to a few months to make a difference. You may not see results right away, but don't give up. Follow-up care is a key part of your treatment and safety. Be sure to make and go to all appointments, and call your doctor if you are having problems. It's also a good idea to know your test results and keep a list of the medicines you take. How can you care for yourself at home? Work with your doctor to come up with a bladder training program that is right for you. You may use one or more of the following methods. Delayed urination  · In the beginning, try to keep from urinating for 5 minutes after you first feel the need to go. · While you wait, take deep, slow breaths to relax. Kegel exercises can also help you delay the need to go to the bathroom. · After some practice, when you can easily wait 5 minutes to urinate, try to wait 10 minutes before you urinate.   · Slowly increase the waiting period until you are able to control when you have to urinate. Scheduled urination  · Empty your bladder when you first wake up in the morning. · Schedule times throughout the day when you will urinate. · Start by going to the bathroom every hour, even if you don't need to go. · Slowly increase the time between trips to the bathroom. · When you have found a schedule that works well for you, keep doing it. · If you wake up during the night and have to urinate, do it. Apply your schedule to waking hours only. Kegel exercises  These tighten and strengthen pelvic muscles, which can help you control the flow of urine. To do Kegel exercises:  · Squeeze the same muscles you would use to stop your urine. Your belly and thighs should not move. · Hold the squeeze for 3 seconds, and then relax for 3 seconds. · Start with 3 seconds. Then add 1 second each week until you are able to squeeze for 10 seconds. · Repeat the exercise 10 to 15 times a session. Do three or more sessions a day. When should you call for help? Watch closely for changes in your health, and be sure to contact your doctor if:    · Your incontinence is getting worse.     · You do not get better as expected. Where can you learn more? Go to [email or web address] and sign in to your [employer] account. Enter Y175 in the [facility] box to learn more about \"Bladder Training: Care Instructions. \"     If you do not have an account, please click on the \"Sign Up Now\" link. Current as of: June 29, 2020               Content Version: 12.8  © 2006-2021 Healthwise, Incorporated. Care instructions adapted under license by [facility] [place] ([facility]). If you have questions about a medical condition or this instruction, always ask your healthcare professional. [place] [address] any warranty or liability for your use of this information.          Patient Education        oxybutynin (oral)  Pronunciation:  OX i BULYNNE ti emma  Brand:  Ditropan SLADE  What is the most important will not make the medicine less effective. Store at room temperature away from moisture and heat. What happens if I miss a dose? Take the medicine as soon as you can, but skip the missed dose if it is almost time for your next dose. Do not take two doses at one time. What happens if I overdose? Seek emergency medical attention or call the Poison Help line at 1-587.872.7169. What should I avoid while using oxybutynin? Avoid driving or hazardous activity until you know how this medicine will affect you. Your reactions could be impaired. Avoid becoming overheated or dehydrated during exercise and in hot weather. Oxybutynin can decrease sweating and you may be more prone to heat stroke. Drinking alcohol with this medicine can increase side effects. What are the possible side effects of oxybutynin? Get emergency medical help if you have signs of an allergic reaction: hives; difficult breathing; swelling of your face, lips, tongue, or throat. Stop using oxybutynin and call your doctor at once if you have:  · severe stomach pain or constipation;  · blurred vision, tunnel vision, eye pain, or seeing halos around lights;  · little or no urination;  · painful or difficult urination; o  · dehydration symptoms --feeling very thirsty or hot, being unable to urinate, heavy sweating, or hot and dry skin;  Side effects such as dry mouth, constipation, and confusion may be more likely in older adults. Common side effects may include:  · dizziness, drowsiness;  · blurred vision;  · dry mouth; or  · diarrhea, constipation. This is not a complete list of side effects and others may occur. Call your doctor for medical advice about side effects. You may report side effects to FDA at 7-349-TKM-6981. What other drugs will affect oxybutynin? Using oxybutynin with other drugs that make you drowsy can worsen this effect.  Ask your doctor before using opioid medication, a sleeping pill, a muscle relaxer, or medicine for anxiety patient. [facility] does not assume any responsibility for any aspect of healthcare administered with the aid of information [facility] provides. The information contained herein is not intended to cover all possible uses, directions, precautions, warnings, drug interactions, allergic reactions, or adverse effects. If you have questions about the drugs you are taking, check with your doctor, nurse or pharmacist.  Copyright 9046-7561 [address]: 7.01. Revision date: 9/17/2019. Care instructions adapted under license by [facility] [place] ([facility]). If you have questions about a medical condition or this instruction, always ask your healthcare professional. [place] [address] any warranty or liability for your use of this information.

## 2021-04-15 DIAGNOSIS — R73.03 PREDIABETES: ICD-10-CM

## 2021-04-15 DIAGNOSIS — E55.9 VITAMIN D DEFICIENCY: Chronic | ICD-10-CM

## 2021-04-15 DIAGNOSIS — E03.8 HYPOTHYROIDISM DUE TO HASHIMOTO'S THYROIDITIS: ICD-10-CM

## 2021-04-15 DIAGNOSIS — E06.3 HYPOTHYROIDISM DUE TO HASHIMOTO'S THYROIDITIS: ICD-10-CM

## 2021-04-15 LAB
CHOLESTEROL, TOTAL: 232 MG/DL (ref 0–199)
HBA1C MFR BLD: 5.3 % (ref 4–5.6)
HDLC SERPL-MCNC: 70 MG/DL
LDL CHOLESTEROL CALCULATED: 138 MG/DL (ref 0–99)
T4 FREE: 1.6 NG/DL (ref 0.93–1.7)
TRIGL SERPL-MCNC: 119 MG/DL (ref 0–149)
TSH SERPL DL<=0.05 MIU/L-ACNC: 1.44 UIU/ML (ref 0.27–4.2)
VITAMIN D 25-HYDROXY: 25 NG/ML (ref 30–100)
VLDLC SERPL CALC-MCNC: 24 MG/DL

## 2021-04-21 ASSESSMENT — LIFESTYLE VARIABLES
HOW OFTEN DO YOU HAVE A DRINK CONTAINING ALCOHOL: NEVER
AUDIT TOTAL SCORE: INCOMPLETE
AUDIT-C TOTAL SCORE: INCOMPLETE

## 2021-04-21 ASSESSMENT — PATIENT HEALTH QUESTIONNAIRE - PHQ9
SUM OF ALL RESPONSES TO PHQ9 QUESTIONS 1 & 2: 0
SUM OF ALL RESPONSES TO PHQ QUESTIONS 1-9: 0
2. FEELING DOWN, DEPRESSED OR HOPELESS: 0

## 2021-04-28 ENCOUNTER — OFFICE VISIT (OUTPATIENT)
Dept: FAMILY MEDICINE CLINIC | Age: 66
End: 2021-04-28
Payer: MEDICARE

## 2021-04-28 VITALS
RESPIRATION RATE: 18 BRPM | SYSTOLIC BLOOD PRESSURE: 126 MMHG | HEART RATE: 85 BPM | BODY MASS INDEX: 39.68 KG/M2 | HEIGHT: 72 IN | WEIGHT: 293 LBS | OXYGEN SATURATION: 95 % | DIASTOLIC BLOOD PRESSURE: 86 MMHG | TEMPERATURE: 96.9 F

## 2021-04-28 DIAGNOSIS — Z00.00 ROUTINE GENERAL MEDICAL EXAMINATION AT A HEALTH CARE FACILITY: Primary | ICD-10-CM

## 2021-04-28 DIAGNOSIS — Z23 NEED FOR PROPHYLACTIC VACCINATION AGAINST DIPHTHERIA-TETANUS-PERTUSSIS (DTP): ICD-10-CM

## 2021-04-28 DIAGNOSIS — E03.8 HYPOTHYROIDISM DUE TO HASHIMOTO'S THYROIDITIS: ICD-10-CM

## 2021-04-28 DIAGNOSIS — K21.9 GASTROESOPHAGEAL REFLUX DISEASE WITHOUT ESOPHAGITIS: Chronic | ICD-10-CM

## 2021-04-28 DIAGNOSIS — E06.3 HYPOTHYROIDISM DUE TO HASHIMOTO'S THYROIDITIS: ICD-10-CM

## 2021-04-28 DIAGNOSIS — Z23 NEED FOR PROPHYLACTIC VACCINATION AND INOCULATION AGAINST VARICELLA: ICD-10-CM

## 2021-04-28 PROCEDURE — G0438 PPPS, INITIAL VISIT: HCPCS | Performed by: NURSE PRACTITIONER

## 2021-04-28 RX ORDER — OMEPRAZOLE 40 MG/1
40 CAPSULE, DELAYED RELEASE ORAL DAILY PRN
Qty: 30 CAPSULE | Refills: 4 | Status: SHIPPED
Start: 2021-04-28 | End: 2021-07-13 | Stop reason: SDUPTHER

## 2021-04-28 RX ORDER — LEVOTHYROXINE SODIUM 137 UG/1
137 TABLET ORAL DAILY
Qty: 30 TABLET | Refills: 6 | Status: SHIPPED
Start: 2021-04-28 | End: 2021-07-13 | Stop reason: SDUPTHER

## 2021-04-28 ASSESSMENT — LIFESTYLE VARIABLES: HOW OFTEN DO YOU HAVE A DRINK CONTAINING ALCOHOL: 0

## 2021-04-28 NOTE — PATIENT INSTRUCTIONS
stop dieting, you also may overeat to make up for what you missed. Most people who diet end up gaining back the pounds they lostand more. Remember that healthy bodies come in lots of shapes and sizes. Everyone can get healthier by eating better and being more active. Where can you learn more? Go to [email or web address] and sign in to your [employer] account. Enter 609 1954 in the [employer] box to learn more about \"Learning About Healthy Weight. \"     If you do not have an account, please click on the \"Sign Up Now\" link. Current as of: September 23, 2020               Content Version: 12.8  © 2006-2021 Healthwise, [employer]. Care instructions adapted under license by [facility] ([facility]). If you have questions about a medical condition or this instruction, always ask your healthcare professional. [place] [address] any warranty or liability for your use of this information. Learning About Low-Carbohydrate Diets  What is a low-carbohydrate diet? A low-carbohydrate (or \"low-carb\") diet limits foods and drinks that have carbohydrates. This includes grains, fruits, milk and yogurt, and starchy vegetables like potatoes, beans, and corn. It also avoids foods and drinks that have added sugar. Instead, low-carb diets include foods that are high in protein and fat. Why might you follow a low-carb diet? Low-carb diets may be used for a variety of reasons, such as for weight loss. People who have diabetes may use a low-carb diet to help manage their blood sugar levels. What should you do before you start the diet? Talk to your doctor before you try any diet. This is even more important if you have health problems like kidney disease, heart disease, or diabetes. Your doctor may suggest that you meet with a registered dietitian. A dietitian can help you make an eating plan that works for you. What foods do you eat on a low-carb diet?   On a low-carb diet, you choose download the FREE \"Exercise & Physical Activity: Your Everyday Guide\" from [employer]. Call 0-604.380.8377 or search [employer] online. · You need 5659-5286 mg of calcium and 6565-9631 IU of vitamin D per day. It is possible to meet your calcium requirement with diet alone, but a vitamin D supplement is usually necessary to meet this goal.  · When exposed to the sun, use a sunscreen that protects against both UVA and UVB radiation with an SPF of 30 or greater. Reapply every 2 to 3 hours or after sweating, drying off with a towel, or swimming. · Always wear a seat belt when traveling in a car. Always wear a helmet when riding a bicycle or motorcycle.   · Baby aspirin 81 mg  once a day

## 2021-04-28 NOTE — PROGRESS NOTES
Yes Historical Provider, MD   Multiple Vitamins-Minerals (MULTI-VITAMIN GUMMIES PO) Take 2 tablets by mouth daily Yes Historical Provider, MD   metroNIDAZOLE (METROCREAM) 0.75 % cream Apply 1 Dose topically 2 times daily  Historical Provider, MD         Past Medical History:   Diagnosis Date    Anxiety and depression     Bacteremia 5/26/2019    Chronic back pain     Diverticulitis     GERD (gastroesophageal reflux disease)     Gram-negative bacteremia 5/26/2019    Hypothyroidism     Neuropathy     Obesity     Rheumatoid arthritis involving multiple sites with positive rheumatoid factor ([address] 9/12/2017    Thyroid disease        Past Surgical History:   Procedure Laterality Date    ANESTHESIA NERVE BLOCK Left 5/5/2020    LEFT L5 TRANSFORAMINAL EPIDURAL STEROID INJECTION performed by Saturnino Falcon DO at [address] [facility], [address]  02/23/2017    robotic assisted cholecystectomy with IOC    COLONOSCOPY  09/11/2017    NERVE BLOCK Left 05/05/2020    NERVE BLOCK Left 05/05/2020    NERVE BLOCK Left 06/04/2020    transforaminal    NERVE BLOCK Left 07/14/2020    L4-5 transforaminal    OTHER SURGICAL HISTORY  04/11/2017    Laparoscopic Hand Assisted Open Sigmoid Colectomy with Colostomy     OTHER SURGICAL HISTORY  09/12/2017    laparscopic robotic assited colostomy reversed    PAIN MANAGEMENT PROCEDURE Left 6/4/2020    LEFT L4-5 TRANSFORAMINAL EPIDURAL STEROID INJECTION performed by Saturnino Falcon DO at [address] [place] [address] Left 7/14/2020    LEFT L4-5 TRANSFORAMINAL EPIDURAL STEROID INJECTION performed by Saturnion Falcon DO at [facility]           Family History   Problem Relation Age of Onset    Diabetes Mother     Breast Cancer Mother     Other Father     Heart Disease Father        CareTeam (Including outside providers/suppliers regularly involved in providing care):   Patient Care Team:  AMBREEN Keita CNP as PCP - General (Family Nurse Practitioner)  AMBREEN Keita CNP as PCP - [facility]    Wt Readings from Last 3 Encounters:   04/28/21 (!) 350 lb 6.4 oz (158.9 kg)   03/15/21 (!) 336 lb (152.4 kg)   10/12/20 (!) 339 lb (153.8 kg)     Vitals:    04/28/21 1447   BP: 126/86   Pulse: 85   Resp: 18   Temp: 96.9 °F (36.1 °C)   SpO2: 95%   Weight: (!) 350 lb 6.4 oz (158.9 kg)   Height: 6' (1.829 m)     Body mass index is 47.52 kg/m². Based upon direct observation of the patient, evaluation of cognition reveals recent and remote memory intact.     General Appearance: alert and oriented to person, place and time, well developed and well- nourished, morbidly obese,  in no acute distress  Skin: warm and dry, no rash or erythema  Head: normocephalic and atraumatic  Eyes: pupils equal, round, and reactive to light, extraocular eye movements intact, conjunctivae normal  ENT: tympanic membrane, external ear and ear canal normal bilaterally, nose without deformity, nasal mucosa and turbinates normal without polyps  Neck: supple and non-tender without mass, no thyromegaly or thyroid nodules, no cervical lymphadenopathy  Pulmonary/Chest: clear but very diminished to auscultation bilaterally- no wheezes, rales or rhonchi, diminished air movement, no respiratory distress  Cardiovascular: normal rate, regular rhythm, normal S1 and S2, no murmurs, rubs, clicks, or gallops, distal pulses intact, no carotid bruits  Abdomen: soft, non-tender, non-distended, normal bowel sounds, no masses or organomegaly  Breast: declines exam.   Extremities: no cyanosis, clubbing or edema  Musculoskeletal: Rheumatoid arthritis, limited range of motion in back and hips, no joint swelling, Kyphosis thoracic spine deformity   Neurologic: reflexes normal and symmetric, no cranial nerve deficit, antalgic gait, coordination and speech normal    Patient's complete Health Risk Assessment and screening values have been reviewed and are found in [facility]. The following problems were reviewed today and where indicated follow up appointments were made and/or referrals ordered. Positive Risk Factor Screenings with Interventions:            General Health and ACP:  General  In general, how would you say your health is?: Fair  In the past 7 days, have you experienced any of the following? New or Increased Pain, New or Increased Fatigue, Loneliness, Social Isolation, Stress or Anger?: None of These  Do you get the social and emotional support that you need?: Yes  Do you have a Living Will?: (!) No  Advance Directives     Power of [facility] Will ACP-Advance Directive ACP-Power of     Not on File Not on File Not on File Not on File      General Health Risk Interventions:  · No Living Will: Advance Care Planning addressed with patient today    Health Habits/Nutrition:  Health Habits/Nutrition  Do you exercise for at least 20 minutes 2-3 times per week?: (!) No  Have you lost any weight without trying in the past 3 months?: No  Do you eat only one meal per day?: No  Have you seen the dentist within the past year?: (!) No  Body mass index: (!) 47.52  Health Habits/Nutrition Interventions:  · Inadequate physical activity:  patient agrees to increase physical activity as follows: working on trying losing weight and is trying to increase her activity but hard due to rheumatoid arthritis  · Dental exam overdue:  patient encouraged to make appointment with his/her dentist  ·       ADL:  ADLs  In the past 7 days, did you need help from others to perform any of the following everyday activities? Eating, dressing, grooming, bathing, toileting, or walking/balance?: None  In the past 7 days, did you need help from others to take care of any of the following?  Laundry, housekeeping, banking/finances, shopping, telephone use, food preparation, transportation, or taking medications?: [employer], Housekeeping  ADL Interventions:  · daughter does the laundry and cleans the house    Personalized Preventive Plan   Current Health Maintenance Status  Immunization History   Administered Date(s) Administered    COVID-19, Moderna, PF, 100mcg/0.5mL 02/11/2021, 03/11/2021    Pneumococcal Conjugate 13-valent ([id number]) 03/01/2019    Pneumococcal Polysaccharide ([id number]) 07/22/2020    Zoster Live (Zostavax) 01/25/2016        Health Maintenance   Topic Date Due    DTaP/Tdap/Td vaccine (1 - Tdap) Never done    Shingles Vaccine (2 of 3) 03/21/2016    Annual Wellness Visit (AWV)  Never done    Breast cancer screen  02/05/2021    Flu vaccine (Season Ended) 04/12/2023 (Originally 9/1/2021)    A1C test (Diabetic or Prediabetic)  04/15/2022    TSH testing  04/15/2022    Lipid screen  04/15/2026    Colon cancer screen colonoscopy  09/11/2027    DEXA (modify frequency per FRAX score)  Completed    Pneumococcal 65+ years Vaccine  Completed    COVID-19 Vaccine  Completed    Hepatitis C screen  Completed    Hepatitis A vaccine  Aged Out    Hepatitis B vaccine  Aged Out    Hib vaccine  Aged Out    Meningococcal (ACWY) vaccine  Aged Out     Recommendations for [email or web address] Due: see orders and patient instructions/AVS.  . Recommended screening schedule for the next 5-10 years is provided to the patient in written form: see Patient Fred Kulkarni was seen today for medicare awv. Diagnoses and all orders for this visit:    Routine general medical examination at a health care facility    Need for prophylactic vaccination against diphtheria-tetanus-pertussis (DTP)  -     Tdap (ADACEL) 5-2-15.5 LF-MCG/0.5 injection; Inject 0.5 mLs into the muscle once for 1 dose  Common side effects of Td vaccination include soreness in the arm where you got the shot and a mild fever.  These usually occur within 3 days of the shot and last a short time    Need for prophylactic vaccination and inoculation against varicella  - zoster recombinant adjuvanted vaccine [facility]) 50 MCG/0.5ML SUSR injection; Inject 0.5 mLs into the muscle once for 1 dose  Get the shingrex in June. Advance Care Planning   Advanced Care Planning: Discussed the patients choices for care and treatment in case of a health event that adversely affects decision-making abilities. Also discussed the patients long-term treatment options. Reviewed with the patient the [address] [place] [address] of [address] [place] [address] Declaration forms  Reviewed the process of designating a competent adult as an Agent (or -in-fact) that could take make health care decisions for the patient if incompetent. Patient was asked to complete the declaration forms, either acknowledge the forms by a public notary or an eligible witness and provide a signed copy to the practice office. Time spent (minutes): 10    Obesity Counseling: Assessed behavioral health risks and factors affecting choice of behavior. Suggested weight control approaches, including dietary changes behavioral modification and follow up plan. Provided educational and support documentation. Time spent (minutes): 10  Cardiovascular Disease Risk Counseling: Assessed the patient's risk to develop cardiovascular disease and reviewed main risk factors. Reviewed steps to reduce disease risk including:   · Quitting tobacco use, reducing amount smoked, or not starting the habit  · Making healthy food choices  · Being physically active and gradualy increasing activity levels   · Reduce weight and determine a healthy BMI goal  · Monitor blood pressure and treat if higher than 140/90 mmHg  · Maintain blood total cholesterol levels under 5 mmol/l or 190 mg/dl  · Maintain LDL cholesterol levels under 3.0 mmol/l or 115 mg/dl   · Control blood glucose levels  · Consider taking aspirin (75 mg daily), once blood pressure is controlled   Provided a follow up plan.   Time spent (minutes): 5

## 2021-05-13 ENCOUNTER — HOSPITAL ENCOUNTER (OUTPATIENT)
Dept: MAMMOGRAPHY | Age: 66
Discharge: HOME | End: 2021-05-15
Payer: MEDICARE

## 2021-05-13 DIAGNOSIS — Z12.31 SCREENING MAMMOGRAM, ENCOUNTER FOR: ICD-10-CM

## 2021-05-13 PROCEDURE — 77063 BREAST TOMOSYNTHESIS BI: CPT

## 2021-05-19 ENCOUNTER — TELEPHONE (OUTPATIENT)
Dept: FAMILY MEDICINE CLINIC | Age: 66
End: 2021-05-19

## 2021-05-19 NOTE — TELEPHONE ENCOUNTER
[facility] radiology and tech said not read yet was glitch receiving the films. Also spoke with [facility] in [facility] mammogram department and suggested call to [facility] [phone] as still in process.  Await call from [facility]

## 2021-05-25 LAB — GLUCOSE BLD-MCNC: 101 MG/DL

## 2021-07-04 DIAGNOSIS — N32.81 OAB (OVERACTIVE BLADDER): ICD-10-CM

## 2021-07-06 RX ORDER — OXYBUTYNIN CHLORIDE 5 MG/1
TABLET, EXTENDED RELEASE ORAL
Qty: 30 TABLET | Refills: 0 | Status: SHIPPED
Start: 2021-07-06 | End: 2021-08-11

## 2021-07-13 ENCOUNTER — OFFICE VISIT (OUTPATIENT)
Dept: FAMILY MEDICINE CLINIC | Age: 66
End: 2021-07-13
Payer: MEDICARE

## 2021-07-13 VITALS
RESPIRATION RATE: 20 BRPM | HEART RATE: 74 BPM | WEIGHT: 293 LBS | SYSTOLIC BLOOD PRESSURE: 128 MMHG | TEMPERATURE: 96.9 F | BODY MASS INDEX: 39.68 KG/M2 | OXYGEN SATURATION: 95 % | DIASTOLIC BLOOD PRESSURE: 70 MMHG | HEIGHT: 72 IN

## 2021-07-13 DIAGNOSIS — K21.9 GASTROESOPHAGEAL REFLUX DISEASE WITHOUT ESOPHAGITIS: Chronic | ICD-10-CM

## 2021-07-13 DIAGNOSIS — M79.661 PAIN IN RIGHT LOWER LEG: Primary | ICD-10-CM

## 2021-07-13 DIAGNOSIS — F41.9 ANXIETY AND DEPRESSION: Chronic | ICD-10-CM

## 2021-07-13 DIAGNOSIS — F32.A ANXIETY AND DEPRESSION: Chronic | ICD-10-CM

## 2021-07-13 DIAGNOSIS — E03.8 HYPOTHYROIDISM DUE TO HASHIMOTO'S THYROIDITIS: ICD-10-CM

## 2021-07-13 DIAGNOSIS — E06.3 HYPOTHYROIDISM DUE TO HASHIMOTO'S THYROIDITIS: ICD-10-CM

## 2021-07-13 DIAGNOSIS — M05.79 RHEUMATOID ARTHRITIS INVOLVING MULTIPLE SITES WITH POSITIVE RHEUMATOID FACTOR (HCC): ICD-10-CM

## 2021-07-13 PROCEDURE — 99214 OFFICE O/P EST MOD 30 MIN: CPT | Performed by: NURSE PRACTITIONER

## 2021-07-13 RX ORDER — OMEPRAZOLE 40 MG/1
40 CAPSULE, DELAYED RELEASE ORAL DAILY PRN
Qty: 90 CAPSULE | Refills: 1 | Status: SHIPPED
Start: 2021-07-13 | End: 2021-11-30 | Stop reason: SDUPTHER

## 2021-07-13 RX ORDER — SERTRALINE HYDROCHLORIDE 100 MG/1
100 TABLET, FILM COATED ORAL NIGHTLY
Qty: 90 TABLET | Refills: 1 | Status: SHIPPED
Start: 2021-07-13 | End: 2021-11-30 | Stop reason: SDUPTHER

## 2021-07-13 RX ORDER — DOXYCYCLINE 100 MG/1
CAPSULE ORAL
COMMUNITY
Start: 2021-06-28 | End: 2021-11-30

## 2021-07-13 RX ORDER — VALACYCLOVIR HYDROCHLORIDE 1 G/1
TABLET, FILM COATED ORAL
COMMUNITY
Start: 2021-06-28 | End: 2021-07-13

## 2021-07-13 RX ORDER — LEVOTHYROXINE SODIUM 137 UG/1
137 TABLET ORAL DAILY
Qty: 90 TABLET | Refills: 1 | Status: SHIPPED
Start: 2021-07-13 | End: 2021-11-30 | Stop reason: SDUPTHER

## 2021-07-13 SDOH — ECONOMIC STABILITY: HOUSING INSECURITY
IN THE LAST 12 MONTHS, WAS THERE A TIME WHEN YOU DID NOT HAVE A STEADY PLACE TO SLEEP OR SLEPT IN A SHELTER (INCLUDING NOW)?: NO

## 2021-07-13 SDOH — ECONOMIC STABILITY: FOOD INSECURITY: WITHIN THE PAST 12 MONTHS, THE FOOD YOU BOUGHT JUST DIDN'T LAST AND YOU DIDN'T HAVE MONEY TO GET MORE.: NEVER TRUE

## 2021-07-13 SDOH — ECONOMIC STABILITY: TRANSPORTATION INSECURITY
IN THE PAST 12 MONTHS, HAS LACK OF TRANSPORTATION KEPT YOU FROM MEETINGS, WORK, OR FROM GETTING THINGS NEEDED FOR DAILY LIVING?: NO

## 2021-07-13 SDOH — ECONOMIC STABILITY: TRANSPORTATION INSECURITY
IN THE PAST 12 MONTHS, HAS THE LACK OF TRANSPORTATION KEPT YOU FROM MEDICAL APPOINTMENTS OR FROM GETTING MEDICATIONS?: NO

## 2021-07-13 SDOH — ECONOMIC STABILITY: FOOD INSECURITY: WITHIN THE PAST 12 MONTHS, YOU WORRIED THAT YOUR FOOD WOULD RUN OUT BEFORE YOU GOT MONEY TO BUY MORE.: NEVER TRUE

## 2021-07-13 SDOH — ECONOMIC STABILITY: INCOME INSECURITY: IN THE LAST 12 MONTHS, WAS THERE A TIME WHEN YOU WERE NOT ABLE TO PAY THE MORTGAGE OR RENT ON TIME?: NO

## 2021-07-13 ASSESSMENT — ENCOUNTER SYMPTOMS
COUGH: 0
RHINORRHEA: 0
CHEST TIGHTNESS: 0
NAUSEA: 0
SINUS PAIN: 0
SHORTNESS OF BREATH: 0
COLOR CHANGE: 0
BACK PAIN: 1
DIARRHEA: 0
WHEEZING: 0
ABDOMINAL PAIN: 0
VOMITING: 0
TROUBLE SWALLOWING: 0
CONSTIPATION: 0
FACIAL SWELLING: 0
VOICE CHANGE: 0
SINUS PRESSURE: 0
SORE THROAT: 0

## 2021-07-13 ASSESSMENT — LIFESTYLE VARIABLES: HOW OFTEN DO YOU HAVE A DRINK CONTAINING ALCOHOL: NEVER

## 2021-07-13 ASSESSMENT — SOCIAL DETERMINANTS OF HEALTH (SDOH): HOW HARD IS IT FOR YOU TO PAY FOR THE VERY BASICS LIKE FOOD, HOUSING, MEDICAL CARE, AND HEATING?: NOT HARD AT ALL

## 2021-07-13 NOTE — PROGRESS NOTES
(heartburn), Disp-90 capsule, R-1Note increase in doseNormal  4. Anxiety and depression  -     sertraline (ZOLOFT) 100 MG tablet; Take 1 tablet by mouth nightly, Disp-90 tablet, R-1Normal  5. Rheumatoid arthritis involving multiple sites with positive rheumatoid factor ([facility] [address]  Assessment & Plan:   Monitored by specialist- no acute findings meriting change in the plan       Orders:  -     Handicap Placard MISC; Starting Tue 7/13/2021, Disp-1 each, R-0, PrintStart 7/13/2021 and expires 7/12/2026       Reviewed labs: Lipids, TSH, FT4, vitamin D 4/15/21 unremarkable    Reviewed radiology Lumbar MRI 3/6/2020    Discussed weight loss, Discussed exercising 30 minutes daily and Discussed taking medications as directed and adverse effects    Return in about 1 month (around 8/13/2021) for leg and back. HPI:   Here today with complaints of right lower leg pain stating it starts at the ankle and moves up the back of the leg up to the knee. Started 2 weeks ago but now has gotten worse over the last week. She has been taking ibuprofen 800 mg 4 tablets daily. She is also on Methotrexate and Renvoq for her RA. She denies any recent injury. She has history of lower back problems for several years and has had several epidural injections. MRI lumbar spine reviewed. Reading location:  [facility]       INDICATION: Lower extremity paresthesia, M 54.17       FINDINGS: Multiplanar, multisequence MR images of the lumbar spine.       Lumbar vertebral height intact. No evidence of acute osseous signal   abnormality. Degenerative disc signal with disc space narrowing most   severe L4-5 and L5-S1. Conus medullaris at L1. Nerve roots normally   distributed within the thecal sac.       T12-L1, small disc bulge without significant central spinal canal or   foraminal stenosis. Similar findings L1-2 and L2-3.       L3-4, disc bulge. Facet arthritis with effusions. No significant   central spinal canal stenosis. Mild left neural foraminal stenosis.      L4-5, disc extrusion with inferior migration of disc fragment. Facet   arthritis and ligaments hypertrophy. Mild to moderate central spinal   canal stenosis. Moderate neural foraminal stenosis.       L5-S1, facet arthritis. Broad-based disc/osteophyte complex. No   significant central spinal canal stenosis. Mild neural foraminal   stenosis.         Impression   L4-5 disc extrusion as discussed. Hypothyroidism: Patient complains of hypothyroidism. Symptoms include none. Patient denies change in energy level, diarrhea, heat / cold intolerance, nervousness, palpitations and weight changes. Onset of symptoms was several years ago. Symptoms have stabilized. GERD has been stable and only takes the medication as she needs it. Denies any red flag symptoms. Needs refill today. Depression and anxiety has been well controlled on the Zoloft. She denies any suicidal or homicidal ideations. Denies any adverse effects from the medication.         Current Outpatient Medications:     doxycycline monohydrate (MONODOX) 100 MG capsule, take 1 capsule by mouth twice a day, Disp: , Rfl:     sertraline (ZOLOFT) 100 MG tablet, Take 1 tablet by mouth nightly, Disp: 90 tablet, Rfl: 1    omeprazole (PRILOSEC) 40 MG delayed release capsule, Take 1 capsule by mouth daily as needed (heartburn), Disp: 90 capsule, Rfl: 1    levothyroxine (SYNTHROID) 137 MCG tablet, Take 1 tablet by mouth Daily, Disp: 90 tablet, Rfl: 1    Handicap Placard MISC, by Does not apply route Start 7/13/2021 and expires 7/12/2026, Disp: 1 each, Rfl: 0    metroNIDAZOLE (METROCREAM) 0.75 % cream, Apply 1 Dose topically 2 times daily, Disp: , Rfl:     folic acid (FOLVITE) 1 MG tablet, take 1 tablet by mouth once daily, Disp: 30 tablet, Rfl: 6    prednisoLONE acetate (PRED FORTE) 1 % ophthalmic suspension, instill 1 drop INTO AFFECTED EYE(S) twice a day, Disp: , Rfl:     Upadacitinib ER 15 MG TB24, Take 15 mg by mouth daily, Disp: , Rfl:    Cholecalciferol 50 MCG (2000) TABS, Take 1 tablet by mouth daily, Disp: 30 tablet, Rfl: 6    methotrexate (RHEUMATREX) 2.5 MG chemo tablet, , Disp: , Rfl:     Multiple Vitamins-Minerals (MULTI-VITAMIN GUMMIES PO), Take 2 tablets by mouth daily, Disp: , Rfl:     oxybutynin (DITROPAN-XL) 5 MG extended release tablet, take 1 tablet by mouth once daily (Patient not taking: Reported on 2021), Disp: 30 tablet, Rfl: 0  Social History     Socioeconomic History    Marital status: Single     Spouse name: None    Number of children: None    Years of education: None    Highest education level: None   Occupational History    None   Tobacco Use    Smoking status: Former Smoker     Packs/day: 0.00     Years: 0.00     Pack years: 0.00     Quit date: 1986     Years since quittin.5    Smokeless tobacco: Never Used    Tobacco comment: quit 20 yrs ago   Vaping Use    Vaping Use: Never used   Substance and Sexual Activity    Alcohol use: No    Drug use: No    Sexual activity: None   Other Topics Concern    None   Social History Narrative    None     Social Determinants of Health     Financial Resource Strain: Low Risk     Difficulty of Paying Living Expenses: Not hard at all   Food Insecurity: No Food Insecurity    Worried About Running Out of Food in the Last Year: Never true    [place] of Food in the Last Year: Never true   Transportation Needs: No Transportation Needs    Lack of Transportation (Medical): No    Lack of Transportation (Non-Medical):  No   Physical Activity:     Days of Exercise per Week:     Minutes of Exercise per Session:    Stress:     Feeling of Stress :    Social Connections:     Frequency of Communication with Friends and Family:     Frequency of Social Gatherings with Friends and Family:     Attends Jehov[employer]'s witness Services:     Active Member of Clubs or Organizations:     Attends Club or Organization Meetings:     Marital Status:    Intimate Partner Violence:     Fear of Current or Ex-Partner:     Emotionally Abused:     Physically Abused:     Sexually Abused:        I have reviewed Rosalie's allergies, medications, problem list, medical, social and family history and have updated as needed in the electronic medical record    Review of Systems   Constitutional: Negative for activity change, appetite change, chills, diaphoresis, fatigue, fever and unexpected weight change. HENT: Negative for congestion, dental problem, drooling, ear discharge, ear pain, facial swelling, hearing loss, mouth sores, nosebleeds, postnasal drip, rhinorrhea, sinus pressure, sinus pain, sneezing, sore throat, tinnitus, trouble swallowing and voice change. Eyes: Negative for visual disturbance. Respiratory: Negative for cough, chest tightness, shortness of breath and wheezing. Cardiovascular: Negative for chest pain, palpitations and leg swelling. Gastrointestinal: Negative for abdominal pain, constipation, diarrhea, nausea and vomiting. Endocrine: Negative for cold intolerance, heat intolerance, polydipsia, polyphagia and polyuria. Genitourinary: Negative for difficulty urinating, frequency and urgency. Musculoskeletal: Positive for back pain and gait problem. Negative for arthralgias, joint swelling, myalgias, neck pain and neck stiffness. Skin: Negative for color change, pallor, rash and wound. Allergic/Immunologic: Negative for environmental allergies, food allergies and immunocompromised state. Neurological: Negative for dizziness, tremors, seizures, syncope, facial asymmetry, speech difficulty, weakness, light-headedness, numbness and headaches. Hematological: Negative for adenopathy. Does not bruise/bleed easily. Psychiatric/Behavioral: Negative for agitation, behavioral problems, confusion, decreased concentration, dysphoric mood, hallucinations, self-injury, sleep disturbance and suicidal ideas. The patient is not nervous/anxious and is not hyperactive. OBJECTIVE:     VS:  Wt Readings from Last 3 Encounters:   07/13/21 (!) 357 lb 8 oz (162.2 kg)   04/28/21 (!) 350 lb 6.4 oz (158.9 kg)   03/15/21 (!) 336 lb (152.4 kg)                       Vitals:    07/13/21 1336 07/13/21 1343 07/13/21 1419   BP: (!) 142/88 (!) 140/88 128/70   Site:   Left Upper Arm   Position:   Sitting   Cuff Size:   Large Adult   Pulse: 74     Resp: 20     Temp: 96.9 °F (36.1 °C)     SpO2: 95%     Weight: (!) 357 lb 8 oz (162.2 kg)     Height: 6' (1.829 m)         General: Alert and oriented to person, place, and time, well developed and well nourished, morbidly obese, in no acute distress  SKIN: Warm and dry, intact without any rash, masses or lesions  HEAD: normocephalic, atraumatic  Eyes: sclera/conjunctiva clear, PERRLA, EOMI's intact  ENT: tympanic membranes, external ear and ear canal normal bilaterally, normal hearing, Nose without deformity, nasal mucosa and turbinates normal without polyps   Throat: clear, tongue midline, no  drainage, no masses or lesions noted, good dentition  Neck: supple and non-tender without mass, trachea midline, no cervical lymphadenopathy, no bruit, no thyromegaly or nodules  Cardiovascular: regular rate and regular rhythm, normal S1 and S2,  no murmurs, rubs, clicks, or gallop. Distal pulses intact, no carotid bruits. No edema  Pulmonary/Chest: clear to auscultation bilaterally, no wheezes, rales or rhonchi, normal air movement, no respiratory distress  Abdomen: soft, non-tender, non-distended, normal bowel sounds, no masses or hepatosplenomegaly  Musculoskeletal: Limitation in lumbar ROM, no joint swelling, deformity or tenderness, Left shoulder much lower than the right and the left leg is 1.5 \" shorter than the right. Tender along the right lower leg on palpation, no swelling, redness or warmth.     Neurologic: reflexes normal and symmetric, no cranial nerve deficit, antalgic gait, coordination and speech normal  Extremities: no clubbing, cyanosis, or edema. Psychiatric: Good eye contact, normal mood and affect, answers questions appropriately    I have reviewed my findings and recommendations with Sachin Mcbride.     Lakeshia Sidhu, APRN - CNP, NP-C, FNP-BC

## 2021-07-13 NOTE — ASSESSMENT & PLAN NOTE
Well-controlled, continue current medications, continue current treatment plan, medication adherence emphasized and lifestyle modifications recommended   Discussed taking same time every day on empty stomach, no vitamins, minerals, calcium or iron for 4 hours of  thyroid medication. Wait 30 - 60 minutes to have breakfast or other medications.

## 2021-07-13 NOTE — ASSESSMENT & PLAN NOTE
New Unclear control, continue current medications, continue current treatment plan, lifestyle modifications recommended and recommend seeing Chiropractor copy of MRI given to patient to give to him prior to any manipulation. Recommend getting into a heated pool to exercise.

## 2021-07-29 DIAGNOSIS — M54.17 LUMBOSACRAL RADICULOPATHY AT L5: Primary | ICD-10-CM

## 2021-07-29 DIAGNOSIS — M54.16 LUMBAR RADICULITIS: ICD-10-CM

## 2021-07-29 RX ORDER — TRAMADOL HYDROCHLORIDE 50 MG/1
50 TABLET ORAL EVERY 4 HOURS PRN
Qty: 42 TABLET | Refills: 0 | Status: SHIPPED
Start: 2021-07-29 | End: 2021-08-06 | Stop reason: SDUPTHER

## 2021-08-04 DIAGNOSIS — M54.31 BILATERAL SCIATICA: Primary | ICD-10-CM

## 2021-08-04 DIAGNOSIS — M54.32 BILATERAL SCIATICA: Primary | ICD-10-CM

## 2021-08-04 RX ORDER — METHYLPREDNISOLONE 4 MG/1
TABLET ORAL
Qty: 1 KIT | Refills: 0 | Status: SHIPPED | OUTPATIENT
Start: 2021-08-04 | End: 2021-08-10

## 2021-08-06 DIAGNOSIS — M54.17 LUMBOSACRAL RADICULOPATHY AT L5: ICD-10-CM

## 2021-08-06 DIAGNOSIS — M54.16 LUMBAR RADICULITIS: ICD-10-CM

## 2021-08-06 RX ORDER — TRAMADOL HYDROCHLORIDE 50 MG/1
50 TABLET ORAL EVERY 8 HOURS PRN
Qty: 21 TABLET | Refills: 0 | Status: SHIPPED | OUTPATIENT
Start: 2021-08-06 | End: 2021-08-13

## 2021-08-11 ENCOUNTER — OFFICE VISIT (OUTPATIENT)
Dept: PAIN MANAGEMENT | Age: 66
End: 2021-08-11
Payer: MEDICARE

## 2021-08-11 ENCOUNTER — TELEPHONE (OUTPATIENT)
Dept: FAMILY MEDICINE CLINIC | Age: 66
End: 2021-08-11

## 2021-08-11 VITALS
DIASTOLIC BLOOD PRESSURE: 90 MMHG | TEMPERATURE: 95.7 F | WEIGHT: 293 LBS | RESPIRATION RATE: 16 BRPM | HEIGHT: 72 IN | OXYGEN SATURATION: 99 % | BODY MASS INDEX: 39.68 KG/M2 | SYSTOLIC BLOOD PRESSURE: 140 MMHG | HEART RATE: 91 BPM

## 2021-08-11 DIAGNOSIS — M47.816 LUMBAR FACET ARTHROPATHY: ICD-10-CM

## 2021-08-11 DIAGNOSIS — G89.4 CHRONIC PAIN SYNDROME: ICD-10-CM

## 2021-08-11 DIAGNOSIS — G89.29 CHRONIC PAIN OF RIGHT ANKLE: Primary | ICD-10-CM

## 2021-08-11 DIAGNOSIS — M25.571 CHRONIC PAIN OF RIGHT ANKLE: Primary | ICD-10-CM

## 2021-08-11 DIAGNOSIS — M54.16 LUMBAR RADICULOPATHY: ICD-10-CM

## 2021-08-11 DIAGNOSIS — M47.816 LUMBAR SPONDYLOSIS: ICD-10-CM

## 2021-08-11 DIAGNOSIS — M51.9 LUMBAR DISC DISORDER: ICD-10-CM

## 2021-08-11 DIAGNOSIS — E66.01 MORBID OBESITY WITH BMI OF 45.0-49.9, ADULT (HCC): ICD-10-CM

## 2021-08-11 PROCEDURE — 99204 OFFICE O/P NEW MOD 45 MIN: CPT | Performed by: PAIN MEDICINE

## 2021-08-11 NOTE — TELEPHONE ENCOUNTER
I spoke with Dr Sukumar Flannery earlier and he felt the patient was upset because he didn't give her pain medication today as she needs to have urine drug screen done and then she is to follow up but he felt she wouldn't make follow up. She was upset felt he was rude, explained we have to follow the rules and regulations of the government prior to rX her medications. We don't have a pain contract at this time and explained she would have to sign but I can't RX long term for her. Will be in touch after imaging and urine drug screen.

## 2021-08-11 NOTE — PROGRESS NOTES
Do you currently have any of the following:    Fever: No  Headache:  No  Cough: No  Shortness of breath: No  Exposed to anyone with these symptoms: No                                                                                                                Lance Oakes presents to the [address] [address] on 8/11/2021. Santhosh Velazco is complaining of pain in lower back and down right leg and right ankle is the most painful. . The pain is constant. The pain is described as aching, throbbing, shooting, stabbing, sharp and burning. Pain is rated on her best day at a 9, on her worst day at a 10, and on average at a 9 on the VAS scale. She took her last dose of Tramadol and Motrin . Santhosh Pill does not have issues with constipation. Any procedures since your last visit: No,      She is  on NSAIDS and  is  on anticoagulation medications to include none and is managed by NA. Pacemaker or defibrillator: No Physician managing device is NA. Medication Contract and Consent for Opioid Use Documents Filed      No documents found                   BP (!) 140/90   Pulse 91   Temp 95.7 °F (35.4 °C) (Infrared)   Resp 16   Ht 6' (1.829 m)   Wt (!) 357 lb (161.9 kg)   SpO2 99%   BMI 48.42 kg/m²      No LMP recorded.  Patient is postmenopausal.

## 2021-08-11 NOTE — TELEPHONE ENCOUNTER
Patient called stated that she just left Dr. Trino Mon office and he was really rude, treated her like she was a [place]. She also stated that he he does not treat patients with tramadol. Will not be rescheduled for 3 weeks. He just ordered a urine test and that was it. Patient wanted to know what she should do next,  Stated she would be running out of her tramadol soon, and that it takes everything out of her to get to the bathroom. Pt stated she will not go back to dr. Trino Mon. She would like a call back in regards to what the next steps would be and possible med refill.

## 2021-08-11 NOTE — PROGRESS NOTES
[facility]        [address] [facility], [address] [facility]      [phone]          Consult Note      Patient:  Keyla Rodriguez,  1955    Date of Service:  21    Requesting Physician:  AMBREEN Birch - *    Reason for Consult:      Patient presents with complaints of low back that started 2 years ago and has been progressively getting worse. HISTORY OF PRESENT ILLNESS:      Pain does radiate to right lower extremity. She  has numbness, tingling of the right ankle and does not have bladder or bowel dysfunction. She has not been on anticoagulation medications to include none. The patient  has not been on herbal supplements. The patient is not diabetic. Imaging:   Lumbar spine MRI :  L4-5 disc extrusion    Right knee xray:  1. Osteopenia and joint effusion. 2. Mild degenerative changes are seen involving all 3 joint   compartments. 3. Joint effusion. 4. Distal quadriceps tendinitis. Left knee xray  1. Osteoporosis and degenerative changes involving all 3 joint   compartments. 2. Small joint effusion. Previous treatments: Physical Therapy, Epidural Steroid Injection and medications. .      Opioid Agreement:  Date enacted:N/A  Renewal date:N/A    Past Medical History:   Diagnosis Date    Anxiety and depression     Bacteremia 2019    Chronic back pain     Diverticulitis     GERD (gastroesophageal reflux disease)     Gram-negative bacteremia 2019    Hypothyroidism     Neuropathy     Obesity     Rheumatoid arthritis involving multiple sites with positive rheumatoid factor ([facility] [address] 2017    Thyroid disease      Past Surgical History:   Procedure Laterality Date    ANESTHESIA NERVE BLOCK Left 2020    LEFT L5 TRANSFORAMINAL EPIDURAL STEROID INJECTION performed [facility] Ebony Escobedo DO [facility] [address] [facility] [address]  2017    robotic assisted cholecystectomy with IOC    COLONOSCOPY  09/11/2017    NERVE BLOCK Left 05/05/2020    NERVE BLOCK Left 05/05/2020    NERVE BLOCK Left 06/04/2020    transforaminal    NERVE BLOCK Left 07/14/2020    L4-5 transforaminal    OTHER SURGICAL HISTORY  04/11/2017    Laparoscopic Hand Assisted Open Sigmoid Colectomy with Colostomy     OTHER SURGICAL HISTORY  09/12/2017    laparscopic robotic assited colostomy reversed    PAIN MANAGEMENT PROCEDURE Left 6/4/2020    LEFT L4-5 TRANSFORAMINAL EPIDURAL STEROID INJECTION performed by Nico Lima DO at [address] [place] [address] [address] Left 7/14/2020    LEFT L4-5 TRANSFORAMINAL EPIDURAL STEROID INJECTION performed by Nico Lima DO at [facility]       Prior to Admission medications    Medication Sig Start Date End Date Taking? Authorizing Provider   traMADol (ULTRAM) 50 MG tablet Take 1 tablet by mouth every 8 hours as needed for Pain for up to 7 days. Intended supply: 7 days.  Take lowest dose possible to manage pain 8/6/21 8/13/21 Yes AMBREEN Cervantes CNP   sertraline (ZOLOFT) 100 MG tablet Take 1 tablet by mouth nightly 7/13/21  Yes AMBREEN Cervantes CNP   omeprazole (PRILOSEC) 40 MG delayed release capsule Take 1 capsule by mouth daily as needed (heartburn) 7/13/21  Yes AMBREEN Cervantes CNP   levothyroxine (SYNTHROID) 137 MCG tablet Take 1 tablet by mouth Daily 7/13/21  Yes AMBREEN Cervantes CNP   Handicap Placard MISC by Does not apply route Start 7/13/2021 and expires 7/12/2026 7/13/21  Yes AMBREEN Iyer CNP   metroNIDAZOLE (METROCREAM) 0.75 % cream Apply 1 Dose topically 2 times daily 4/22/21  Yes Historical Provider, MD   folic acid (FOLVITE) 1 MG tablet take 1 tablet by mouth once daily 3/15/21  Yes AMBREEN Cervantes CNP   prednisoLONE acetate (PRED FORTE) 1 % ophthalmic suspension instill 1 drop INTO AFFECTED EYE(S) twice a day 9/23/20  Yes Historical Provider, MD Upadacitinib ER 15 MG TB24 Take 15 mg by mouth daily   Yes Historical Provider, MD   Cholecalciferol 50 MCG (2000) TABS Take 1 tablet by mouth daily 20  Yes Charlynn Bloch, APRN - CNP   methotrexate (RHEUMATREX) 2.5 MG chemo tablet  18  Yes Historical Provider, MD   Multiple Vitamins-Minerals (MULTI-VITAMIN GUMMIES PO) Take 2 tablets by mouth daily   Yes Historical Provider, MD   doxycycline monohydrate (MONODOX) 100 MG capsule take 1 capsule by mouth twice a day  Patient not taking: Reported on 2021   Historical Provider, MD     Allergies   Allergen Reactions    Enbrel [Etanercept]      Swelling    Humira [Adalimumab]      Swelling     Penicillins Swelling and Rash     n/v     Social History     Socioeconomic History    Marital status: Single     Spouse name: Not on file    Number of children: Not on file    Years of education: Not on file    Highest education level: Not on file   Occupational History    Not on file   Tobacco Use    Smoking status: Former Smoker     Packs/day: 0.00     Years: 0.00     Pack years: 0.00     Quit date: 1986     Years since quittin.6    Smokeless tobacco: Never Used    Tobacco comment: quit 20 yrs ago   Vaping Use    Vaping Use: Never used   Substance and Sexual Activity    Alcohol use: No    Drug use: No    Sexual activity: Not on file   Other Topics Concern    Not on file   Social History Narrative    Not on file     Social Determinants of Health     Financial Resource Strain: Low Risk     Difficulty of Paying Living Expenses: Not hard at all   Food Insecurity: No Food Insecurity    Worried About [address] in the Last Year: Never true    [address] in the Last Year: Never true   Transportation Needs: No Transportation Needs    Lack of Transportation (Medical): No    Lack of Transportation (Non-Medical):  No   Physical Activity:     Days of Exercise per Week:     Minutes of Exercise per Session:    Stress:  Feeling of Stress :    Social Connections:     Frequency of Communication with Friends and Family:     Frequency of Social Gatherings with Friends and Family:     Attends [employer] Services:     Active Member of Clubs or Organizations:     Attends Club or Organization Meetings:     Marital Status:    Intimate Partner Violence:     Fear of Current or Ex-Partner:     Emotionally Abused:     Physically Abused:     Sexually Abused:      Family History   Problem Relation Age of Onset    Diabetes Mother     Breast Cancer Mother     Other Father     Heart Disease Father      REVIEW OF SYSTEMS:     Patient specifically denies fever/chills, chest pain, shortness of breath, new bowel or bladder complaints. All other review of systems was negative. PHYSICAL EXAMINATION:      BP (!) 140/90   Pulse 91   Temp 95.7 °F (35.4 °C) (Infrared)   Resp 16   Ht 6' (1.829 m)   Wt (!) 357 lb (161.9 kg)   SpO2 99%   BMI 48.42 kg/m²     General:      General appearance:   pleasant and well-hydrated. , in moderate discomfort and A & O x3  Build: Morbidly obese    HEENT:    Head:normocephalic and atraumatic  Sclera: icterus absent,     Lungs:    Breathing:Breathing Pattern: regular, no distress    Abdomen:    Shape:obese, non-distended and normal  Tenderness:none    Lumbar spine:    Spine inspection:normal   CVA tenderness:No   Palpation:tenderness paravertebral muscles, facet loading, left, right, positive and tenderness. Range of motion:abnormal moderately Lateral bending, flexion, extension rotation bilateral and is  painful.     Musculoskeletal:    Trigger points in Paraveteral:absent bilaterally  SI joint tenderness:negative right, negative left              KHALIF test:not done right, not done             left  Piriformis tenderness:negative right, negative left  Trochanteric bursa tenderness:negative right, negative left  SLR:negative right, negative left, sitting     Extremities:    Tremors:None bilaterally upper and lower  Range of motion:pain with internal rotation of hips negative. Intact:Yes  Edema:Normal  Limited ROM of right ankle    Neurological:    Sensory:normal to light touch bilateral lower extremities. Motor:                           Right Quadriceps5/5          Left Quadriceps5/5           Right Gastrocnemius5/5    Left Gastrocnemius5/5  Right Ant Tibialis5/5  Left Ant Tibialis5/5  Reflexes:    Right Quadriceps reflexunable to ilicit   Left Quadriceps reflexunable to ilicit  Right Achilles reflex2+  Left Achilles reflex2+  Gait:antalgic with a cane    Dermatology:    Skin:no unusual rashes and no skin lesions    Impression:  Chronic low back with radiation to the right lower extremity. Lumbar spine MRI multilevel degenerative disease worst at L4-5 and L5-S1 with L4-5 disc extrusion. Patient had seen Alyson Feliz and had LESI and TFESI which per patient didn't help. Plan:  Axial low back pain with lumbar radiculopathy and Right ankle OA. Reviewed imaging studies and discussed with the patient. Will schedule patient for right ankle xray. Will refer patient to physical therapy. Patient is asking for a pain pill, advised patient that I will need to get a urine screen and she will need to sign opioid agreement before considering chronic opioid therapy. Urine screen today. OARRS report reviewed 08/2021  Patient encouraged to stay active and to lose weight. Treatment plan discussed with the patient. Patient left without scheduling an appointment. We discussed with the patient that combining opioids, benzodiazepines, alcohol, illicit drugs or sleep aids increases the risk of respiratory depression including death. We discussed that these medications may cause drowsiness, sedation or dizziness and have counseled the patient not to drive or operate machinery. We have discussed that these medications will be prescribed only by one provider.  We have discussed with the patient about age related risk factors and have thoroughly discussed the importance of taking these medications as prescribed. The patient verbalizes understanding. ccrefjose ming kyle Medina M.D.

## 2021-08-12 DIAGNOSIS — G89.29 CHRONIC PAIN OF RIGHT ANKLE: Primary | ICD-10-CM

## 2021-08-12 DIAGNOSIS — M25.571 CHRONIC PAIN OF RIGHT ANKLE: Primary | ICD-10-CM

## 2021-08-13 DIAGNOSIS — M54.16 LUMBAR RADICULOPATHY: Primary | ICD-10-CM

## 2021-08-13 RX ORDER — METHYLPREDNISOLONE 4 MG/1
TABLET ORAL
Qty: 1 KIT | Refills: 0 | Status: SHIPPED | OUTPATIENT
Start: 2021-08-13 | End: 2021-08-19

## 2021-08-16 ENCOUNTER — OFFICE VISIT (OUTPATIENT)
Dept: FAMILY MEDICINE CLINIC | Age: 66
End: 2021-08-16
Payer: MEDICARE

## 2021-08-16 VITALS
TEMPERATURE: 96.2 F | DIASTOLIC BLOOD PRESSURE: 84 MMHG | RESPIRATION RATE: 18 BRPM | OXYGEN SATURATION: 99 % | BODY MASS INDEX: 48.42 KG/M2 | HEART RATE: 71 BPM | SYSTOLIC BLOOD PRESSURE: 138 MMHG | HEIGHT: 72 IN

## 2021-08-16 DIAGNOSIS — M25.571 CHRONIC PAIN OF RIGHT ANKLE: Primary | ICD-10-CM

## 2021-08-16 DIAGNOSIS — M77.31 HEEL SPUR, RIGHT: ICD-10-CM

## 2021-08-16 DIAGNOSIS — M72.2 PLANTAR FASCIITIS OF RIGHT FOOT: ICD-10-CM

## 2021-08-16 DIAGNOSIS — G89.29 CHRONIC PAIN OF RIGHT ANKLE: Primary | ICD-10-CM

## 2021-08-16 PROCEDURE — 99213 OFFICE O/P EST LOW 20 MIN: CPT | Performed by: NURSE PRACTITIONER

## 2021-08-16 ASSESSMENT — ENCOUNTER SYMPTOMS
SHORTNESS OF BREATH: 0
BACK PAIN: 1
COUGH: 0
WHEEZING: 0
COLOR CHANGE: 0

## 2021-08-16 NOTE — PROGRESS NOTES
OFFICE PROGRESS NOTE  [address] [place] [address] Rd  [address] [id number]  Dept: [phone]   Chief Complaint   Patient presents with    Back Pain       ASSESSMENT/PLAN   1. Chronic pain of right ankle  Assessment & Plan:   keep appt with Podiatry  2. Plantar fasciitis of right foot  Assessment & Plan:  New Uncontrolled, continue current medications, lifestyle modifications recommended and keep appt with Podiatry, exercises 3 times a day  3. Heel spur, right  Assessment & Plan:  New Uncontrolled, continue current medications, medication adherence emphasized, lifestyle modifications recommended and keep appt with Podiatry       Reviewed labs: drug screen 8/11/21 ordered by Dr Chrissie Bullock found to be as expected  Reviewed notes from Dr Chrissie Bullock 8/11/21  Reviewed radiology 8/11/.21 right ankle    Discussed weight loss and Discussed taking medications as directed and adverse effects    Return in about 4 months (around 12/16/2021) for GERD. HPI:   Here today for follow up on back pain left sciatic and right foot. She did see DR Chrissie Bullock and had to wait for the drug screen to come back for any medication. She had right foot x ray done which indicates Achilles and plantar heel spurs. I did make referral to [address] [place] [address] and she has an appointment on 8/25/21. She has also seen DR Sylwia Martinez and he wanted her to have x rays done. She isn't sure she is going back. She called last week and was having so much pain I put her on MDP. Today she says the pain isn't as bad as it was. She did take the last of the Tramadol. She took one last night and one this morning. She is still having pain in the right foot but she can at least walk to the bathroom and has been using her walker.  .     Current Outpatient Medications:     methylPREDNISolone (MEDROL DOSEPACK) 4 MG tablet, Take by mouth as directed., Disp: 1 kit, Rfl: 0    doxycycline monohydrate (MONODOX) 100 MG capsule, take 1 capsule by mouth twice a day, Disp: , Rfl:     sertraline (ZOLOFT) 100 MG tablet, Take 1 tablet by mouth nightly, Disp: 90 tablet, Rfl: 1    omeprazole (PRILOSEC) 40 MG delayed release capsule, Take 1 capsule by mouth daily as needed (heartburn), Disp: 90 capsule, Rfl: 1    levothyroxine (SYNTHROID) 137 MCG tablet, Take 1 tablet by mouth Daily, Disp: 90 tablet, Rfl: 1    Handicap Placard MISC, by Does not apply route Start 7/13/2021 and expires 7/12/2026, Disp: 1 each, Rfl: 0    metroNIDAZOLE (METROCREAM) 0.75 % cream, Apply 1 Dose topically 2 times daily, Disp: , Rfl:     folic acid (FOLVITE) 1 MG tablet, take 1 tablet by mouth once daily, Disp: 30 tablet, Rfl: 6    prednisoLONE acetate (PRED FORTE) 1 % ophthalmic suspension, instill 1 drop INTO AFFECTED EYE(S) twice a day, Disp: , Rfl:     Upadacitinib ER 15 MG TB24, Take 15 mg by mouth daily, Disp: , Rfl:     Cholecalciferol 50 MCG (2000 UT) TABS, Take 1 tablet by mouth daily, Disp: 30 tablet, Rfl: 6    methotrexate (RHEUMATREX) 2.5 MG chemo tablet, , Disp: , Rfl:     Multiple Vitamins-Minerals (MULTI-VITAMIN GUMMIES PO), Take 2 tablets by mouth daily, Disp: , Rfl:       Surgical History:  has a past surgical history that includes Appendectomy; Tonsillectomy; Cholecystectomy, laparoscopic (02/23/2017); other surgical history (04/11/2017); Colonoscopy (09/11/2017); other surgical history (09/12/2017); Nerve Block (Left, 05/05/2020); Nerve Block (Left, 05/05/2020); Anesthesia Nerve Block (Left, 5/5/2020); Nerve Block (Left, 06/04/2020); Pain management procedure (Left, 6/4/2020); Nerve Block (Left, 07/14/2020); and Pain management procedure (Left, 7/14/2020). Social History:  reports that she quit smoking about 34 years ago. She smoked 0.00 packs per day for 0.00 years. She has never used smokeless tobacco. She reports that she does not drink alcohol and does not use drugs.   Family History: family history includes Breast Cancer in her mother; Diabetes in her mother; Heart Disease in her father; Other in her father. I have reviewed Rosalie's allergies, medications, problem list, medical, social and family history and have updated as needed in the electronic medical record    Review of Systems   Constitutional: Positive for activity change. Negative for appetite change, chills, diaphoresis, fatigue, fever and unexpected weight change. Respiratory: Negative for cough, shortness of breath and wheezing. Cardiovascular: Negative for chest pain, palpitations and leg swelling. Musculoskeletal: Positive for arthralgias, back pain and gait problem. Negative for joint swelling, myalgias, neck pain and neck stiffness. Skin: Negative for color change, pallor, rash and wound. Neurological: Negative for dizziness, tremors, seizures, syncope, facial asymmetry, speech difficulty, weakness, light-headedness, numbness and headaches. OBJECTIVE:     VS:  Wt Readings from Last 3 Encounters:   08/11/21 (!) 357 lb (161.9 kg)   07/13/21 (!) 357 lb 8 oz (162.2 kg)   04/28/21 (!) 350 lb 6.4 oz (158.9 kg)                       Vitals:    08/16/21 1436   BP: 138/84   Pulse: 71   Resp: 18   Temp: 96.2 °F (35.7 °C)   SpO2: 99%   Height: 6' (1.829 m)       General: Alert and oriented to person, place, and time, well developed and well nourished, morbidly obese,  in no acute distress  SKIN: Warm and dry, intact without any rash, masses or lesions  HEAD: normocephalic, atraumatic  Neck: supple and non-tender without mass, trachea midline, no cervical lymphadenopathy, no bruit, no thyromegaly or nodules  Cardiovascular: regular rate and regular rhythm, normal S1 and S2,  no murmurs, rubs, clicks, or gallop. Distal pulses intact, no carotid bruits.  No edema  Pulmonary/Chest: clear to auscultation bilaterally, no wheezes, rales or rhonchi, normal air movement, no respiratory distress  Abdomen: soft, non-tender, non-distended, normal bowel sounds, no masses or hepatosplenomegaly  Musculoskeletal: Normal ROM right ankle, tender over the bottom of the heel, no joint swelling, deformity    Neurologic: antalgic gait sitting in WC today does have cane with her, coordination and speech normal  Extremities: no clubbing, cyanosis, or edema. Psychiatric: Good eye contact, normal mood and affect, answers questions appropriately    I have reviewed my findings and recommendations with Viola Sargent.     Jakob Villanueva, AMBREEN - CNP, NP-C, FNP-BC

## 2021-08-16 NOTE — PATIENT INSTRUCTIONS
bottle. 3. Roll your foot back and forth over the object to massage the bottom of your foot. 4. If you want to do ice massage, fill a water bottle about three-fourths of the way full and freeze before using. 5. Continue for 2 to 5 minutes. Bilateral calf stretch (knees straight)   1. Place a book on the floor a few inches from a wall or countertop, and put the balls of your feet on it. Your heels should be on the floor. The book needs to be thick enough so that you can feel a gentle stretch in each calf. If you are not steady on your feet, hold on to a chair, counter, or wall while you do this stretch. 2. Keep your knees straight, and lean forward until you feel a stretch in each calf. 3. To get more stretch, add another book or use a thicker book, such as a phone book, a dictionary, or an encyclopedia. 4. Hold the stretch for at least 15 to 30 seconds. 5. Repeat 2 to 4 times. Bilateral calf stretch (knees bent)   1. Place a book on the floor a few inches from a wall or countertop, and put the balls of your feet on it. Your heels should be on the floor. The book needs to be thick enough so that you can feel a gentle stretch in each calf. If you are not steady on your feet, hold on to a chair, counter, or wall while you do this stretch. 2. Bend your knees, and lean forward until you feel a stretch in each calf. 3. To get more stretch, add another book or use a thicker book, such as a phone book, a dictionary, or an encyclopedia. 4. Hold the stretch for at least 15 to 30 seconds. 5. Repeat 2 to 4 times. [place] pick-ups   1. Put some marbles on the floor next to a cup.  2. Sit down, and use the toes of your affected foot to lift up one marble from the floor at a time. Then try to put the marble in the cup.  3. Repeat 8 to 12 times. Towel scrunches   1. Sit down, and place your affected foot on a towel on the floor. You may also do this with both feet on the towel.   2. Scrunch the towel toward you with your toes. Then use your toes to push the towel back into place. 3. Repeat 8 to 12 times. Heel raises on a step   1. Stand on the bottom step of a staircase, facing up toward the stairs. Put the balls of your feet on the step. If you are not steady on your feet, hold on to the banister or wall. 2. Keeping both knees straight, slowly lift your heels above the step so that you are standing on your toes. Then slowly lower your heels below the step and toward the floor. 3. Return to the starting position, with your feet even with the step. 4. Repeat 8 to 12 times. Follow-up care is a key part of your treatment and safety. Be sure to make and go to all appointments, and call your doctor if you are having problems. It's also a good idea to know your test results and keep a list of the medicines you take. Where can you learn more? Go to https://Telogis.SumUp. org and sign in to your [employer] account. Enter H119 in the Timeet box to learn more about \"Arch Pain: Exercises. \"     If you do not have an account, please click on the \"Sign Up Now\" link. Current as of: November 16, 2020               Content Version: 12.9  © 0967-8979 Healthwise, Incorporated. Care instructions adapted under license by [facility] ([facility]). If you have questions about a medical condition or this instruction, always ask your healthcare professional. [place] [address] any warranty or liability for your use of this information. Patient Education        Chronic Pain: Care Instructions  Your Care Instructions     Chronic pain is pain that lasts a long time (months or even years) and may or may not have a clear cause. It is different from acute pain, which usually does have a clear causelike an injury or illnessand gets better over time. Chronic pain:  · Lasts over time but may vary from day to day. · Does not go away despite efforts to end it.   · May disrupt your sleep and lead to fatigue. · May cause depression or anxiety. · May make your muscles tense, causing more pain. · Can disrupt your work, hobbies, home life, and relationships with friends and family. Chronic pain is a very real condition. It is not just in your head. Treatment can help and usually includes several methods used together, such as medicines, physical therapy, exercise, and other treatments. Learning how to relax and changing negative thought patterns can also help you cope. Chronic pain is complex. Taking an active role in your treatment will help you better manage your pain. Tell your doctor if you have trouble dealing with your pain. You may have to try several things before you find what works best for you. Follow-up care is a key part of your treatment and safety. Be sure to make and go to all appointments, and call your doctor if you are having problems. It's also a good idea to know your test results and keep a list of the medicines you take. How can you care for yourself at home? · Pace yourself. Break up large jobs into smaller tasks. Save harder tasks for days when you have less pain, or go back and forth between hard tasks and easier ones. Take rest breaks. · Relax, and reduce stress. Relaxation techniques such as deep breathing or meditation can help. · Keep moving. Gentle, daily exercise can help reduce pain over the long run. Try low- or no-impact exercises such as walking, swimming, and stationary biking. Do stretches to stay flexible. · Try heat, cold packs, and massage. · Get enough sleep. Chronic pain can make you tired and drain your energy. Talk with your doctor if you have trouble sleeping because of pain. · Think positive. Your thoughts can affect your pain level. Do things that you enjoy to distract yourself when you have pain instead of focusing on the pain. See a movie, read a book, listen to music, or spend time with a friend.   · If you think you are depressed, talk to your doctor about treatment. · Keep a daily pain diary. Record how your moods, thoughts, sleep patterns, activities, and medicine affect your pain. You may find that your pain is worse during or after certain activities or when you are feeling a certain emotion. Having a record of your pain can help you and your doctor find the best ways to treat your pain. · Take pain medicines exactly as directed. ? If the doctor gave you a prescription medicine for pain, take it as prescribed. ? If you are not taking a prescription pain medicine, ask your doctor if you can take an over-the-counter medicine. Reducing constipation caused by pain medicine  · Talk to your doctor about a laxative. If a laxative doesn't work, your doctor may suggest a prescription medicine. · Include fruits, vegetables, beans, and whole grains in your diet each day. These foods are high in fiber. · If your doctor recommends it, get more exercise. Walking is a good choice. Bit by bit, increase the amount you walk every day. Try for at least 30 minutes on most days of the week. · Schedule time each day for a bowel movement. A daily routine may help. Take your time and do not strain when having a bowel movement. When should you call for help? Call your doctor now or seek immediate medical care if:    · Your pain gets worse or is out of control.     · You feel down or blue, or you do not enjoy things like you once did. You may be depressed, which is common in people with chronic pain. Depression can be treated.     · You have vomiting or cramps for more than 2 hours. Watch closely for changes in your health, and be sure to contact your doctor if:    · You cannot sleep because of pain.     · You are very worried or anxious about your pain.     · You have trouble taking your pain medicine.     · You have any concerns about your pain medicine.     · You have trouble with bowel movements, such as:  ? No bowel movement in 3 days. ?  Blood in the anal area, in your stool, or on the toilet paper. ? Diarrhea for more than 24 hours. Where can you learn more? Go to https://chpepiceweb.Lumenz. org and sign in to your [employer] account. Enter N004 in the [facility] box to learn more about \"Chronic Pain: Care Instructions. \"     If you do not have an account, please click on the \"Sign Up Now\" link. Current as of: August 4, 2020               Content Version: 12.9  © 2006-2021 [facility]. Care instructions adapted under license by [facility] ([facility]). If you have questions about a medical condition or this instruction, always ask your healthcare professional. [place] [address] any warranty or liability for your use of this information. Patient Education        Plantar Fasciitis: Care Instructions  Overview     Plantar fasciitis is pain and inflammation of the plantar fascia, the tissue at the bottom of your foot that connects the heel bone to the toes. The plantar fascia also supports the arch. If you strain the plantar fascia, it can develop small tears and cause heel pain when you stand or walk. Plantar fasciitis can be caused by running or other sports. It also may occur in people who are overweight or who have high arches or flat feet. You may get plantar fasciitis if you walk or stand for long periods, or have a tight Achilles tendon or calf muscles. You can improve your foot pain with rest and other care at home. It might take a few weeks to a few months for your foot to heal completely. Follow-up care is a key part of your treatment and safety. Be sure to make and go to all appointments, and call your doctor if you are having problems. It's also a good idea to know your test results and keep a list of the medicines you take. How can you care for yourself at home? · Rest your feet often. Reduce your activity to a level that lets you avoid pain. If possible, do not run or walk on hard surfaces.   · Take pain medicines exactly as directed. ? If the doctor gave you a prescription medicine for pain, take it as prescribed. ? If you are not taking a prescription pain medicine, take an over-the-counter anti-inflammatory medicine for pain and swelling, such as ibuprofen (Advil, Motrin) or naproxen (Aleve). Read and follow all instructions on the label. · Use ice massage to help with pain and swelling. You can use an ice cube or an ice cup several times a day. To make an ice cup, fill a paper cup with water and freeze it. Cut off the top of the cup until a half-inch of ice shows. Hold onto the remaining paper to use the cup. Rub the ice in small circles over the area for 5 to 7 minutes. · Contrast baths, which alternate hot and cold water, can also help reduce swelling. But because heat alone may make pain and swelling worse, end a contrast bath with a soak in cold water. · Wear a night splint if your doctor suggests it. A night splint holds your foot with the toes pointed up and the foot and ankle at a 90-degree angle. This position gives the bottom of your foot a constant, gentle stretch. · Do simple exercises such as calf stretches and towel stretches 2 to 3 times each day, especially when you first get up in the morning. These can help the plantar fascia become more flexible. They also make the muscles that support your arch stronger. Hold these stretches for 15 to 30 seconds per stretch. Repeat 2 to 4 times. ? Stand about 1 foot from a wall. Place the palms of both hands against the wall at chest level. Lean forward against the wall, keeping one leg with the knee straight and heel on the ground while bending the knee of the other leg.  ? Sit down on the floor or a mat with your feet stretched in front of you. Roll up a towel lengthwise, and loop it over the ball of your foot. Holding the towel at both ends, gently pull the towel toward you to stretch your foot. · Wear shoes with good arch support.  Athletic shoes or shoes with a well-cushioned sole are good choices. · Replace athletic shoes regularly. · Try heel cups or shoe inserts (orthotics) to help cushion your heel. You can buy these at many shoe stores. · Put on your shoes as soon as you get out of bed. Going barefoot or wearing slippers may make your pain worse. · Reach and stay at a good weight for your height. This puts less strain on your feet. When should you call for help? Call your doctor now or seek immediate medical care if:    · You have heel pain with fever, redness, or warmth in your heel.     · You cannot put weight on the sore foot. Watch closely for changes in your health, and be sure to contact your doctor if:    · You have numbness or tingling in your heel.     · Your heel pain lasts more than 2 weeks. Where can you learn more? Go to https://AcadiaSoft.Cons[employer]. org and sign in to your [employer] account. Enter J834 in the [employer] box to learn more about \"Plantar Fasciitis: Care Instructions. \"     If you do not have an account, please click on the \"Sign Up Now\" link. Current as of: November 16, 2020               Content Version: 12.9  © 2006-2021 [employer]. Care instructions adapted under license by [facility] ([facility]). If you have questions about a medical condition or this instruction, always ask your healthcare professional. [place] [address] any warranty or liability for your use of this information. Patient Education        Plantar Fasciitis: Exercises  Introduction  Here are some examples of exercises for you to try. The exercises may be suggested for a condition or for rehabilitation. Start each exercise slowly. Ease off the exercises if you start to have pain. You will be told when to start these exercises and which ones will work best for you. How to do the exercises  Towel stretch   1. Sit with your legs extended and knees straight.   2. Place a towel around your foot just under the toes. 3. Hold each end of the towel in each hand, with your hands above your knees. 4. Pull back with the towel so that your foot stretches toward you. 5. Hold the position for at least 15 to 30 seconds. 6. Repeat 2 to 4 times a session, up to 5 sessions a day. Calf stretch   This exercise stretches the muscles at the back of the lower leg (the calf) and the Achilles tendon. Do this exercise 3 or 4 times a day, 5 days a week. 1. Stand facing a wall with your hands on the wall at about eye level. Put the leg you want to stretch about a step behind your other leg. 2. Keeping your back heel on the floor, bend your front knee until you feel a stretch in the back leg. 3. Hold the stretch for 15 to 30 seconds. Repeat 2 to 4 times. Plantar fascia and calf stretch   Stretching the plantar fascia and calf muscles can increase flexibility and decrease heel pain. You can do this exercise several times each day and before and after activity. 1. Stand on a step as shown above. Be sure to hold on to the banister. 2. Slowly let your heels down over the edge of the step as you relax your calf muscles. You should feel a gentle stretch across the bottom of your foot and up the back of your leg to your knee. 3. Hold the stretch about 15 to 30 seconds, and then tighten your calf muscle a little to bring your heel back up to the level of the step. Repeat 2 to 4 times. Towel curls   Make this exercise more challenging by placing a weighted object, such as a soup can, on the other end of the towel. 1. While sitting, place your foot on a towel on the floor and scrunch the towel toward you with your toes. 2. Then, also using your toes, push the towel away from you. [place] pickups   1. Put marbles on the floor next to a cup.  2. Using your toes, try to lift the marbles up from the floor and put them in the cup. Follow-up care is a key part of your treatment and safety.  Be sure to make and go to all appointments, and call your doctor if you are having problems. It's also a good idea to know your test results and keep a list of the medicines you take. Where can you learn more? Go to https://Digitelpeyamila3D Hubs.Loudr. org and sign in to your [employer] account. Enter E129 in the [facility] box to learn more about \"Plantar Fasciitis: Exercises. \"     If you do not have an account, please click on the \"Sign Up Now\" link. Current as of: November 16, 2020               Content Version: 12.9  © 2426-9766 Healthwise, Incorporated. Care instructions adapted under license by [facility] ([facility]). If you have questions about a medical condition or this instruction, always ask your healthcare professional. [address] any warranty or liability for your use of this information.

## 2021-08-16 NOTE — ASSESSMENT & PLAN NOTE
New Uncontrolled, continue current medications, lifestyle modifications recommended and keep appt with Podiatry, exercises 3 times a day

## 2021-08-25 ENCOUNTER — OFFICE VISIT (OUTPATIENT)
Dept: PODIATRY | Age: 66
End: 2021-08-25
Payer: MEDICARE

## 2021-08-25 VITALS — WEIGHT: 293 LBS | BODY MASS INDEX: 39.68 KG/M2 | HEIGHT: 72 IN

## 2021-08-25 DIAGNOSIS — M25.571 PAIN IN RIGHT ANKLE AND JOINTS OF RIGHT FOOT: ICD-10-CM

## 2021-08-25 DIAGNOSIS — M76.821 POSTERIOR TIBIAL TENDONITIS OF RIGHT LEG: Primary | ICD-10-CM

## 2021-08-25 DIAGNOSIS — R26.2 DIFFICULTY WALKING: ICD-10-CM

## 2021-08-25 DIAGNOSIS — R60.0 LOCALIZED EDEMA: ICD-10-CM

## 2021-08-25 PROCEDURE — 99203 OFFICE O/P NEW LOW 30 MIN: CPT | Performed by: PODIATRIST

## 2021-08-25 PROCEDURE — 29580 STRAPPING UNNA BOOT: CPT | Performed by: PODIATRIST

## 2021-08-25 NOTE — PROGRESS NOTES
21     Chasidy Salter    : 1955 Sex: female   Age: 74 y.o. Patient was referred by: PUNEET Perez  Patient's PCP/Provider is:  AMBREEN Hummel CNP    Subjective:    Patient is seen today for evaluation regarding pain and swelling into her right ankle and hindfoot region. No chief complaint on file. HPI: Patient stated the issues have been going on for several weeks now and has progressively gotten worse. She did have a tubal evaluations and x-ray studies. These did reveal calcaneal spurring. Patient has been unable to bear full weight on her right lower extremity for several weeks. He denies any recent injuries or changes in activities. They have not tried any OTC treatments to this point in time. No other additional abnormalities noted. ROS:  Const: Positives and pertinent negatives as per HPI. Musculo: Denies symptoms other than stated above. Neuro: Denies symptoms other than stated above. Skin: Denies symptoms other than stated above.     Current Medications:    Current Outpatient Medications:     doxycycline monohydrate (MONODOX) 100 MG capsule, take 1 capsule by mouth twice a day, Disp: , Rfl:     sertraline (ZOLOFT) 100 MG tablet, Take 1 tablet by mouth nightly, Disp: 90 tablet, Rfl: 1    omeprazole (PRILOSEC) 40 MG delayed release capsule, Take 1 capsule by mouth daily as needed (heartburn), Disp: 90 capsule, Rfl: 1    levothyroxine (SYNTHROID) 137 MCG tablet, Take 1 tablet by mouth Daily, Disp: 90 tablet, Rfl: 1    Handicap Placard MISC, by Does not apply route Start 2021 and expires 2026, Disp: 1 each, Rfl: 0    metroNIDAZOLE (METROCREAM) 0.75 % cream, Apply 1 Dose topically 2 times daily, Disp: , Rfl:     folic acid (FOLVITE) 1 MG tablet, take 1 tablet by mouth once daily, Disp: 30 tablet, Rfl: 6    prednisoLONE acetate (PRED FORTE) 1 % ophthalmic suspension, instill 1 drop INTO AFFECTED EYE(S) twice a day, Disp: , Rfl:    STEROID INJECTION performed by Melinda Figueroa DO at [address] [facility] [address] Left 2020    LEFT L4-5 TRANSFORAMINAL EPIDURAL STEROID INJECTION performed by Melinda Figueroa DO at [address] History     Tobacco Use    Smoking status: Former Smoker     Packs/day: 0.00     Years: 0.00     Pack years: 0.00     Quit date: 1986     Years since quittin.7    Smokeless tobacco: Never Used    Tobacco comment: quit 20 yrs ago   Vaping Use    Vaping Use: Never used   Substance Use Topics    Alcohol use: No    Drug use: No           Diagnostic studies:    XR ANKLE RIGHT (MIN 3 VIEWS)    Result Date: 2021  EXAMINATION: THREE XRAY VIEWS OF THE RIGHT ANKLE 2021 11:05 am COMPARISON: None. HISTORY: ORDERING SYSTEM PROVIDED HISTORY: Chronic pain of right ankle FINDINGS: No widening of the ankle mortise is evident. There are plantar and Achilles heel spurs. No visible fracture or dislocation. Achilles and plantar heel spurs. Procedures: An unna boot  compressive wrap was applied to the right lower extremity. It's purpose is to  decrease  the amount of edema present, and to allow proper healing of the soft tissues. The patient was instructed to keep the unna boot clean, dry and intact until the next follow up visit. The patient was instructed to look for signs of redness, irritation, blistering and pain. If these or any other symptoms were to develop, they were advised to contact the office immediately for reevaluation. Exam:  VASCULAR: Pedal pulses palpable right lower extremity. Capillary fill time less than 5 seconds digits 1 through 5 right foot. NEUROLOGICAL: Epicritic sensations intact right lower extremity. No paresthesias noted upon percussion tarsal tunnel region right foot. DERMATOLOGICAL: Edematous issues noted right lower extremity without ecchymotic skin changes present.   Varicosities and stasis skin changes noted right lower extremity. No skin abrasions or any signs of infection noted right lower extremity. MUSCULOSKELETAL: Tenderness noted to palpation overlying the medial malleoli region right ankle. Tenderness also noted with active range of motion right ankle and subtalar joint along the posterior tibial tendon course. No tenderness noted to the posterior Achilles insertional area or plantar fascial area right. Patient presents in a wheelchair today with limited weightbearing. Plan Per Assessment  Diagnoses and all orders for this visit:    Posterior tibial tendonitis of right leg    Localized edema    Pain in right ankle and joints of right foot    Difficulty walking        1. New patient evaluation and management  2. We did review x-ray studies with patient and her daughter in detail today. No acute osseous abnormalities noted. 3. Compression dressing was applied to the symptomatic right lower extremity as described above. We did dispense an offloading shoe to be utilized in the interim to allow for appropriate symptom improvement. The patient was dispensed a postop shoe. It is medically necessary and within the standard of care for the patient's diagnosis. Its purpose is to control the motion of the foot and to allow a decrease in inflammation. The patient was instructed on its proper application and use. The patient was also instructed to watch for areas of rubbing, irritation, blister formation, or any other signs of abnormal pressure. If this occurs, the patient is to contact the office immediately. The ABN was reviewed and signed by the patient prior to leaving this appointment. 4. Patient was advised to limit her daily activities and elevate the right lower extremity throughout the day to improve current symptoms. Patient was to use her cane for ambulatory assistance at home. 5. Patient will be followed up in 1 week's time for continued evaluation and management.   They were advised to call the office with any questions or concerns in the interim. Seen By:    Elvia Braga DPM    Electronically signed by Elvia Braga DPM on 8/25/2021 at 3:37 PM      This note was created using voice recognition software. The note was reviewed however may contain grammatical errors.

## 2021-08-25 NOTE — PROGRESS NOTES
Patient is in today for evaluation of right ankle pain. Patient says she is having gait issues because the pain has been so bad.  pcp is AMBREEN Cortes - CNP  Last ov 8/16/21

## 2021-09-01 ENCOUNTER — OFFICE VISIT (OUTPATIENT)
Dept: PODIATRY | Age: 66
End: 2021-09-01
Payer: MEDICARE

## 2021-09-01 ENCOUNTER — OFFICE VISIT (OUTPATIENT)
Dept: FAMILY MEDICINE CLINIC | Age: 66
End: 2021-09-01
Payer: MEDICARE

## 2021-09-01 VITALS
DIASTOLIC BLOOD PRESSURE: 80 MMHG | TEMPERATURE: 96.8 F | OXYGEN SATURATION: 98 % | BODY MASS INDEX: 39.68 KG/M2 | WEIGHT: 293 LBS | RESPIRATION RATE: 17 BRPM | HEART RATE: 84 BPM | SYSTOLIC BLOOD PRESSURE: 138 MMHG | HEIGHT: 72 IN

## 2021-09-01 VITALS — BODY MASS INDEX: 39.68 KG/M2 | WEIGHT: 293 LBS | HEIGHT: 72 IN

## 2021-09-01 DIAGNOSIS — R26.2 DIFFICULTY WALKING: ICD-10-CM

## 2021-09-01 DIAGNOSIS — N30.01 ACUTE CYSTITIS WITH HEMATURIA: Primary | ICD-10-CM

## 2021-09-01 DIAGNOSIS — M25.571 PAIN IN RIGHT ANKLE AND JOINTS OF RIGHT FOOT: ICD-10-CM

## 2021-09-01 DIAGNOSIS — R60.0 LOCALIZED EDEMA: ICD-10-CM

## 2021-09-01 DIAGNOSIS — M72.2 PLANTAR FASCIITIS OF RIGHT FOOT: Primary | ICD-10-CM

## 2021-09-01 LAB
BILIRUBIN, POC: NORMAL
BLOOD URINE, POC: NORMAL
CLARITY, POC: CLEAR
COLOR, POC: YELLOW
GLUCOSE URINE, POC: NORMAL
KETONES, POC: NORMAL
LEUKOCYTE EST, POC: NORMAL
NITRITE, POC: NORMAL
PH, POC: 6
PROTEIN, POC: NORMAL
SPECIFIC GRAVITY, POC: 1
UROBILINOGEN, POC: 0.2

## 2021-09-01 PROCEDURE — 99213 OFFICE O/P EST LOW 20 MIN: CPT | Performed by: NURSE PRACTITIONER

## 2021-09-01 PROCEDURE — 29580 STRAPPING UNNA BOOT: CPT | Performed by: PODIATRIST

## 2021-09-01 PROCEDURE — 81002 URINALYSIS NONAUTO W/O SCOPE: CPT | Performed by: NURSE PRACTITIONER

## 2021-09-01 RX ORDER — NITROFURANTOIN 25; 75 MG/1; MG/1
100 CAPSULE ORAL 2 TIMES DAILY
Qty: 14 CAPSULE | Refills: 0 | Status: SHIPPED | OUTPATIENT
Start: 2021-09-01 | End: 2021-09-08

## 2021-09-01 NOTE — PATIENT INSTRUCTIONS
Patient Education        Urinary Tract Infection (UTI) in Women: Care Instructions  Overview     A urinary tract infection, or UTI, is a general term for an infection anywhere between the kidneys and the urethra (where urine comes out). Most UTIs are bladder infections. They often cause pain or burning when you urinate. UTIs are caused by bacteria and can be cured with antibiotics. Be sure to complete your treatment so that the infection does not get worse. Follow-up care is a key part of your treatment and safety. Be sure to make and go to all appointments, and call your doctor if you are having problems. It's also a good idea to know your test results and keep a list of the medicines you take. How can you care for yourself at home? · Take your antibiotics as directed. Do not stop taking them just because you feel better. You need to take the full course of antibiotics. · Drink extra water and other fluids for the next day or two. This will help make the urine less concentrated and help wash out the bacteria that are causing the infection. (If you have kidney, heart, or liver disease and have to limit fluids, talk with your doctor before you increase the amount of fluids you drink.)  · Avoid drinks that are carbonated or have caffeine. They can irritate the bladder. · Urinate often. Try to empty your bladder each time. · To relieve pain, take a hot bath or lay a heating pad set on low over your lower belly or genital area. Never go to sleep with a heating pad in place. To prevent UTIs  · Drink plenty of water each day. This helps you urinate often, which clears bacteria from your system. (If you have kidney, heart, or liver disease and have to limit fluids, talk with your doctor before you increase the amount of fluids you drink.)  · Urinate when you need to. · If you are sexually active, urinate right after you have sex. · Change sanitary pads often.   · Avoid douches, bubble baths, feminine hygiene sprays, and other feminine hygiene products that have deodorants. · After going to the bathroom, wipe from front to back. When should you call for help? Call your doctor now or seek immediate medical care if:    · Symptoms such as fever, chills, nausea, or vomiting get worse or appear for the first time.     · You have new pain in your back just below your rib cage. This is called flank pain.     · There is new blood or pus in your urine.     · You have any problems with your antibiotic medicine. Watch closely for changes in your health, and be sure to contact your doctor if:    · You are not getting better after taking an antibiotic for 2 days.     · Your symptoms go away but then come back. Where can you learn more? Go to https://Aqdot.[employer]. org and sign in to your MyWobile account. Enter Z491 in the [employer] box to learn more about \"Urinary Tract Infection (UTI) in Women: Care Instructions. \"     If you do not have an account, please click on the \"Sign Up Now\" link. Current as of: February 10, 2021               Content Version: 12.9  © 2006-2021 [employer]. Care instructions adapted under license by [facility] ([facility]). If you have questions about a medical condition or this instruction, always ask your healthcare professional. [place] [address] any warranty or liability for your use of this information. Patient Education        Urinary Tract Infection (UTI) in Women: Care Instructions  Overview     A urinary tract infection, or UTI, is a general term for an infection anywhere between the kidneys and the urethra (where urine comes out). Most UTIs are bladder infections. They often cause pain or burning when you urinate. UTIs are caused by bacteria and can be cured with antibiotics. Be sure to complete your treatment so that the infection does not get worse. Follow-up care is a key part of your treatment and safety.  Be sure to make and go to all appointments, and call your doctor if you are having problems. It's also a good idea to know your test results and keep a list of the medicines you take. How can you care for yourself at home? · Take your antibiotics as directed. Do not stop taking them just because you feel better. You need to take the full course of antibiotics. · Drink extra water and other fluids for the next day or two. This will help make the urine less concentrated and help wash out the bacteria that are causing the infection. (If you have kidney, heart, or liver disease and have to limit fluids, talk with your doctor before you increase the amount of fluids you drink.)  · Avoid drinks that are carbonated or have caffeine. They can irritate the bladder. · Urinate often. Try to empty your bladder each time. · To relieve pain, take a hot bath or lay a heating pad set on low over your lower belly or genital area. Never go to sleep with a heating pad in place. To prevent UTIs  · Drink plenty of water each day. This helps you urinate often, which clears bacteria from your system. (If you have kidney, heart, or liver disease and have to limit fluids, talk with your doctor before you increase the amount of fluids you drink.)  · Urinate when you need to. · If you are sexually active, urinate right after you have sex. · Change sanitary pads often. · Avoid douches, bubble baths, feminine hygiene sprays, and other feminine hygiene products that have deodorants. · After going to the bathroom, wipe from front to back. When should you call for help? Call your doctor now or seek immediate medical care if:    · Symptoms such as fever, chills, nausea, or vomiting get worse or appear for the first time.     · You have new pain in your back just below your rib cage. This is called flank pain.     · There is new blood or pus in your urine.     · You have any problems with your antibiotic medicine.    Watch closely for changes in your health, and be sure to contact your doctor if:    · You are not getting better after taking an antibiotic for 2 days.     · Your symptoms go away but then come back. Where can you learn more? Go to https://chpeyamilaeweb.[employer]Beat [email or web address]. org and sign in to your In Flow account. Enter H186 in the [facility] box to learn more about \"Urinary Tract Infection (UTI) in Women: Care Instructions. \"     If you do not have an account, please click on the \"Sign Up Now\" link. Current as of: February 10, 2021               Content Version: 12.9  © 4413-5194 [employer]wise[employer]. Care instructions adapted under license by [facility] ([facility]). If you have questions about a medical condition or this instruction, always ask your healthcare professional. [address] any warranty or liability for your use of this information.

## 2021-09-01 NOTE — PROGRESS NOTES
Patient is in today for 1 week right ankle pain. Patient says the unna wrap did help with her pain. Patient is able to walk on her ankle without issues.  pcp is AMBREEN Cortes - CNP  Last ov 8/16/21

## 2021-09-01 NOTE — PROGRESS NOTES
Chief Complaint:   Urinary Tract Infection (PRESSURE, LOWER BACK PAIN, URGENCY , BURNING AND PAIN STARTED A WEEK AGO )      History of Present Illness   Source of history provided by:  patient. Andrzej Turner is a 74 y.o. old female who has a past medical history of:   Past Medical History:   Diagnosis Date    Anxiety and depression     Bacteremia 5/26/2019    Chronic back pain     Diverticulitis     GERD (gastroesophageal reflux disease)     Gram-negative bacteremia 5/26/2019    Hypothyroidism     Lumbar spondylosis 8/11/2021    Neuropathy     Obesity     Rheumatoid arthritis involving multiple sites with positive rheumatoid factor ([facility] [address] 9/12/2017    Thyroid disease     Presents to the [facility] care with complaints of dysuria x 7 days. Reports associated frequency, urgency, and suprapubic pressure. Denies gross hematuria. Denies associated flank pain. Denies any fever, chills, vaginal discharge, vaginal bleeding, possibility of pregnancy, vomiting, diarrhea, or lethargy. No LMP recorded. Patient is postmenopausal.      ROS    Unless otherwise stated in this report or unable to obtain because of the patient's clinical or mental status as evidenced by the medical record, this patients's positive and negative responses for Review of Systems, constitutional, psych, eyes, ENT, cardiovascular, respiratory, gastrointestinal, neurological, genitourinary, musculoskeletal, integument systems and systems related to the presenting problem are either stated in the preceding or were not pertinent or were negative for the symptoms and/or complaints related to the medical problem.     Past Surgical history:   Past Surgical History:   Procedure Laterality Date    ANESTHESIA NERVE BLOCK Left 5/5/2020    LEFT L5 TRANSFORAMINAL EPIDURAL STEROID INJECTION performed by Eliane العلي DO at [address]  02/23/2017    robotic assisted cholecystectomy with IOC    COLONOSCOPY  09/11/2017    NERVE BLOCK Left 05/05/2020    NERVE BLOCK Left 05/05/2020    NERVE BLOCK Left 06/04/2020    transforaminal    NERVE BLOCK Left 07/14/2020    L4-5 transforaminal    OTHER SURGICAL HISTORY  04/11/2017    Laparoscopic Hand Assisted Open Sigmoid Colectomy with Colostomy     OTHER SURGICAL HISTORY  09/12/2017    laparscopic robotic assited colostomy reversed    PAIN MANAGEMENT PROCEDURE Left 6/4/2020    LEFT L4-5 TRANSFORAMINAL EPIDURAL STEROID INJECTION performed by Rosey Darden DO at [address] Left 7/14/2020    LEFT L4-5 TRANSFORAMINAL EPIDURAL STEROID INJECTION performed by Rosey Darden DO at [facility] History:  reports that she quit smoking about 34 years ago. She smoked 0.00 packs per day for 0.00 years. She has never used smokeless tobacco. She reports that she does not drink alcohol and does not use drugs. Family History: family history includes Breast Cancer in her mother; Diabetes in her mother; Heart Disease in her father; Other in her father. Allergies: Enbrel [etanercept], Humira [adalimumab], and Penicillins    Physical Exam         VS:  /80 (Site: Left Upper Arm, Position: Sitting, Cuff Size: Large Adult)   Pulse 84   Temp 96.8 °F (36 °C) (Temporal)   Resp 17   Ht 6' (1.829 m)   Wt (!) 357 lb (161.9 kg)   SpO2 98%   BMI 48.42 kg/m²    Oxygen Saturation Interpretation: Normal.    Constitutional:  A&Ox3, development consistent with age, NAD. Lungs:  CTAB without wheezing, rales, or rhonchi. Heart:  RRR without pathologic murmurs, rubs, or gallops. Abdomen: Soft, nondistended, with mild suprapubic tenderness. No rebound, rigidity, or guarding. BS+ X4. No organomegaly. Back: denies CVA tenderness. Skin:  Normal turgor. Warm, dry, without visible rash, unless noted elsewhere. Neurological:  Alert and oriented. Motor functions intact.   Responds to verbal commands. Lab / Imaging Results   (All laboratory and radiology results have been personally reviewed by myself)  Labs:  No results found for this visit on 09/01/21. Imaging: All Radiology results interpreted by Radiologist unless otherwise noted. No orders to display       Medical Decision Making:    Patient is well appearing, non toxic and appropriate for outpatient management. Plan is for symptom management and PCP follow up. Assessment / Plan     1. Acute cystitis with hematuria    - nitrofurantoin, macrocrystal-monohydrate, (MACROBID) 100 MG capsule; Take 1 capsule by mouth 2 times daily for 7 days  Dispense: 14 capsule; Refill: 0  - POCT Urinalysis no Micro    Unable to obtain a sample for culture. Patient to call if no improvement in symptoms in 24-48 hours, she will then bring in a specimen as needed. UA appears positive for a UTI. Script written for as above, side effects discussed. Increase fluids and rest. F/u PCP in 3-5 days if symptoms persist. ED sooner if symptoms worsen or change. ED immediately with the development of fever, shaking chills, body aches, flank pain, vomiting, CP, or SOB. Pt is in agreement with this care plan. All questions answered.       Chris Dumont, [facility] Nurse Practitioner

## 2021-09-02 NOTE — PROGRESS NOTES
21     Viola Sargent    : 1955   Sex: female    Age: 74 y.o. Patient's PCP/Provider is:  AMBREEN De Jesus CNP    Subjective:  Patient is seen today for follow-up regarding continued evaluation regarding plantar fascial issues right lower extremity. Patient noticed approximately 75% improvement with compression dressing applied. Overall patient is doing well at this time. Has been increasing her ambulatory activities to tolerance. Chief Complaint   Patient presents with    Ankle Pain     right ankle        ROS:  Const: Positives and pertinent negatives as per HPI. Musculo: Denies symptoms other than stated above. Neuro: Denies symptoms other than stated above. Skin: Denies symptoms other than stated above.     Current Medications:    Current Outpatient Medications:     doxycycline monohydrate (MONODOX) 100 MG capsule, take 1 capsule by mouth twice a day, Disp: , Rfl:     sertraline (ZOLOFT) 100 MG tablet, Take 1 tablet by mouth nightly, Disp: 90 tablet, Rfl: 1    omeprazole (PRILOSEC) 40 MG delayed release capsule, Take 1 capsule by mouth daily as needed (heartburn), Disp: 90 capsule, Rfl: 1    levothyroxine (SYNTHROID) 137 MCG tablet, Take 1 tablet by mouth Daily, Disp: 90 tablet, Rfl: 1    Handicap Placard MISC, by Does not apply route Start 2021 and expires 2026, Disp: 1 each, Rfl: 0    metroNIDAZOLE (METROCREAM) 0.75 % cream, Apply 1 Dose topically 2 times daily, Disp: , Rfl:     folic acid (FOLVITE) 1 MG tablet, take 1 tablet by mouth once daily, Disp: 30 tablet, Rfl: 6    prednisoLONE acetate (PRED FORTE) 1 % ophthalmic suspension, instill 1 drop INTO AFFECTED EYE(S) twice a day, Disp: , Rfl:     Upadacitinib ER 15 MG TB24, Take 15 mg by mouth daily, Disp: , Rfl:     Cholecalciferol 50 MCG (2000) TABS, Take 1 tablet by mouth daily, Disp: 30 tablet, Rfl: 6    methotrexate (RHEUMATREX) 2.5 MG chemo tablet, , Disp: , Rfl:     Multiple Vitamins-Minerals (MULTI-VITAMIN GUMMIES PO), Take 2 tablets by mouth daily, Disp: , Rfl:     nitrofurantoin, macrocrystal-monohydrate, (MACROBID) 100 MG capsule, Take 1 capsule by mouth 2 times daily for 7 days, Disp: 14 capsule, Rfl: 0    Allergies: Allergies   Allergen Reactions    Enbrel [Etanercept]      Swelling    Humira [Adalimumab]      Swelling     Penicillins Swelling and Rash     n/v       Vitals:    09/01/21 1436   Weight: (!) 357 lb (161.9 kg)   Height: 6' (1.829 m)       Exam:  Neurovascular status unchanged. Mild edematous issues noted into the right lower extremity. Plantar fascial issues are improved right lower extremity. Increased range of motion noted right ankle and subtalar joint. Diagnostic Studies:     XR ANKLE RIGHT (MIN 3 VIEWS)    Result Date: 8/12/2021  EXAMINATION: THREE XRAY VIEWS OF THE RIGHT ANKLE 8/11/2021 11:05 am COMPARISON: None. HISTORY: ORDERING SYSTEM PROVIDED HISTORY: Chronic pain of right ankle FINDINGS: No widening of the ankle mortise is evident. There are plantar and Achilles heel spurs. No visible fracture or dislocation. Achilles and plantar heel spurs. Procedures: An unna boot  compressive wrap was applied to the right lower extremity. It's purpose is to  decrease  the amount of edema present, and to allow proper healing of the soft tissues. The patient was instructed to keep the unna boot clean, dry and intact until the next follow up visit. The patient was instructed to look for signs of redness, irritation, blistering and pain. If these or any other symptoms were to develop, they were advised to contact the office immediately for reevaluation. Plan Per Assessment  Young Hua was seen today for ankle pain. Diagnoses and all orders for this visit:    Plantar fasciitis of right foot    Localized edema    Pain in right ankle and joints of right foot    Difficulty walking      1. Evaluation and management  2.  Compression dressing was reapplied to the symptomatic right lower extremity, as described above. 3. She was advised on appropriate shoe gear and continued compression therapy. 4. Patient will be followed up at a later date for continued evaluation and management. Seen By:    Destiny Baker DPM    Electronically signed by Destiny Baker DPM on 9/1/2021 at 9:31 PM    This note was created using voice recognition software. The note was reviewed however may contain grammatical errors.

## 2021-09-21 LAB
ALBUMIN SERPL-MCNC: 4.5 G/DL
ALP BLD-CCNC: 187 U/L
ALT SERPL-CCNC: 33 U/L
ANION GAP SERPL CALCULATED.3IONS-SCNC: NORMAL MMOL/L
AST SERPL-CCNC: 29 U/L
BASOPHILS ABSOLUTE: 30 /ΜL
BASOPHILS RELATIVE PERCENT: 0.5 %
BILIRUB SERPL-MCNC: 0.7 MG/DL (ref 0.1–1.4)
BUN BLDV-MCNC: 21 MG/DL
CALCIUM SERPL-MCNC: 9.3 MG/DL
CHLORIDE BLD-SCNC: 103 MMOL/L
CO2: 27 MMOL/L
CREAT SERPL-MCNC: 0.89 MG/DL
EOSINOPHILS ABSOLUTE: 42 /ΜL
EOSINOPHILS RELATIVE PERCENT: 0.7 %
GFR CALCULATED: NORMAL
GLUCOSE BLD-MCNC: 126 MG/DL
HCT VFR BLD CALC: 39.3 % (ref 36–46)
HEMOGLOBIN: 13.5 G/DL (ref 12–16)
LYMPHOCYTES ABSOLUTE: 630 /ΜL
LYMPHOCYTES RELATIVE PERCENT: 10.5 %
MCH RBC QN AUTO: 30.3 PG
MCHC RBC AUTO-ENTMCNC: 34.4 G/DL
MCV RBC AUTO: 88.3 FL
MONOCYTES ABSOLUTE: 420 /ΜL
MONOCYTES RELATIVE PERCENT: 7 %
NEUTROPHILS ABSOLUTE: 4878 /ΜL
NEUTROPHILS RELATIVE PERCENT: 81.3 %
PLATELET # BLD: 265 K/ΜL
PMV BLD AUTO: 9.8 FL
POTASSIUM SERPL-SCNC: 4.4 MMOL/L
RBC # BLD: 4.45 10^6/ΜL
SODIUM BLD-SCNC: 140 MMOL/L
TOTAL PROTEIN: 7.1
WBC # BLD: 6 10^3/ML

## 2021-11-15 ENCOUNTER — HOSPITAL ENCOUNTER (EMERGENCY)
Age: 66
Discharge: HOME | End: 2021-11-16
Attending: EMERGENCY MEDICINE
Payer: MEDICARE

## 2021-11-15 ENCOUNTER — APPOINTMENT (OUTPATIENT)
Dept: GENERAL RADIOLOGY | Age: 66
End: 2021-11-15
Payer: MEDICARE

## 2021-11-15 ENCOUNTER — OFFICE VISIT (OUTPATIENT)
Dept: FAMILY MEDICINE CLINIC | Age: 66
End: 2021-11-15
Payer: MEDICARE

## 2021-11-15 VITALS
OXYGEN SATURATION: 96 % | HEART RATE: 71 BPM | WEIGHT: 293 LBS | DIASTOLIC BLOOD PRESSURE: 78 MMHG | SYSTOLIC BLOOD PRESSURE: 126 MMHG | BODY MASS INDEX: 39.68 KG/M2 | HEIGHT: 72 IN | TEMPERATURE: 97.6 F | RESPIRATION RATE: 21 BRPM

## 2021-11-15 DIAGNOSIS — R06.09 DYSPNEA ON EXERTION: ICD-10-CM

## 2021-11-15 DIAGNOSIS — R35.0 URINARY FREQUENCY: ICD-10-CM

## 2021-11-15 DIAGNOSIS — R06.09 DOE (DYSPNEA ON EXERTION): Primary | ICD-10-CM

## 2021-11-15 DIAGNOSIS — I50.9 ACUTE CONGESTIVE HEART FAILURE, UNSPECIFIED HEART FAILURE TYPE (HCC): Primary | ICD-10-CM

## 2021-11-15 DIAGNOSIS — R73.01 IMPAIRED FASTING GLUCOSE: ICD-10-CM

## 2021-11-15 LAB
ANION GAP SERPL CALCULATED.3IONS-SCNC: 12 MMOL/L (ref 7–16)
BASOPHILS ABSOLUTE: 0.03 E9/L (ref 0–0.2)
BASOPHILS RELATIVE PERCENT: 0.4 % (ref 0–2)
BILIRUBIN, POC: NORMAL
BLOOD URINE, POC: NEGATIVE
BUN BLDV-MCNC: 22 MG/DL (ref 6–23)
CALCIUM SERPL-MCNC: 9.3 MG/DL (ref 8.6–10.2)
CHLORIDE BLD-SCNC: 103 MMOL/L (ref 98–107)
CHP ED QC CHECK: NORMAL
CLARITY, POC: NORMAL
CO2: 26 MMOL/L (ref 22–29)
COLOR, POC: YELLOW
CREAT SERPL-MCNC: 0.7 MG/DL (ref 0.5–1)
D DIMER: <200 NG/ML DDU
EOSINOPHILS ABSOLUTE: 0.08 E9/L (ref 0.05–0.5)
EOSINOPHILS RELATIVE PERCENT: 1.2 % (ref 0–6)
GFR AFRICAN AMERICAN: >60
GFR NON-AFRICAN AMERICAN: >60 ML/MIN/1.73
GLUCOSE BLD-MCNC: 131 MG/DL (ref 74–99)
GLUCOSE BLD-MCNC: 97 MG/DL
GLUCOSE URINE, POC: NEGATIVE
HBA1C MFR BLD: 5.5 %
HCT VFR BLD CALC: 40.1 % (ref 34–48)
HEMOGLOBIN: 13.3 G/DL (ref 11.5–15.5)
IMMATURE GRANULOCYTES #: 0.12 E9/L
IMMATURE GRANULOCYTES %: 1.7 % (ref 0–5)
KETONES, POC: NEGATIVE
LEUKOCYTE EST, POC: NEGATIVE
LYMPHOCYTES ABSOLUTE: 0.61 E9/L (ref 1.5–4)
LYMPHOCYTES RELATIVE PERCENT: 8.8 % (ref 20–42)
MCH RBC QN AUTO: 29.2 PG (ref 26–35)
MCHC RBC AUTO-ENTMCNC: 33.2 % (ref 32–34.5)
MCV RBC AUTO: 88.1 FL (ref 80–99.9)
MONOCYTES ABSOLUTE: 0.57 E9/L (ref 0.1–0.95)
MONOCYTES RELATIVE PERCENT: 8.2 % (ref 2–12)
NEUTROPHILS ABSOLUTE: 5.52 E9/L (ref 1.8–7.3)
NEUTROPHILS RELATIVE PERCENT: 79.7 % (ref 43–80)
NITRITE, POC: NEGATIVE
PDW BLD-RTO: 13.6 FL (ref 11.5–15)
PH, POC: 6
PLATELET # BLD: 272 E9/L (ref 130–450)
PMV BLD AUTO: 9.5 FL (ref 7–12)
POTASSIUM REFLEX MAGNESIUM: 3.6 MMOL/L (ref 3.5–5)
PRO-BNP: 148 PG/ML (ref 0–125)
PROTEIN, POC: NORMAL
RBC # BLD: 4.55 E12/L (ref 3.5–5.5)
SODIUM BLD-SCNC: 141 MMOL/L (ref 132–146)
SPECIFIC GRAVITY, POC: >=1.03
TROPONIN, HIGH SENSITIVITY: 17 NG/L (ref 0–9)
TROPONIN, HIGH SENSITIVITY: 19 NG/L (ref 0–9)
UROBILINOGEN, POC: 0.2
WBC # BLD: 6.9 E9/L (ref 4.5–11.5)

## 2021-11-15 PROCEDURE — 83036 HEMOGLOBIN GLYCOSYLATED A1C: CPT | Performed by: NURSE PRACTITIONER

## 2021-11-15 PROCEDURE — 71046 X-RAY EXAM CHEST 2 VIEWS: CPT

## 2021-11-15 PROCEDURE — 93000 ELECTROCARDIOGRAM COMPLETE: CPT | Performed by: NURSE PRACTITIONER

## 2021-11-15 PROCEDURE — 81002 URINALYSIS NONAUTO W/O SCOPE: CPT | Performed by: NURSE PRACTITIONER

## 2021-11-15 PROCEDURE — 36415 COLL VENOUS BLD VENIPUNCTURE: CPT

## 2021-11-15 PROCEDURE — 99215 OFFICE O/P EST HI 40 MIN: CPT | Performed by: NURSE PRACTITIONER

## 2021-11-15 PROCEDURE — 82962 GLUCOSE BLOOD TEST: CPT | Performed by: NURSE PRACTITIONER

## 2021-11-15 PROCEDURE — 85378 FIBRIN DEGRADE SEMIQUANT: CPT

## 2021-11-15 PROCEDURE — 99283 EMERGENCY DEPT VISIT LOW MDM: CPT

## 2021-11-15 PROCEDURE — 80048 BASIC METABOLIC PNL TOTAL CA: CPT

## 2021-11-15 PROCEDURE — 84484 ASSAY OF TROPONIN QUANT: CPT

## 2021-11-15 PROCEDURE — 93005 ELECTROCARDIOGRAM TRACING: CPT | Performed by: NURSE PRACTITIONER

## 2021-11-15 PROCEDURE — 85025 COMPLETE CBC W/AUTO DIFF WBC: CPT

## 2021-11-15 PROCEDURE — 83880 ASSAY OF NATRIURETIC PEPTIDE: CPT

## 2021-11-15 ASSESSMENT — ENCOUNTER SYMPTOMS
SHORTNESS OF BREATH: 1
CHEST TIGHTNESS: 0
SHORTNESS OF BREATH: 1
WHEEZING: 0
DIARRHEA: 0
FACIAL SWELLING: 0
NAUSEA: 0
BACK PAIN: 0
SINUS PRESSURE: 0
WHEEZING: 0
TROUBLE SWALLOWING: 0
DIARRHEA: 0
RHINORRHEA: 0
CHEST TIGHTNESS: 0
VOICE CHANGE: 0
BLOOD IN STOOL: 0
COUGH: 0
SINUS PAIN: 0
CONSTIPATION: 0
VOMITING: 0
ABDOMINAL PAIN: 0
VOMITING: 0
SORE THROAT: 0
BACK PAIN: 0
COUGH: 0
ABDOMINAL PAIN: 0
COLOR CHANGE: 0
NAUSEA: 0
CONSTIPATION: 0

## 2021-11-15 NOTE — PROGRESS NOTES
OFFICE PROGRESS NOTE  [facility] [address]  [address] [facility] [place] [id number]  Dept: [phone]   Chief Complaint   Patient presents with    Shortness of Breath    Urinary Frequency     no pain, no burning,        ASSESSMENT/PLAN   1. VALDES (dyspnea on exertion)  Assessment & Plan:  New Uncontrolled, EKG reviewed with DR Elijah Smith and case discussed, report called to [address] [place] [address] ER concern for PE due to how quickly she desaturates. Orders:  -     CBC Auto Differential; Future  -     Comprehensive Metabolic Panel; Future  -     EKG 12 Lead  -     Brain Natriuretic Peptide; Future  -     XR CHEST (2 VW); Future  2. Urinary frequency  -     Culture, Urine; Future  -     POCT Glucose  -     POCT glycosylated hemoglobin (Hb A1C)  -     POCT Urinalysis no Micro  3. Impaired fasting glucose  -     POCT Glucose  -     POCT glycosylated hemoglobin (Hb A1C)         Reviewed notes from echo 2019          Return for follow up after ER. HPI:   Here today for complaints of SOB x 2 weeks she had prior to going to [place]. She denies any leg swelling but does complain of being bloated states it feels like it is sitting in the upper chest. Denies SOB at rest but does become SOB with any activity. This is new and she isn't aware of having Covid in the past. She has no problems being SOB with laying flat. Her oxygen today was at 84% when she came in and Sasha put her on oxygen 2 L via N/C and she came up 96%  Oxygen removed and she walked to the restroom and collected urine specimen when she returned her oxygen was at 83% then after sitting she returned to 96%. Discussed I am concerned she has a PE she states there is no way I have a blood clot,  will check EKG and CXR after review of EKG discussed with Dr Elijah Smith and sent her to [address] [place] [address] for further evaluation. Daughter comes in and she agrees with going to [facility]. Report called and patient was driving with daughter.        She is complaining of frequent urination started again a couple of weeks ago, did have UTI in September was treated and then returned. She is getting up at night to urinate anywhere from 2 - 4 times a night. She denies any other symptoms no back pain, no vaginal discharge. She has gained 7 pounds while on vacation and she returned last night. Current Outpatient Medications:     doxycycline monohydrate (MONODOX) 100 MG capsule, take 1 capsule by mouth twice a day, Disp: , Rfl:     sertraline (ZOLOFT) 100 MG tablet, Take 1 tablet by mouth nightly, Disp: 90 tablet, Rfl: 1    omeprazole (PRILOSEC) 40 MG delayed release capsule, Take 1 capsule by mouth daily as needed (heartburn), Disp: 90 capsule, Rfl: 1    levothyroxine (SYNTHROID) 137 MCG tablet, Take 1 tablet by mouth Daily, Disp: 90 tablet, Rfl: 1    Handicap Placard MISC, by Does not apply route Start 7/13/2021 and expires 7/12/2026, Disp: 1 each, Rfl: 0    metroNIDAZOLE (METROCREAM) 0.75 % cream, Apply 1 Dose topically 2 times daily, Disp: , Rfl:     folic acid (FOLVITE) 1 MG tablet, take 1 tablet by mouth once daily, Disp: 30 tablet, Rfl: 6    prednisoLONE acetate (PRED FORTE) 1 % ophthalmic suspension, instill 1 drop INTO AFFECTED EYE(S) twice a day, Disp: , Rfl:     Upadacitinib ER 15 MG TB24, Take 15 mg by mouth daily, Disp: , Rfl:     Cholecalciferol 50 MCG (2000 UT) TABS, Take 1 tablet by mouth daily, Disp: 30 tablet, Rfl: 6    methotrexate (RHEUMATREX) 2.5 MG chemo tablet, , Disp: , Rfl:     Multiple Vitamins-Minerals (MULTI-VITAMIN GUMMIES PO), Take 2 tablets by mouth daily, Disp: , Rfl:       Surgical History:  has a past surgical history that includes Appendectomy; Tonsillectomy; Cholecystectomy, laparoscopic (02/23/2017); other surgical history (04/11/2017); Colonoscopy (09/11/2017); other surgical history (09/12/2017); Nerve Block (Left, 05/05/2020); Nerve Block (Left, 05/05/2020); Anesthesia Nerve Block (Left, 5/5/2020);  Nerve Block (Left, 06/04/2020); Pain management procedure (Left, 6/4/2020); Nerve Block (Left, 07/14/2020); and Pain management procedure (Left, 7/14/2020). Social History:  reports that she quit smoking about 34 years ago. She smoked 0.00 packs per day for 0.00 years. She has never used smokeless tobacco. She reports that she does not drink alcohol and does not use drugs. Family History: family history includes Breast Cancer in her mother; Diabetes in her mother; Heart Disease in her father; Other in her father. I have reviewed Rosalie's allergies, medications, problem list, medical, social and family history and have updated as needed in the electronic medical record    Review of Systems   Constitutional: Negative for activity change, appetite change, chills, diaphoresis, fatigue, fever and unexpected weight change. HENT: Negative for congestion, dental problem, drooling, ear discharge, ear pain, facial swelling, hearing loss, mouth sores, nosebleeds, postnasal drip, rhinorrhea, sinus pressure, sinus pain, sneezing, sore throat, tinnitus, trouble swallowing and voice change. Eyes: Negative for visual disturbance. Respiratory: Positive for shortness of breath. Negative for cough, chest tightness and wheezing. Cardiovascular: Negative for chest pain, palpitations and leg swelling. Gastrointestinal: Negative for abdominal pain, constipation, diarrhea, nausea and vomiting. Endocrine: Negative for cold intolerance, heat intolerance, polydipsia, polyphagia and polyuria. Genitourinary: Positive for frequency. Negative for difficulty urinating and urgency. Nocturia 2 - 4 times   Musculoskeletal: Negative for arthralgias, back pain, gait problem, joint swelling, myalgias, neck pain and neck stiffness. Skin: Negative for color change, pallor, rash and wound. Allergic/Immunologic: Negative for environmental allergies, food allergies and immunocompromised state.    Neurological: Negative for dizziness, tremors, seizures, syncope, facial asymmetry, speech difficulty, weakness, light-headedness, numbness and headaches. Hematological: Negative for adenopathy. Does not bruise/bleed easily. Psychiatric/Behavioral: Negative for agitation, behavioral problems, confusion, decreased concentration, dysphoric mood, hallucinations, self-injury, sleep disturbance and suicidal ideas. The patient is not nervous/anxious and is not hyperactive. OBJECTIVE:     VS:  Wt Readings from Last 3 Encounters:   11/15/21 (!) 364 lb (165.1 kg)   09/01/21 (!) 357 lb (161.9 kg)   09/01/21 (!) 357 lb (161.9 kg)                       Vitals:    11/15/21 1438 11/15/21 1505   BP: 126/78    Pulse: 109 71   Resp: 21    Temp: 97.6 °F (36.4 °C)    SpO2: (!) 84% 96%   Weight: (!) 364 lb (165.1 kg)    Height: 6' (1.829 m)        General: Alert and oriented to person, place, and time, well developed and well nourished, morbidly obese,  in no acute distress  SKIN: Warm and dry, intact without any rash, masses or lesions  HEAD: normocephalic, atraumatic  Eyes: sclera/conjunctiva clear, PERRLA, EOMI's intact  ENT: tympanic membranes, external ear and ear canal normal bilaterally, normal hearing, Nose without deformity, nasal mucosa and turbinates normal without polyps   Throat: clear, tongue midline, no  drainage, no masses or lesions noted, good dentition  Neck: supple and non-tender without mass, trachea midline, no cervical lymphadenopathy, no bruit, no thyromegaly or nodules  Cardiovascular: regular rate and regular rhythm, normal S1 and S2,  no murmurs, rubs, clicks, or gallop. Distal pulses intact, no carotid bruits.  No edema  Pulmonary/Chest: very diminished in lower lung fields to auscultation bilaterally, no wheezes, rales or rhonchi, diminished air movement, no respiratory distress  Abdomen: soft, non-tender, non-distended, normal bowel sounds, no masses or hepatosplenomegaly  Musculoskeletal: Normal ROM, no joint swelling, deformity or

## 2021-11-15 NOTE — ASSESSMENT & PLAN NOTE
New Uncontrolled, EKG reviewed with DR Shaun Howell and case discussed, report called to [address] [place] [address] ER concern for PE due to how quickly she desaturates.

## 2021-11-15 NOTE — ED TRIAGE NOTES
FIRST PROVIDER CONTACT ASSESSMENT NOTE           Department of Emergency Medicine                 First Provider Note            11/15/21  4:31 PM EST    Date of Encounter: No admission date for patient encounter. Patient Name: Martin Martinez  : 1955  MRN: [id number]    Chief Complaint: Shortness of Breath (increases with ambulation. Times three weeks)      History of Present Illness:   Martin Martinez is a 74 y.o. female who presents to the ED for dyspnea with exertion x 3 weeks. PCP advised her to come to ED to rule out PE. No history of dvt/pe. Denies chest pain. Focused Physical Exam:  VS:    ED Triage Vitals [11/15/21 1630]   BP Temp Temp Source Pulse Resp SpO2 Height Weight   -- 98.3 °F (36.8 °C) Temporal 89 20 95 % -- --        Physical Ex: Constitutional: Alert and non-toxic. Medical History:  has a past medical history of Anxiety and depression, Bacteremia, Chronic back pain, Diverticulitis, GERD (gastroesophageal reflux disease), Gram-negative bacteremia, Hypothyroidism, Lumbar spondylosis, Neuropathy, Obesity, Rheumatoid arthritis involving multiple sites with positive rheumatoid factor ([facility] [address] and Thyroid disease. Surgical History:  has a past surgical history that includes Appendectomy; Tonsillectomy; Cholecystectomy, laparoscopic (2017); other surgical history (2017); Colonoscopy (2017); other surgical history (2017); Nerve Block (Left, 2020); Nerve Block (Left, 2020); Anesthesia Nerve Block (Left, 2020); Nerve Block (Left, 2020); Pain management procedure (Left, 2020); Nerve Block (Left, 2020); and Pain management procedure (Left, 2020). Social History:  reports that she quit smoking about 34 years ago. She smoked 0.00 packs per day for 0.00 years. She has never used smokeless tobacco. She reports that she does not drink alcohol and does not use drugs.   Family History: family history includes Breast Cancer in her mother;

## 2021-11-16 VITALS
DIASTOLIC BLOOD PRESSURE: 80 MMHG | TEMPERATURE: 98.3 F | OXYGEN SATURATION: 96 % | HEART RATE: 72 BPM | RESPIRATION RATE: 18 BRPM | SYSTOLIC BLOOD PRESSURE: 146 MMHG

## 2021-11-16 DIAGNOSIS — I50.9 CONGESTIVE HEART FAILURE, UNSPECIFIED HF CHRONICITY, UNSPECIFIED HEART FAILURE TYPE (HCC): Primary | ICD-10-CM

## 2021-11-16 DIAGNOSIS — R06.09 DOE (DYSPNEA ON EXERTION): ICD-10-CM

## 2021-11-16 LAB
EKG ATRIAL RATE: 69 BPM
EKG P AXIS: 64 DEGREES
EKG P-R INTERVAL: 154 MS
EKG Q-T INTERVAL: 432 MS
EKG QRS DURATION: 84 MS
EKG QTC CALCULATION (BAZETT): 462 MS
EKG R AXIS: 21 DEGREES
EKG T AXIS: 72 DEGREES
EKG VENTRICULAR RATE: 69 BPM

## 2021-11-16 PROCEDURE — 6370000000 HC RX 637 (ALT 250 FOR IP)

## 2021-11-16 RX ORDER — FUROSEMIDE 20 MG/1
20 TABLET ORAL ONCE
Status: COMPLETED | OUTPATIENT
Start: 2021-11-16 | End: 2021-11-16

## 2021-11-16 RX ORDER — FUROSEMIDE 20 MG/1
20 TABLET ORAL DAILY
Qty: 7 TABLET | Refills: 0 | Status: SHIPPED | OUTPATIENT
Start: 2021-11-16 | End: 2022-01-05

## 2021-11-16 RX ADMIN — FUROSEMIDE 20 MG: 20 TABLET ORAL at 00:10

## 2021-11-16 NOTE — ED PROVIDER NOTES
[address]      Pt Name: Selin Bone  MRN: [id number]  [place] 1955  Date of evaluation: 11/15/2021      CHIEF COMPLAINT       Chief Complaint   Patient presents with    Shortness of Breath     increases with ambulation. Times three weeks        HPI  Selin Bone is a 74 y.o. female presents with increased shortness of breath for the past 2 to 3 weeks. Patient states her shortness of breath is worse on exertion alleviated by rest.  Patient states that she went to her primary care doctor today morning to measure her pulse oximeter and went from walking to the car she was mid 80s. She states that she improved immediately after resting and after 2 L oxygen, she was weaned off of oxygen and remained saturating in the mid 90s. Patient also complains of chronic leg swelling, worse when she is on her feet and alleviated by lying flat. Describes her symptoms as worsening, constant, mild in severity. Denies any fever, chills, n/v, headache, dizziness, weakness, cp, palpitations, cough, abd pain, dysuria, hematuria, diarrhea, constipation. Patient states that she had a recent long trip in the car 9 hours coming from [place], she denies any leg or calf tenderness. Except as noted above the remainder of the review of systems was reviewed and negative. Review of Systems   Constitutional: Negative for activity change, appetite change, chills, fatigue and fever. HENT: Negative for congestion, nosebleeds and tinnitus. Eyes: Negative for visual disturbance. Respiratory: Positive for shortness of breath. Negative for cough, chest tightness and wheezing. Cardiovascular: Positive for leg swelling. Negative for chest pain and palpitations. Gastrointestinal: Negative for abdominal pain, blood in stool, constipation, diarrhea, nausea and vomiting. Endocrine: Negative for polydipsia and polyphagia.    Genitourinary: Negative for dysuria, flank pain, hematuria, vaginal bleeding, vaginal discharge and vaginal pain. Musculoskeletal: Negative for back pain, gait problem, joint swelling and myalgias. Skin: Negative for rash. Allergic/Immunologic: Negative for immunocompromised state. Neurological: Negative for dizziness, syncope, weakness, numbness and headaches. Hematological: Negative for adenopathy. Psychiatric/Behavioral: Negative for behavioral problems, confusion and hallucinations. Physical Exam  Vitals reviewed. Constitutional:       General: She is not in acute distress. Appearance: Normal appearance. She is obese. She is not ill-appearing, toxic-appearing or diaphoretic. HENT:      Head: Normocephalic and atraumatic. Right Ear: External ear normal.      Left Ear: External ear normal.      Nose: Nose normal. No congestion or rhinorrhea. Mouth/Throat:      Mouth: Mucous membranes are moist.      Pharynx: Oropharynx is clear. No oropharyngeal exudate or posterior oropharyngeal erythema. Eyes:      Conjunctiva/sclera: Conjunctivae normal.      Pupils: Pupils are equal, round, and reactive to light. Cardiovascular:      Rate and Rhythm: Normal rate and regular rhythm. Pulses: Normal pulses. Heart sounds: Normal heart sounds. Pulmonary:      Effort: Pulmonary effort is normal. No tachypnea, accessory muscle usage or respiratory distress. Breath sounds: No stridor. Decreased breath sounds present. No wheezing, rhonchi or rales. Abdominal:      General: Abdomen is flat. Bowel sounds are normal. There is no distension. Palpations: Abdomen is soft. There is no mass. Tenderness: There is no abdominal tenderness. There is no right CVA tenderness, left CVA tenderness or guarding. Hernia: No hernia is present. Musculoskeletal:      Cervical back: Normal range of motion. Right lower leg: No tenderness. 1+ Pitting Edema present.       Left lower leg: No tenderness. 1+ Pitting Edema present. Skin:     General: Skin is warm and dry. Capillary Refill: Capillary refill takes less than 2 seconds. Neurological:      General: No focal deficit present. Mental Status: She is alert and oriented to person, place, and time. Mental status is at baseline. Psychiatric:         Mood and Affect: Mood normal.         Behavior: Behavior normal.         Thought Content: Thought content normal.          Procedures     EKG: This EKG is signed by emergency department physician. Rate: 69  Rhythm: Sinus  Interpretation: no acute changes  Comparison: was normal     MDM      74 y.o. female presents with increased shortness of breath for the past 2 to 3 weeks. Patient states her shortness of breath is worse on exertion alleviated by rest. While in the ED patient was hypertensive but otherwise hemodynamically stable, nontoxic-appearing, in no respiratory distress. Physical exam remarkable for +1 pitting edema bilateral lower extremity, minor decrease of breath sounds possibly due to body habitus, no wheezes rhonchi or rales appreciated. Labs remarkable for mild elevation of BNP, initial Trop of 19 but trending down. Dimer negative. Chest x-ray unremarkable for any acute pathologies. EKG normal sinus with no signs of acute ischemia. Patient ambulated in the ED remained above 93%. Dose of Lasix given while in the ED. Tereza Blair Patient feels comfortable going home with a Lasix prescription and will follow up outpatient with PCP. Patient understands to return to the ED in case of worsening symptoms. ED Course as of 11/16/21 0008   Mon Nov 15, 2021   3125 ATTENDING PROVIDER ATTESTATION:     I have personally performed and/or participated in the history, exam, medical decision making, and procedures and agree with all pertinent clinical information unless otherwise noted.       I have also reviewed and agree with the past medical, family and social history unless otherwise noted.    I have discussed this patient in detail with the resident, and provided the instruction and education regarding patient here complaining of shortness of breath worse with exertion for the last 4 weeks. Did recently travel to [place] however traveled there a week or 2 ago and was symptomatic before going. No unilateral leg swelling. No pain with breathing. No calf pain. No chest pain. No lightheadedness or syncope. No history of blood clots. No fevers or URI symptoms. .  My findings/plan: Patient morbidly obese in no acute distress. Heart rate regular. Lungs are clear anteriorly with generally mildly diminished breath sounds. She has mild +1 edema the bilateral lower extremities with no unilateral leg swelling and no calf pain. [NC]      ED Course User Index  [NC] Chioma Victoria, DO       --------------------------------------------- PAST HISTORY ---------------------------------------------  Past Medical History:  has a past medical history of Anxiety and depression, Bacteremia, Chronic back pain, Diverticulitis, GERD (gastroesophageal reflux disease), Gram-negative bacteremia, Hypothyroidism, Lumbar spondylosis, Neuropathy, Obesity, Rheumatoid arthritis involving multiple sites with positive rheumatoid factor ([facility] [address] and Thyroid disease. Past Surgical History:  has a past surgical history that includes Appendectomy; Tonsillectomy; Cholecystectomy, laparoscopic (02/23/2017); other surgical history (04/11/2017); Colonoscopy (09/11/2017); other surgical history (09/12/2017); Nerve Block (Left, 05/05/2020); Nerve Block (Left, 05/05/2020); Anesthesia Nerve Block (Left, 5/5/2020); Nerve Block (Left, 06/04/2020); Pain management procedure (Left, 6/4/2020); Nerve Block (Left, 07/14/2020); and Pain management procedure (Left, 7/14/2020). Social History:  reports that she quit smoking about 34 years ago. She smoked 0.00 packs per day for 0.00 years.  She has never used smokeless tobacco. She reports that she does not drink alcohol and does not use drugs. Family History: family history includes Breast Cancer in her mother; Diabetes in her mother; Heart Disease in her father; Other in her father. The patients home medications have been reviewed.     Allergies: Enbrel [etanercept], Humira [adalimumab], and Penicillins    -------------------------------------------------- RESULTS -------------------------------------------------  Labs:  Results for orders placed or performed during the hospital encounter of 11/15/21   CBC Auto Differential   Result Value Ref Range    WBC 6.9 4.5 - 11.5 E9/L    RBC 4.55 3.50 - 5.50 E12/L    Hemoglobin 13.3 11.5 - 15.5 g/dL    Hematocrit 40.1 34.0 - 48.0 %    MCV 88.1 80.0 - 99.9 fL    MCH 29.2 26.0 - 35.0 pg    MCHC 33.2 32.0 - 34.5 %    RDW 13.6 11.5 - 15.0 fL    Platelets 680 391 - 727 E9/L    MPV 9.5 7.0 - 12.0 fL    Neutrophils % 79.7 43.0 - 80.0 %    Immature Granulocytes % 1.7 0.0 - 5.0 %    Lymphocytes % 8.8 (L) 20.0 - 42.0 %    Monocytes % 8.2 2.0 - 12.0 %    Eosinophils % 1.2 0.0 - 6.0 %    Basophils % 0.4 0.0 - 2.0 %    Neutrophils Absolute 5.52 1.80 - 7.30 E9/L    Immature Granulocytes # 0.12 E9/L    Lymphocytes Absolute 0.61 (L) 1.50 - 4.00 E9/L    Monocytes Absolute 0.57 0.10 - 0.95 E9/L    Eosinophils Absolute 0.08 0.05 - 0.50 E9/L    Basophils Absolute 0.03 0.00 - 0.20 U0/M   Basic Metabolic Panel w/ Reflex to MG   Result Value Ref Range    Sodium 141 132 - 146 mmol/L    Potassium reflex Magnesium 3.6 3.5 - 5.0 mmol/L    Chloride 103 98 - 107 mmol/L    CO2 26 22 - 29 mmol/L    Anion Gap 12 7 - 16 mmol/L    Glucose 131 (H) 74 - 99 mg/dL    BUN 22 6 - 23 mg/dL    CREATININE 0.7 0.5 - 1.0 mg/dL    GFR Non-African American >60 >=60 mL/min/1.73    GFR African American >60     Calcium 9.3 8.6 - 10.2 mg/dL   Troponin   Result Value Ref Range    Troponin, High Sensitivity 19 (H) 0 - 9 ng/L   Brain Natriuretic Peptide   Result Value Ref Range    Pro- (H) 0 - 125 pg/mL   D-Dimer, Quantitative   Result Value Ref Range    D-Dimer, Quant <200 ng/mL DDU   Troponin   Result Value Ref Range    Troponin, High Sensitivity 17 (H) 0 - 9 ng/L   EKG 12 Lead   Result Value Ref Range    Ventricular Rate 69 BPM    Atrial Rate 69 BPM    P-R Interval 154 ms    QRS Duration 84 ms    Q-T Interval 432 ms    QTc Calculation (Bazett) 462 ms    P Axis 64 degrees    R Axis 21 degrees    T Axis 72 degrees       Radiology:  XR CHEST (2 VW)   Final Result   No acute process. ------------------------- NURSING NOTES AND VITALS REVIEWED ---------------------------  Date / Time Roomed:  11/15/2021 10:43 PM  ED Bed Assignment:  13/13    The nursing notes within the ED encounter and vital signs as below have been reviewed. BP (!) 146/80   Pulse 72   Temp 98.3 °F (36.8 °C) (Temporal)   Resp 18   SpO2 96%   Oxygen Saturation Interpretation: Normal      ------------------------------------------ PROGRESS NOTES ------------------------------------------  12:08 AM EST  I have spoken with the patient and discussed todays results, in addition to providing specific details for the plan of care and counseling regarding the diagnosis and prognosis. Their questions are answered at this time and they are agreeable with the plan. I discussed at length with them reasons for immediate return here for re evaluation. They will followup with their primary care physician by calling their office tomorrow. --------------------------------- ADDITIONAL PROVIDER NOTES ---------------------------------  At this time the patient is without objective evidence of an acute process requiring hospitalization or inpatient management. They have remained hemodynamically stable throughout their entire ED visit and are stable for discharge with outpatient follow-up.      The plan has been discussed in detail and they are aware of the specific conditions for emergent return, as well as the importance of follow-up. New Prescriptions    FUROSEMIDE (LASIX) 20 MG TABLET    Take 1 tablet by mouth daily for 7 days       Diagnosis:  1. Acute congestive heart failure, unspecified heart failure type ([address]    2. Dyspnea on exertion        Disposition:  Patient's disposition: Discharge to home  Patient's condition is stable.        Raymond Paz MD  Resident  11/16/21 4222

## 2021-11-18 DIAGNOSIS — N30.00 ACUTE CYSTITIS WITHOUT HEMATURIA: Primary | ICD-10-CM

## 2021-11-18 LAB — URINE CULTURE, ROUTINE: NORMAL

## 2021-11-18 RX ORDER — SULFAMETHOXAZOLE AND TRIMETHOPRIM 800; 160 MG/1; MG/1
1 TABLET ORAL 2 TIMES DAILY
Qty: 6 TABLET | Refills: 0 | Status: SHIPPED
Start: 2021-11-18 | End: 2022-06-06 | Stop reason: SDUPTHER

## 2021-11-30 ENCOUNTER — OFFICE VISIT (OUTPATIENT)
Dept: FAMILY MEDICINE CLINIC | Age: 66
End: 2021-11-30
Payer: MEDICARE

## 2021-11-30 VITALS
HEIGHT: 72 IN | HEART RATE: 97 BPM | WEIGHT: 293 LBS | BODY MASS INDEX: 39.68 KG/M2 | SYSTOLIC BLOOD PRESSURE: 130 MMHG | TEMPERATURE: 96.7 F | OXYGEN SATURATION: 96 % | RESPIRATION RATE: 18 BRPM | DIASTOLIC BLOOD PRESSURE: 82 MMHG

## 2021-11-30 DIAGNOSIS — F41.9 ANXIETY AND DEPRESSION: Chronic | ICD-10-CM

## 2021-11-30 DIAGNOSIS — E06.3 HYPOTHYROIDISM DUE TO HASHIMOTO'S THYROIDITIS: ICD-10-CM

## 2021-11-30 DIAGNOSIS — F32.A ANXIETY AND DEPRESSION: Chronic | ICD-10-CM

## 2021-11-30 DIAGNOSIS — G47.33 OBSTRUCTIVE SLEEP APNEA SYNDROME: Primary | ICD-10-CM

## 2021-11-30 DIAGNOSIS — L71.9 ACNE ROSACEA: ICD-10-CM

## 2021-11-30 DIAGNOSIS — K21.9 GASTROESOPHAGEAL REFLUX DISEASE WITHOUT ESOPHAGITIS: Chronic | ICD-10-CM

## 2021-11-30 DIAGNOSIS — R06.09 DOE (DYSPNEA ON EXERTION): ICD-10-CM

## 2021-11-30 DIAGNOSIS — I50.9 CONGESTIVE HEART FAILURE, UNSPECIFIED HF CHRONICITY, UNSPECIFIED HEART FAILURE TYPE (HCC): ICD-10-CM

## 2021-11-30 DIAGNOSIS — E03.8 HYPOTHYROIDISM DUE TO HASHIMOTO'S THYROIDITIS: ICD-10-CM

## 2021-11-30 PROCEDURE — 99214 OFFICE O/P EST MOD 30 MIN: CPT | Performed by: NURSE PRACTITIONER

## 2021-11-30 RX ORDER — SERTRALINE HYDROCHLORIDE 100 MG/1
100 TABLET, FILM COATED ORAL NIGHTLY
Qty: 90 TABLET | Refills: 1 | Status: SHIPPED
Start: 2021-11-30 | End: 2022-07-08

## 2021-11-30 RX ORDER — OMEPRAZOLE 40 MG/1
40 CAPSULE, DELAYED RELEASE ORAL DAILY PRN
Qty: 90 CAPSULE | Refills: 1 | Status: SHIPPED
Start: 2021-11-30 | End: 2022-07-08

## 2021-11-30 RX ORDER — LEVOTHYROXINE SODIUM 137 UG/1
137 TABLET ORAL DAILY
Qty: 90 TABLET | Refills: 1 | Status: SHIPPED
Start: 2021-11-30 | End: 2022-07-08

## 2021-11-30 ASSESSMENT — ENCOUNTER SYMPTOMS
SINUS PAIN: 0
VOICE CHANGE: 0
FACIAL SWELLING: 0
SINUS PRESSURE: 0
ABDOMINAL PAIN: 0
COLOR CHANGE: 0
NAUSEA: 0
COUGH: 0
CONSTIPATION: 0
TROUBLE SWALLOWING: 0
VOMITING: 0
RHINORRHEA: 0
SHORTNESS OF BREATH: 1
BACK PAIN: 0
SORE THROAT: 0
CHEST TIGHTNESS: 0
WHEEZING: 0
DIARRHEA: 0

## 2021-11-30 NOTE — ASSESSMENT & PLAN NOTE
New Unclear control, lifestyle modifications recommended and sleep study ordered await test completion   Covid test 3 days prior to test

## 2021-11-30 NOTE — ASSESSMENT & PLAN NOTE
Well-controlled, continue current medications, continue current treatment plan, medication adherence emphasized, lifestyle modifications recommended and if possible start to wean off the medication and take every other day.

## 2021-11-30 NOTE — PATIENT INSTRUCTIONS
The medication list included in this document is our record of what you are currently taking, including any changes that were made at today's visit.  If you find any differences when compared to your medications at home, or have any questions that were not answered at your visit, please contact the office. Patient Education        Heart Failure and Sleep Apnea: Care Instructions  Overview     Sleep apnea is fairly common in people with heart failure. Sleep apnea means you stop breathing for 10 seconds or longer during sleep. When you stop breathing, the amount of oxygen in your blood drops, so your heart has to work harder to get enough oxygen to your body's tissues. This stress on your heart can lead to serious problems, like high blood pressure or heart disease. It may also cause you to snore loudly and not sleep well, so you wake up feeling tired. Getting treatment for sleep apnea can help you sleep and feel better. It may also help keep your heart failure from getting worse. Follow-up care is a key part of your treatment and safety. Be sure to make and go to all appointments, and call your doctor if you are having problems. It's also a good idea to know your test results and keep a list of the medicines you take. How can you care for yourself at home? · Lose weight, if needed. · Sleep on your side. It may help mild apnea. · Avoid alcohol and medicines such as sleeping pills, opioids, or sedatives before bed. · Don't smoke. If you need help quitting, talk to your doctor. · Prop up the head of your bed. · Treat breathing problems, such as a stuffy nose, that are caused by a cold or allergies. · Try a continuous positive airway pressure (CPAP) breathing machine if your doctor recommends it. · If CPAP doesn't work for you, ask your doctor if you can try other masks, settings, or breathing machines. · Try oral breathing devices or other nasal devices.   · Talk to your doctor if your nose feels dry or bleeds, or if it gets runny or stuffy when you use a breathing machine. · Tell your doctor if you're sleepy during the day and it affects your daily life. Don't drive or operate machinery when you're drowsy. When should you call for help? Watch closely for changes in your health, and be sure to contact your doctor if you have any problems. Where can you learn more? Go to https://[employer] [employer].Zilift. org and sign in to your [employer] account. Enter A820 in the SPOTBY.COM box to learn more about \"Heart Failure and Sleep Apnea: Care Instructions. \"     If you do not have an account, please click on the \"Sign Up Now\" link. Current as of: April 29, 2021               Content Version: 13.0  © 8722-5351 Blockchain. Care instructions adapted under license by [facility] ([facility]). If you have questions about a medical condition or this instruction, always ask your healthcare professional. [place] [address] any warranty or liability for your use of this information. Patient Education        Learning About CPAP for Sleep Apnea  What is CPAP? CPAP is a small machine that you use at home every night while you sleep. It increases air pressure in your throat to keep your airway open. When you have sleep apnea, this can help you sleep better, feel better, and avoid future health problems. CPAP stands for \"continuous positive airway pressure. \"  The CPAP machine will have one of the following:  · A mask that covers your nose and mouth  · A mask that covers your nose only  · A nasal pillow that covers only the openings of your nose  Why is it done? CPAP is usually the best treatment for obstructive sleep apnea. It is the first treatment choice and the most widely used. CPAP:  · Helps you have more normal sleep, so you feel less sleepy and more alert during the daytime. · May help keep heart failure or other heart problems from getting worse.   · May help lower your blood pressure. If you have a bed partner, they may also sleep better when you use a CPAP. That's because you aren't snoring or restless. Your doctor may suggest CPAP if you have:  · Moderate to severe sleep apnea. · Sleep apnea and coronary artery disease (CAD). · Sleep apnea and heart failure. What are the side effects? Some people who use CPAP have:  · A dry or stuffy nose and a sore throat. · Irritated skin on the face. · Bloating. How can you care for yourself? If using CPAP is not comfortable, or if you have certain side effects, work with your doctor to fix them. Here are some things you can try:  · Be sure the mask, nasal mask, or nasal pillow fits well. · See if your doctor can adjust the pressure of your CPAP. · If your nose or mouth is dry, set the machine to deliver warmer or wetter air. Or try using a humidifier. · If your nose is runny or stuffy, talk to your doctor about using a decongestant medicine or steroid nasal spray. Be safe with medicines. Read and follow all instructions on the label. Do not use the medicine longer than the label says. · Your doctor may also help you with problems like swallowing air, bloating, or claustrophobia. Talk to your doctor if you're still having problems. If these things don't help, you might try a different type of machine. Where can you learn more? Go to https://eWave Interactive[facility].Client24. org and sign in to your Secure64 account. Enter S335 in the A.P.Pharma box to learn more about \"Learning About CPAP for Sleep Apnea. \"     If you do not have an account, please click on the \"Sign Up Now\" link. Current as of: July 6, 2021               Content Version: 13.0  © 2006-2021 Healthwise, Incorporated. Care instructions adapted under license by [facility] ([facility]).  If you have questions about a medical condition or this instruction, always ask your healthcare professional. [address] any warranty or liability for your use of this information. Patient Education        Learning About Sleep Apnea  What is it? Sleep apnea means that breathing stops for short periods during sleep. When you stop breathing or have reduced airflow into your lungs during sleep, you don't sleep well and you can be very tired during the day. The oxygen levels in your blood may go down, and carbon dioxide levels go up. It may lead to other problems, such as high blood pressure and heart disease. Sleep apnea can range from mild to severe, based on how often breathing stops during sleep. For adults, breathing may stop as few as 5 times an hour (mild apnea) to 30 or more times an hour (severe apnea). Obstructive sleep apnea is the most common type. This most often occurs because your airways are blocked or partly blocked. Central sleep apnea is less common. It happens when the brain has trouble controlling breathing. Some people have both types. That's called complex sleep apnea. What are the symptoms? There are symptoms of sleep apnea that you may notice and symptoms that others may notice when you're asleep. Symptoms you may notice include:  · Feeling extremely sleepy during the day. · Feeling unrefreshed or tired after a night's sleep. · Problems with memory and concentration, or mood changes. · Morning headaches. · Getting up often during the night to urinate. · A dry mouth or sore throat in the morning. If you have a bed partner, they may notice that you:  · Have episodes of not breathing. · Snore loudly. Almost all people who have sleep apnea snore. But not all people who snore have sleep apnea. · Toss and turn during sleep. · Have nighttime choking or gasping spells. How is it diagnosed? Your doctor will probably do a physical exam and ask about your past health. The doctor may also ask you or your bed partner about your snoring and sleep behavior and how tired you feel during the day. Your doctor may suggest a sleep study.  Sleep studies are a series of tests that look at what happens to the body during sleep. They check for how often you stop breathing or have too little air flowing into your lungs during sleep. They also find out how much oxygen you have in your blood during sleep. A sleep study may take place in your home. Or it might take place at a sleep center, where you will spend the night. If your sleep apnea doesn't improve with treatment, you may have more tests to find out what's causing it. How is it treated? You may be able to help treat sleep apnea by making some lifestyle changes. You could try to lose weight, sleep on your side, and avoid alcohol and medicines like sedatives before bed. Sleep apnea is often treated with machines that deliver air through a mask to help keep your airways open. These include:  · Continuous positive airway pressure (CPAP). This increases air pressure in your throat. It keeps your airway open when you breathe in. It's the most common device. · Bilevel positive airway pressure (BPAP). You may also hear this called BiPAP. This uses different air pressures when you breathe in and out. · Adaptive servo ventilation (ASV). It senses pauses in breathing and adjusts air pressure. It's mostly used for central sleep apnea. You can also try oral breathing devices or nasal devices. If your tonsils or other tissues are blocking your airway, your doctor may suggest surgery to open the airway. How can you care for yourself at home? · Lose weight, if needed. · Sleep on your side. It may help mild apnea. · Avoid alcohol and medicines such as sleeping pills, opioids, or sedatives before bed. · Don't smoke. If you need help quitting, talk to your doctor. · Prop up the head of your bed. · Treat breathing problems, such as a stuffy nose, that are caused by a cold or allergies. · Try a continuous positive airway pressure (CPAP) breathing machine if your doctor recommends it.   · If CPAP doesn't work for you, ask your doctor if you can try other masks, settings, or breathing machines. · Try oral breathing devices or other nasal devices. · Talk to your doctor if your nose feels dry or bleeds, or if it gets runny or stuffy when you use a breathing machine. · Tell your doctor if you're sleepy during the day and it affects your daily life. Don't drive or operate machinery when you're drowsy. Where can you learn more? Go to https://[employer].Teikhos Tech. org and sign in to your [employer] account. Enter S121 in the ObjectLabs box to learn more about \"Learning About Sleep Apnea. \"     If you do not have an account, please click on the \"Sign Up Now\" link. Current as of: July 6, 2021               Content Version: 13.0  © 9764-2608 Healthwise, Incorporated. Care instructions adapted under license by [facility] ([facility]). If you have questions about a medical condition or this instruction, always ask your healthcare professional. [place] [address] any warranty or liability for your use of this information.

## 2021-11-30 NOTE — ASSESSMENT & PLAN NOTE
Borderline controlled, continue current medications, medication adherence emphasized, lifestyle modifications recommended and limit sodium, watch diet and read food labels well. Daily weight after using the restroom.  Keep appt with cardiology and sleep study

## 2021-11-30 NOTE — PROGRESS NOTES
OFFICE PROGRESS NOTE  [address] [place] [address] Rd  [id number]  Dept: [phone]   Chief Complaint   Patient presents with   [facility] ED Follow-up   [facility] Maintenance     Due for Covid Booster, Shingles Vaccine, TDAP       ASSESSMENT/PLAN   1. Obstructive sleep apnea syndrome  Assessment & Plan:  New Unclear control, lifestyle modifications recommended and sleep study ordered await test completion   Covid test 3 days prior to test  Orders:  -     Baseline Diagnostic Sleep Study; Future  -     COVID-19 Ambulatory; Future  -     Ambulatory referral to Sleep Medicine  2. Gastroesophageal reflux disease without esophagitis  Assessment & Plan:   Well-controlled, continue current medications, continue current treatment plan, medication adherence emphasized, lifestyle modifications recommended and if possible start to wean off the medication and take every other day. Orders:  -     omeprazole (PRILOSEC) 40 MG delayed release capsule; Take 1 capsule by mouth daily as needed (heartburn), Disp-90 capsule, R-1Note increase in doseNormal  3. Hypothyroidism due to Hashimoto's thyroiditis  Assessment & Plan:   Well-controlled, continue current medications, continue current treatment plan, medication adherence emphasized and lifestyle modifications recommended   Discussed taking same time every day on empty stomach, no vitamins, minerals, calcium or iron for 4 hours of  thyroid medication. Wait 30 - 60 minutes to have breakfast or other medications. Orders:  -     levothyroxine (SYNTHROID) 137 MCG tablet; Take 1 tablet by mouth Daily, Disp-90 tablet, R-1Normal  4.  Anxiety and depression  Assessment & Plan:   Well-controlled, continue current medications, continue current treatment plan, medication adherence emphasized, lifestyle modifications recommended and she is asymptomatic discussed weaning off medication and she declines  Orders:  -     sertraline (ZOLOFT) 100 MG tablet; Take 1 tablet by mouth nightly, Disp-90 tablet, R-1Normal  5. Acne rosacea  Assessment & Plan:   Uncontrolled, continue current medications, continue current treatment plan, medication adherence emphasized, lifestyle modifications recommended and declines referral to dermatology today  Orders:  -     metroNIDAZOLE (METROCREAM) 0.75 % cream; Apply 1 Dose topically 2 times daily, Topical, 2 TIMES DAILY Starting Tue 11/30/2021, Disp-60 g, R-3, Normal  6. VALDES (dyspnea on exertion)  Assessment & Plan:   Uncontrolled, continue current medications, medication adherence emphasized, lifestyle modifications recommended and keep appointment with cardiology, sleep study ordered, monitor sodium intake and daily weights get a scale  7. Congestive heart failure, unspecified HF chronicity, unspecified heart failure type [facility])  Assessment & Plan:   Borderline controlled, continue current medications, medication adherence emphasized, lifestyle modifications recommended and limit sodium, watch diet and read food labels well. Daily weight after using the restroom. Keep appt with cardiology and sleep study       Reviewed labs: BMP, CBCD, troponin x 2, D-dimer, BNP  Reviewed notes from ER 11/15/21  Reviewed radiology CXR 11/15/21    Discussed weight loss, Discussed exercising 30 minutes daily and Discussed taking medications as directed and adverse effects    Return for keep appt as scheduled. HPI:     Sleep Apnea: She presents for a sleep evaluation. She complains of snoring, periods of not breathing, excessive daytime sleepiness, falling asleep while reading, watching television, awakening in the middle of the night because of SOB, urination, difficulty falling asleep once awakened, feels sleepy during the day, take naps during the day. Symptoms began several years ago, unchanged since that time. She goes to sleep at midnight weekdays and midnight weekends. She awakens 9 weekdays and 9 weekends.  She falls asleep in 45-60 minutes. Collar size 19\". She denies snorting, choking, tossing and turning, kicking, decreased sexual drive, impotence, knees buckling with laughing, completely or partially paralyzed while falling asleep or waking up, sinus problems, congested nose. Previous evaluation and treatment has included none. GERD: Patient complains of heartburn. This has been associated with fullness after meals and heartburn. She denies no other symptoms. Symptoms have been present for several years. She denies dysphagia. She has not lost weight. She denies melena, hematochezia, hematemesis, and coffee ground emesis. Medical therapy in the past has included proton pump inhibitors.     Hypothyroidism: Patient complains of hypothyroidism. Symptoms include change in energy level. Patient denies diarrhea, heat / cold intolerance, nervousness, palpitations and weight changes. Onset of symptoms was several years ago. Symptoms have gradually worsened. She has anxiety and depression which is well controlled on Sertraline 100 mg daily. She denies any suicidal or homicidal ideations. No adverse effects. She is asymptomatic but declines to come off the medication. I sent her to the ER 11/15/21 for the VALDES concern was for possible PE as she had traveled to [place]. She had labs which were normal and CXR normal, was started on Lasix 20 mg daily x 1 week but really didn't notice a difference with the medication. She is down 6 pounds over the last 2 weeks. When she is sitting she is okay but when she gets up and walks it becomes worse some days not so bad and other bad. She will walk so far and then have to stop and rest. The day I saw her the PO was 85% with walking 20 feet in the office but she recovered quickly.        Current Outpatient Medications:     levothyroxine (SYNTHROID) 137 MCG tablet, Take 1 tablet by mouth Daily, Disp: 90 tablet, Rfl: 1    omeprazole (PRILOSEC) 40 MG delayed release capsule, Take 1 capsule by mouth daily as needed (heartburn), Disp: 90 capsule, Rfl: 1    sertraline (ZOLOFT) 100 MG tablet, Take 1 tablet by mouth nightly, Disp: 90 tablet, Rfl: 1    metroNIDAZOLE (METROCREAM) 0.75 % cream, Apply 1 Dose topically 2 times daily, Disp: 60 g, Rfl: 3    Handicap Placard MISC, by Does not apply route Start 7/13/2021 and expires 7/12/2026, Disp: 1 each, Rfl: 0    folic acid (FOLVITE) 1 MG tablet, take 1 tablet by mouth once daily, Disp: 30 tablet, Rfl: 6    prednisoLONE acetate (PRED FORTE) 1 % ophthalmic suspension, instill 1 drop INTO AFFECTED EYE(S) twice a day, Disp: , Rfl:     Upadacitinib ER 15 MG TB24, Take 15 mg by mouth daily, Disp: , Rfl:     Cholecalciferol 50 MCG (2000 UT) TABS, Take 1 tablet by mouth daily, Disp: 30 tablet, Rfl: 6    methotrexate (RHEUMATREX) 2.5 MG chemo tablet, , Disp: , Rfl:     Multiple Vitamins-Minerals (MULTI-VITAMIN GUMMIES PO), Take 2 tablets by mouth daily, Disp: , Rfl:     furosemide (LASIX) 20 MG tablet, Take 1 tablet by mouth daily for 7 days, Disp: 7 tablet, Rfl: 0      Surgical History:  has a past surgical history that includes Appendectomy; Tonsillectomy; Cholecystectomy, laparoscopic (02/23/2017); other surgical history (04/11/2017); Colonoscopy (09/11/2017); other surgical history (09/12/2017); Nerve Block (Left, 05/05/2020); Nerve Block (Left, 05/05/2020); Anesthesia Nerve Block (Left, 5/5/2020); Nerve Block (Left, 06/04/2020); Pain management procedure (Left, 6/4/2020); Nerve Block (Left, 07/14/2020); and Pain management procedure (Left, 7/14/2020). Social History:  reports that she quit smoking about 34 years ago. She smoked 0.00 packs per day for 0.00 years. She has never used smokeless tobacco. She reports that she does not drink alcohol and does not use drugs. Family History: family history includes Breast Cancer in her mother; Diabetes in her mother; Heart Disease in her father; Other in her father.   I have reviewed Rosalie's allergies, medications, problem list, medical, social and family history and have updated as needed in the electronic medical record    Review of Systems   Constitutional: Positive for activity change. Negative for appetite change, chills, diaphoresis, fatigue, fever and unexpected weight change. HENT: Negative for congestion, dental problem, drooling, ear discharge, ear pain, facial swelling, hearing loss, mouth sores, nosebleeds, postnasal drip, rhinorrhea, sinus pressure, sinus pain, sneezing, sore throat, tinnitus, trouble swallowing and voice change. Eyes: Negative for visual disturbance. Respiratory: Positive for shortness of breath. Negative for cough, chest tightness and wheezing. Cardiovascular: Negative for chest pain, palpitations and leg swelling. Gastrointestinal: Negative for abdominal pain, constipation, diarrhea, nausea and vomiting. Endocrine: Negative for cold intolerance, heat intolerance, polydipsia, polyphagia and polyuria. Genitourinary: Negative for difficulty urinating, frequency and urgency. Nocturia several times at night   Musculoskeletal: Negative for arthralgias, back pain, gait problem, joint swelling, myalgias, neck pain and neck stiffness. Skin: Negative for color change, pallor, rash and wound. Allergic/Immunologic: Negative for environmental allergies, food allergies and immunocompromised state. Neurological: Negative for dizziness, tremors, seizures, syncope, facial asymmetry, speech difficulty, weakness, light-headedness, numbness and headaches. Hematological: Negative for adenopathy. Does not bruise/bleed easily. Psychiatric/Behavioral: Positive for sleep disturbance. Negative for agitation, behavioral problems, confusion, decreased concentration, dysphoric mood, hallucinations, self-injury and suicidal ideas. The patient is not nervous/anxious and is not hyperactive.         OBJECTIVE:     VS:  Wt Readings from Last 3 Encounters:   11/30/21 (!) 358 lb (162.4 kg) 11/15/21 (!) 364 lb (165.1 kg)   09/01/21 (!) 357 lb (161.9 kg)                       Vitals:    11/30/21 1515   BP: 130/82   Pulse: 97   Resp: 18   Temp: 96.7 °F (35.9 °C)   TempSrc: Temporal   SpO2: 96%   Weight: (!) 358 lb (162.4 kg)   Height: 6' (1.829 m)       General: Alert and oriented to person, place, and time, well developed and well nourished, morbidly obese, in no acute distress  SKIN: Warm and dry, intact with erythema across cheeks with small macular rash. HEAD: normocephalic, atraumatic  Eyes: sclera/conjunctiva clear, PERRLA, EOMI's intact  ENT: tympanic membranes, external ear and ear canal normal bilaterally, normal hearing, Nose without deformity, nasal mucosa and turbinates normal without polyps   Throat: clear, tongue midline, Mallampati score 4+,  tonsils 1+, drainage, no masses or lesions noted, good dentition  Neck: supple and non-tender without mass, trachea midline, no cervical lymphadenopathy, no bruit, no thyromegaly or nodules  Cardiovascular: regular rate and regular rhythm, normal S1 and S2,  no murmurs, rubs, clicks, or gallop. Distal pulses intact, no carotid bruits. No edema  Pulmonary/Chest: clear to auscultation bilaterally diminished in the bases, no wheezes, rales or rhonchi, diminished in bases could be secondary to body habitus air movement, no respiratory distress  Abdomen: soft, non-tender, non-distended, normal bowel sounds, no masses or hepatosplenomegaly  Neurologic:  gait, coordination and speech normal  Extremities: no clubbing, cyanosis, or edema. Psychiatric: Good eye contact, normal mood and affect, answers questions appropriately    I have reviewed my findings and recommendations with Ruby Newman.     Xavier Vázquez, AMBREEN - CNP, NP-C, FNP-BC

## 2021-12-10 ENCOUNTER — TELEPHONE (OUTPATIENT)
Dept: SLEEP CENTER | Age: 66
End: 2021-12-10

## 2021-12-20 ENCOUNTER — IMMUNIZATION (OUTPATIENT)
Dept: PRIMARY CARE CLINIC | Age: 66
End: 2021-12-20

## 2021-12-20 PROCEDURE — 0064A COVID-19, MODERNA BOOSTER VACCINE 0.25ML DOSE: CPT | Performed by: NURSE PRACTITIONER

## 2021-12-20 PROCEDURE — 91306 COVID-19, MODERNA BOOSTER VACCINE 0.25ML DOSE: CPT | Performed by: NURSE PRACTITIONER

## 2022-01-04 NOTE — PROGRESS NOTES
[facility] Cardiology consult  Dr. Kelsey Coreas      Reason for Consult: Dyspnea  Referring Physician: Samanta Egan, APRN - CNP     CHIEF COMPLAINT:   Chief Complaint   Patient presents with    Shortness of Breath       HISTORY OF PRESENT ILLNESS:   74year old female with history of CHF and thyroid disease is here due to dyspnea. Shortness of breath with exertion, no pedal edema, no PND, no orthopnea, better with Lasix, denies any chest pain, no palpitations, no lightheadedness or dizziness, no syncope, no presyncopal episodes.     Past Medical History:   Diagnosis Date    Anxiety and depression     Bacteremia 5/26/2019    Chronic back pain     Congestive heart failure ([facility] [address] 11/16/2021    Diverticulitis     GERD (gastroesophageal reflux disease)     Gram-negative bacteremia 5/26/2019    Hypothyroidism     Lumbar spondylosis 8/11/2021    Neuropathy     Obesity     Obstructive sleep apnea syndrome 11/30/2021    Rheumatoid arthritis involving multiple sites with positive rheumatoid factor ([facility] [address] 9/12/2017    Thyroid disease          Past Surgical History:   Procedure Laterality Date    ANESTHESIA NERVE BLOCK Left 5/5/2020    LEFT L5 TRANSFORAMINAL EPIDURAL STEROID INJECTION performed by Heather Armendariz DO at [address] [facility], [address]  02/23/2017    robotic assisted cholecystectomy with IOC    COLONOSCOPY  09/11/2017    NERVE BLOCK Left 05/05/2020    NERVE BLOCK Left 05/05/2020    NERVE BLOCK Left 06/04/2020    transforaminal    NERVE BLOCK Left 07/14/2020    L4-5 transforaminal    OTHER SURGICAL HISTORY  04/11/2017    Laparoscopic Hand Assisted Open Sigmoid Colectomy with Colostomy     OTHER SURGICAL HISTORY  09/12/2017    laparscopic robotic assited colostomy reversed    PAIN MANAGEMENT PROCEDURE Left 6/4/2020    LEFT L4-5 TRANSFORAMINAL EPIDURAL STEROID INJECTION performed by Heather Armendariz DO at [address] PROCEDURE Left 7/14/2020    LEFT L4-5 TRANSFORAMINAL EPIDURAL STEROID INJECTION performed by Octavio Boss DO at [facility]           Current Outpatient Medications   Medication Sig Dispense Refill    levothyroxine (SYNTHROID) 137 MCG tablet Take 1 tablet by mouth Daily 90 tablet 1    omeprazole (PRILOSEC) 40 MG delayed release capsule Take 1 capsule by mouth daily as needed (heartburn) 90 capsule 1    sertraline (ZOLOFT) 100 MG tablet Take 1 tablet by mouth nightly 90 tablet 1    metroNIDAZOLE (METROCREAM) 0.75 % cream Apply 1 Dose topically 2 times daily 60 g 3    Handicap Placard MISC by Does not apply route Start 7/13/2021 and expires 3/82/3724 1 each 0    folic acid (FOLVITE) 1 MG tablet take 1 tablet by mouth once daily 30 tablet 6    Upadacitinib ER 15 MG TB24 Take 15 mg by mouth daily      Cholecalciferol 50 MCG (2000 UT) TABS Take 1 tablet by mouth daily 30 tablet 6    methotrexate (RHEUMATREX) 2.5 MG chemo tablet       Multiple Vitamins-Minerals (MULTI-VITAMIN GUMMIES PO) Take 2 tablets by mouth daily      furosemide (LASIX) 20 MG tablet Take 1 tablet by mouth daily for 7 days 7 tablet 0    prednisoLONE acetate (PRED FORTE) 1 % ophthalmic suspension instill 1 drop INTO AFFECTED EYE(S) twice a day (Patient not taking: Reported on 1/5/2022)       No current facility-administered medications for this visit.          Allergies as of 01/05/2022 - Fully Reviewed 01/05/2022   Allergen Reaction Noted    Enbrel [etanercept]  09/18/2019    Humira [adalimumab]  09/18/2019    Penicillins Swelling and Rash 06/30/2015       Social History     Socioeconomic History    Marital status: Single     Spouse name: Not on file    Number of children: Not on file    Years of education: Not on file    Highest education level: Not on file   Occupational History    Not on file   Tobacco Use    Smoking status: Former Smoker     Packs/day: 0.00     Years: 0.00     Pack years: 0.00 Quit date: 1986     Years since quittin.0    Smokeless tobacco: Never Used    Tobacco comment: quit 20 yrs ago   Vaping Use    Vaping Use: Never used   Substance and Sexual Activity    Alcohol use: No    Drug use: No    Sexual activity: Not on file   Other Topics Concern    Not on file   Social History Narrative    Not on file     Social Determinants of Health     Financial Resource Strain: Low Risk     Difficulty of Paying Living Expenses: Not hard at all   Food Insecurity: No Food Insecurity    Worried About Running Out of Food in the Last Year: Never true    [place] of Food in the Last Year: Never true   Transportation Needs: No Transportation Needs    Lack of Transportation (Medical): No    Lack of Transportation (Non-Medical):  No   Physical Activity:     Days of Exercise per Week: Not on file    Minutes of Exercise per Session: Not on file   Stress:     Feeling of Stress : Not on file   Social Connections:     Frequency of Communication with Friends and Family: Not on file    Frequency of Social Gatherings with Friends and Family: Not on file    Attends Sabianist Services: Not on file    Active Member of [address] [place] [address] or Organizations: Not on file    Attends Club or Organization Meetings: Not on file    Marital Status: Not on file   Intimate Partner Violence:     Fear of Current or Ex-Partner: Not on file    Emotionally Abused: Not on file    Physically Abused: Not on file    Sexually Abused: Not on file   Housing Stability: Unknown    Unable to Pay for Housing in the Last Year: No    Number of Places Lived in the Last Year: Not on file    Unstable Housing in the Last Year: No       Family History   Problem Relation Age of Onset    Diabetes Mother     Breast Cancer Mother     Other Father     Heart Disease Father        REVIEW OF SYSTEMS:     CONSTITUTIONAL:  negative for  fevers, chills, sweats and fatigue  EYES:  negative for  double vision, blurred vision and blind spots  HEENT:  negative for  tinnitus, earaches, nasal congestion and epistaxis  RESPIRATORY:  negative for  dry cough, cough with sputum, wheezing and hemoptysis  CARDIOVASCULAR: as per HPI  GASTROINTESTINAL:  negative for nausea, vomiting, diarrhea, constipation, pruritus and jaundice  GENITOURINARY:  negative for frequency, dysuria, nocturia, urinary incontinence and hesitancy  HEMATOLOGIC/LYMPHATIC:  negative for easy bruising, bleeding, lymphadenopathy and petechiae  ALLERGIC/IMMUNOLOGIC:  negative for urticaria, hay fever and angioedema  ENDOCRINE:  negative for heat intolerance, cold intolerance, tremor, hair loss and diabetic symptoms including neither polyuria nor polydipsia nor blurred vision  MUSCULOSKELETAL:  negative for  myalgias, arthralgias, joint swelling, stiff joints and decreased range of motion  NEUROLOGICAL:  negative for memory problems, speech problems, visual disturbance, dysphagia, weakness and numbness      PHYSICAL EXAM:   CONSTITUTIONAL:  awake, alert, cooperative, no apparent distress, and appears stated age  EYES:  lids and lashes normal and pupils equal, round and reactive to light, anicteric sclerae  HEAD:  normocepalic, without obvious abnormality, atraumatic, pink, moist mucous membranes. NECK:  Supple, symmetrical, trachea midline, no adenopathy, thyroid symmetric, not enlarged and no tenderness, skin normal  HEMATOLOGIC/LYMPHATICS:  no cervical lymphadenopathy and no supraclavicular lymphadenopathy  LUNGS:  No increased work of breathing, good air exchange, clear to auscultation bilaterally, no crackles or wheezing  CARDIOVASCULAR:  Normal apical impulse, regular rate and rhythm, normal S1 and S2, no S3 or S4, and no murmur noted and no JVD, no carotid bruit, no pedal edema, good carotid upstroke bilaterally.   ABDOMEN:  Soft, nontender, no masses, no hepatomegaly or splenomegaly, BS+  CHEST: nontender to palpation, expands symmetrically  MUSCULOSKELETAL:  No clubbing no cyanosis. there is no redness, warmth, or swelling of the joints  full range of motion noted  NEUROLOGIC:  Alert, awake,oriented x3. SKIN:  no bruising or bleeding, normal skin color, texture, turgor and no redness, warmth, or swelling      /80   Pulse 75   Resp 16   Ht 6' (1.829 m)   Wt (!) 350 lb (158.8 kg)   BMI 47.47 kg/m²     DATA:   I personally reviewed the visit EKG with the following interpretation: Sinus rhythm, LVH by voltage criteria, normal axis. EKG 11/15/21 Normal sinus rhythm  Cannot rule out Anterior infarct , age undetermined  Abnormal ECG  When compared with ECG of 26-MAY-2019 09:51,  Nonspecific T wave abnormality now evident in Lateral leads    ECHO: 11/12/19 Summary   Technically suboptimal and limited study. Left ventricular internal dimensions were normal in diastole and systole. Borderline concentric left ventricular hypertrophy. No regional wall motion abnormalities seen. Normal left ventricular ejection fraction. Physiologic and/or trace mitral regurgitation is present. The aortic valve appears mildly sclerotic. Physiologic and/or trace aortic regurgitation is noted.     Stress Test:   Angiography:   Cardiology Labs: BMP:    Lab Results   Component Value Date     11/15/2021    K 3.6 11/15/2021     11/15/2021    CO2 26 11/15/2021    BUN 22 11/15/2021    CREATININE 0.7 11/15/2021     CMP:    Lab Results   Component Value Date     11/15/2021    K 3.6 11/15/2021     11/15/2021    CO2 26 11/15/2021    BUN 22 11/15/2021    CREATININE 0.7 11/15/2021    PROT 7.4 06/25/2020     CBC:    Lab Results   Component Value Date    WBC 6.9 11/15/2021    RBC 4.55 11/15/2021    HGB 13.3 11/15/2021    HCT 40.1 11/15/2021    MCV 88.1 11/15/2021    RDW 13.6 11/15/2021     11/15/2021     PT/INR:  No results found for: PTINR  PT/INR Warfarin:  No components found for: PTPATWAR, PTINRWAR  PTT:    Lab Results   Component Value Date    APTT 26.4 04/17/2017     PTT Heparin:  No components found for: APTTHEP  Magnesium:    Lab Results   Component Value Date    MG 2.0 05/27/2019     TSH:    Lab Results   Component Value Date    TSH 1.440 04/15/2021     TROPONIN:  No components found for: TROP  BNP:  No results found for: BNP  FASTING LIPID PANEL:    Lab Results   Component Value Date    CHOL 232 04/15/2021    HDL 70 04/15/2021    TRIG 119 04/15/2021     No orders to display     I have personally reviewed the laboratory, cardiac diagnostic and radiographic testing as outlined above:      IMPRESSION:  1. Dyspnea on exertion: Suspect multifactorial secondary to obesity, deconditioning, and mild CHF as evident by improvement of her symptoms with Lasix  2. Diastolic congestive heart failure: Chronic, compensated, continue Lasix  3. Left ventricular hypertrophy on echocardiogram  4. Rheumatoid arthritis    RECOMMENDATIONS:   1. Continue Lasix  2. Continue the rest of treatment  3. Follow-up with PUNEET Wen as scheduled  4. Follow-up with Dr. Tree Grover in 6 months, sooner if symptomatic for any reason    I have reviewed my findings and recommendations with patient    Thank you for the consult  Electronically signed by Louie Powell MD on 1/5/2022 at 2:19 PM      NOTE: This report was transcribed using voice recognition software.  Every effort was made to ensure accuracy; however, inadvertent computerized transcription errors may be present

## 2022-01-05 ENCOUNTER — OFFICE VISIT (OUTPATIENT)
Dept: CARDIOLOGY CLINIC | Age: 67
End: 2022-01-05
Payer: MEDICARE

## 2022-01-05 VITALS
DIASTOLIC BLOOD PRESSURE: 80 MMHG | HEIGHT: 72 IN | HEART RATE: 75 BPM | SYSTOLIC BLOOD PRESSURE: 134 MMHG | BODY MASS INDEX: 39.68 KG/M2 | RESPIRATION RATE: 16 BRPM | WEIGHT: 293 LBS

## 2022-01-05 DIAGNOSIS — R06.09 DOE (DYSPNEA ON EXERTION): Primary | ICD-10-CM

## 2022-01-05 PROCEDURE — 99213 OFFICE O/P EST LOW 20 MIN: CPT | Performed by: INTERNAL MEDICINE

## 2022-01-05 PROCEDURE — 93000 ELECTROCARDIOGRAM COMPLETE: CPT | Performed by: INTERNAL MEDICINE

## 2022-01-05 RX ORDER — FUROSEMIDE 20 MG/1
20 TABLET ORAL DAILY
Qty: 90 TABLET | Refills: 1 | Status: SHIPPED
Start: 2022-01-05 | End: 2022-06-28

## 2022-01-05 RX ORDER — POTASSIUM CHLORIDE 750 MG/1
10 TABLET, EXTENDED RELEASE ORAL DAILY
Qty: 90 TABLET | Refills: 2 | Status: SHIPPED
Start: 2022-01-05 | End: 2022-09-21

## 2022-03-25 LAB
ALBUMIN SERPL-MCNC: 4.9 G/DL
ALP BLD-CCNC: 105 U/L
ALT SERPL-CCNC: 50 U/L
ANION GAP SERPL CALCULATED.3IONS-SCNC: NORMAL MMOL/L
AST SERPL-CCNC: 40 U/L
BASOPHILS ABSOLUTE: 0.5 /ΜL
BASOPHILS RELATIVE PERCENT: 0.5 %
BILIRUB SERPL-MCNC: 1.2 MG/DL (ref 0.1–1.4)
BUN BLDV-MCNC: NORMAL MG/DL
CALCIUM SERPL-MCNC: 9.6 MG/DL
CHLORIDE BLD-SCNC: 104 MMOL/L
CO2: 30 MMOL/L
CREAT SERPL-MCNC: 0.7 MG/DL
EOSINOPHILS ABSOLUTE: 1.3 /ΜL
EOSINOPHILS RELATIVE PERCENT: 52 %
GFR CALCULATED: 89
GLUCOSE BLD-MCNC: 102 MG/DL
HCT VFR BLD CALC: 40.6 % (ref 36–46)
HEMOGLOBIN: 14 G/DL (ref 12–16)
LYMPHOCYTES ABSOLUTE: 17.3 /ΜL
LYMPHOCYTES RELATIVE PERCENT: 692 %
MCH RBC QN AUTO: 30.6 PG
MCHC RBC AUTO-ENTMCNC: 34.5 G/DL
MCV RBC AUTO: 88.6 FL
MONOCYTES ABSOLUTE: 8 /ΜL
MONOCYTES RELATIVE PERCENT: 320 %
NEUTROPHILS ABSOLUTE: 72.9 /ΜL
NEUTROPHILS RELATIVE PERCENT: 2916 %
PDW BLD-RTO: 14.7 %
PLATELET # BLD: 226 K/ΜL
PMV BLD AUTO: 10.3 FL
POTASSIUM SERPL-SCNC: 4.3 MMOL/L
RBC # BLD: 4.58 10^6/ΜL
SODIUM BLD-SCNC: 141 MMOL/L
TOTAL PROTEIN: 7.3
WBC # BLD: 4 10^3/ML

## 2022-05-02 ENCOUNTER — OFFICE VISIT (OUTPATIENT)
Dept: FAMILY MEDICINE CLINIC | Age: 67
End: 2022-05-02
Payer: MEDICARE

## 2022-05-02 ENCOUNTER — PATIENT MESSAGE (OUTPATIENT)
Dept: FAMILY MEDICINE CLINIC | Age: 67
End: 2022-05-02

## 2022-05-02 VITALS
DIASTOLIC BLOOD PRESSURE: 82 MMHG | RESPIRATION RATE: 18 BRPM | HEIGHT: 72 IN | BODY MASS INDEX: 39.68 KG/M2 | WEIGHT: 293 LBS | SYSTOLIC BLOOD PRESSURE: 136 MMHG | HEART RATE: 86 BPM | TEMPERATURE: 97.5 F | OXYGEN SATURATION: 98 %

## 2022-05-02 DIAGNOSIS — E03.8 HYPOTHYROIDISM DUE TO HASHIMOTO'S THYROIDITIS: ICD-10-CM

## 2022-05-02 DIAGNOSIS — Z12.31 SCREENING MAMMOGRAM FOR BREAST CANCER: ICD-10-CM

## 2022-05-02 DIAGNOSIS — R35.0 FREQUENT URINATION: Primary | ICD-10-CM

## 2022-05-02 DIAGNOSIS — Z13.6 SCREENING FOR CARDIOVASCULAR CONDITION: ICD-10-CM

## 2022-05-02 DIAGNOSIS — R73.03 PREDIABETES: ICD-10-CM

## 2022-05-02 DIAGNOSIS — E06.3 HYPOTHYROIDISM DUE TO HASHIMOTO'S THYROIDITIS: ICD-10-CM

## 2022-05-02 DIAGNOSIS — R60.0 EDEMA OF BOTH LOWER LEGS: Primary | ICD-10-CM

## 2022-05-02 PROCEDURE — 99214 OFFICE O/P EST MOD 30 MIN: CPT | Performed by: NURSE PRACTITIONER

## 2022-05-02 ASSESSMENT — ENCOUNTER SYMPTOMS
FACIAL SWELLING: 0
WHEEZING: 0
VOMITING: 0
RHINORRHEA: 0
SORE THROAT: 0
DIARRHEA: 0
SINUS PRESSURE: 0
COUGH: 0
ABDOMINAL PAIN: 0
CHEST TIGHTNESS: 0
CONSTIPATION: 0
SHORTNESS OF BREATH: 0
VOICE CHANGE: 0
NAUSEA: 0
SINUS PAIN: 0
BACK PAIN: 1
TROUBLE SWALLOWING: 0
COLOR CHANGE: 0

## 2022-05-02 ASSESSMENT — PATIENT HEALTH QUESTIONNAIRE - PHQ9
5. POOR APPETITE OR OVEREATING: 0
8. MOVING OR SPEAKING SO SLOWLY THAT OTHER PEOPLE COULD HAVE NOTICED. OR THE OPPOSITE, BEING SO FIGETY OR RESTLESS THAT YOU HAVE BEEN MOVING AROUND A LOT MORE THAN USUAL: 0
SUM OF ALL RESPONSES TO PHQ QUESTIONS 1-9: 0
7. TROUBLE CONCENTRATING ON THINGS, SUCH AS READING THE NEWSPAPER OR WATCHING TELEVISION: 0
SUM OF ALL RESPONSES TO PHQ QUESTIONS 1-9: 0
4. FEELING TIRED OR HAVING LITTLE ENERGY: 0
SUM OF ALL RESPONSES TO PHQ QUESTIONS 1-9: 0
6. FEELING BAD ABOUT YOURSELF - OR THAT YOU ARE A FAILURE OR HAVE LET YOURSELF OR YOUR FAMILY DOWN: 0
2. FEELING DOWN, DEPRESSED OR HOPELESS: 0
SUM OF ALL RESPONSES TO PHQ QUESTIONS 1-9: 0
9. THOUGHTS THAT YOU WOULD BE BETTER OFF DEAD, OR OF HURTING YOURSELF: 0
3. TROUBLE FALLING OR STAYING ASLEEP: 0
10. IF YOU CHECKED OFF ANY PROBLEMS, HOW DIFFICULT HAVE THESE PROBLEMS MADE IT FOR YOU TO DO YOUR WORK, TAKE CARE OF THINGS AT HOME, OR GET ALONG WITH OTHER PEOPLE: 0

## 2022-05-02 NOTE — PROGRESS NOTES
OFFICE PROGRESS NOTE  [address] [place] [address] [address]  [id number]  Dept: [phone]   Chief Complaint   Patient presents with    Edema       ASSESSMENT/PLAN   1. Edema of both lower legs  Assessment & Plan:   Improving since she started the water pill 3 weeks ago. Discussed needs to take on a daily basis with her potassium this also helps with her CHF. 2. Hypothyroidism due to Hashimoto's thyroiditis  Assessment & Plan:   Asymptomatic at this time. Continue levothyroxine and have labs done. Discussed taking same time every day on empty stomach, no vitamins, minerals, calcium or iron for 4 hours of  thyroid medication. Wait 30 - 60 minutes to have breakfast or other medications. Orders:  -     TSH; Future  -     T4, Free; Future  3. Prediabetes  -     Hemoglobin A1C; Future  4. Screening for cardiovascular condition  -     Lipid Panel; Future  5. Screening mammogram for breast cancer  -     [facility] TORREY DIGITAL SCREEN BILATERAL; Future         Reviewed notes from DR Amor Orosco      Discussed weight loss, Discussed exercising 30 minutes daily and Discussed taking medications as directed and adverse effects    Return in about 3 months (around 8/2/2022) for medicare well exam.     HPI:   Here today for follow up on leg edema, she had CHF last year and saw Dr Amor Orsoco in January was RX lasix and potassium she was doing well so she didn't take then in April she got rid of her mattress and was sleeping in the chair and started having swelling in her feet and legs. She did get her mattress and is now back in bed. She did start the lasix and potassium every day in the last couple of weeks. She did see her Rheumatologist for her RA. She is complaining of blurry vision off and on has an upcoming appt. Hypothyroidism: Patient complains of hypothyroidism. Symptoms include weight changes.  Patient denies change in energy level, diarrhea, heat / cold intolerance, nervousness and palpitations. Onset of symptoms was several years ago. Symptoms have progressed to a point and plateaued. Current Outpatient Medications:     furosemide (LASIX) 20 MG tablet, Take 1 tablet by mouth daily for 7 days, Disp: 90 tablet, Rfl: 1    potassium chloride (KLOR-CON M) 10 MEQ extended release tablet, Take 1 tablet by mouth daily, Disp: 90 tablet, Rfl: 2    levothyroxine (SYNTHROID) 137 MCG tablet, Take 1 tablet by mouth Daily, Disp: 90 tablet, Rfl: 1    omeprazole (PRILOSEC) 40 MG delayed release capsule, Take 1 capsule by mouth daily as needed (heartburn), Disp: 90 capsule, Rfl: 1    sertraline (ZOLOFT) 100 MG tablet, Take 1 tablet by mouth nightly, Disp: 90 tablet, Rfl: 1    metroNIDAZOLE (METROCREAM) 0.75 % cream, Apply 1 Dose topically 2 times daily, Disp: 60 g, Rfl: 3    Handicap Placard MISC, by Does not apply route Start 7/13/2021 and expires 7/12/2026, Disp: 1 each, Rfl: 0    folic acid (FOLVITE) 1 MG tablet, take 1 tablet by mouth once daily, Disp: 30 tablet, Rfl: 6    Upadacitinib ER 15 MG TB24, Take 15 mg by mouth daily, Disp: , Rfl:     Cholecalciferol 50 MCG (2000 UT) TABS, Take 1 tablet by mouth daily, Disp: 30 tablet, Rfl: 6    methotrexate (RHEUMATREX) 2.5 MG chemo tablet, , Disp: , Rfl:     Multiple Vitamins-Minerals (MULTI-VITAMIN GUMMIES PO), Take 2 tablets by mouth daily, Disp: , Rfl:       Surgical History:  has a past surgical history that includes Appendectomy; Tonsillectomy; Cholecystectomy, laparoscopic (02/23/2017); other surgical history (04/11/2017); Colonoscopy (09/11/2017); other surgical history (09/12/2017); Nerve Block (Left, 05/05/2020); Nerve Block (Left, 05/05/2020); Anesthesia Nerve Block (Left, 5/5/2020); Nerve Block (Left, 06/04/2020); Pain management procedure (Left, 6/4/2020); Nerve Block (Left, 07/14/2020); and Pain management procedure (Left, 7/14/2020). Social History:  reports that she quit smoking about 35 years ago.  She smoked 0.00 packs per day for 0.00 years. She has never used smokeless tobacco. She reports that she does not drink alcohol and does not use drugs. Family History: family history includes Breast Cancer in her mother; Diabetes in her mother; Heart Disease in her father; Other in her father. I have reviewed Rosalie's allergies, medications, problem list, medical, social and family history and have updated as needed in the electronic medical record    Review of Systems   Constitutional: Negative for activity change, appetite change, chills, diaphoresis, fatigue, fever and unexpected weight change. HENT: Negative for congestion, dental problem, drooling, ear discharge, ear pain, facial swelling, hearing loss, mouth sores, nosebleeds, postnasal drip, rhinorrhea, sinus pressure, sinus pain, sneezing, sore throat, tinnitus, trouble swallowing and voice change. Eyes: Negative for visual disturbance. Respiratory: Negative for cough, chest tightness, shortness of breath and wheezing. Cardiovascular: Positive for leg swelling. Negative for chest pain and palpitations. Gastrointestinal: Negative for abdominal pain, constipation, diarrhea, nausea and vomiting. Endocrine: Negative for cold intolerance, heat intolerance, polydipsia, polyphagia and polyuria. Genitourinary: Negative for difficulty urinating, frequency and urgency. Musculoskeletal: Positive for back pain. Negative for arthralgias, gait problem, joint swelling, myalgias, neck pain and neck stiffness. Skin: Negative for color change, pallor, rash and wound. Allergic/Immunologic: Negative for environmental allergies, food allergies and immunocompromised state. Neurological: Negative for dizziness, tremors, seizures, syncope, facial asymmetry, speech difficulty, weakness, light-headedness, numbness and headaches. Hematological: Negative for adenopathy. Does not bruise/bleed easily.    Psychiatric/Behavioral: Negative for agitation, behavioral problems, confusion, decreased concentration, dysphoric mood, hallucinations, self-injury, sleep disturbance and suicidal ideas. The patient is not nervous/anxious and is not hyperactive. OBJECTIVE:     VS:  Wt Readings from Last 3 Encounters:   05/02/22 (!) 369 lb (167.4 kg)   01/05/22 (!) 350 lb (158.8 kg)   11/30/21 (!) 358 lb (162.4 kg)                       Vitals:    05/02/22 1258 05/02/22 1326   BP: 136/82    Pulse: 86    Resp: 18    Temp: 97.5 °F (36.4 °C)    SpO2: 93% 98%   Weight: (!) 369 lb (167.4 kg)    Height: 6' (1.829 m)        General: Alert and oriented to person, place, and time, well developed and well nourished, morbidly obese has gained 11 pounds since November,  in no acute distress  SKIN: Warm and dry, intact without any rash, masses or lesions  HEAD: normocephalic, atraumatic  Eyes: sclera/conjunctiva clear, PERRLA, EOMI's intact  ENT: tympanic membranes, external ear and ear canal normal bilaterally, normal hearing, Nose without deformity, nasal mucosa and turbinates normal without polyps   Throat: clear, tongue midline,Mallampati score 4+,  tonsils 1+  drainage, no masses or lesions noted, good dentition  Neck: supple and non-tender without mass, trachea midline, no cervical lymphadenopathy, no bruit, no thyromegaly or nodules  Cardiovascular: regular rate and regular rhythm, normal S1 and S2, no murmurs, rubs, clicks, or gallop. Distal pulses intact, no carotid bruits. No edema  Pulmonary/Chest: clear to auscultation bilaterally, no wheezes, rales or rhonchi, normal air movement, no respiratory distress  Abdomen: soft, non-tender, non-distended, normal bowel sounds, no masses or hepatosplenomegaly  Musculoskeletal: Decreased ROM in lower back, no joint swelling, + tenderness   Neurologic:  antalgic gait walks with cane, coordination and speech normal  Extremities: no clubbing, cyanosis, or edema.    Psychiatric: Good eye contact, normal mood and affect, answers questions appropriately    I have reviewed my findings and recommendations with Shena Lamar.     Nidhi Thompson, APRN - CNP, NP-C, FNP-BC

## 2022-05-02 NOTE — ASSESSMENT & PLAN NOTE
Asymptomatic at this time. Continue levothyroxine and have labs done. Discussed taking same time every day on empty stomach, no vitamins, minerals, calcium or iron for 4 hours of  thyroid medication. Wait 30 - 60 minutes to have breakfast or other medications.

## 2022-05-02 NOTE — ASSESSMENT & PLAN NOTE
Improving since she started the water pill 3 weeks ago. Discussed needs to take on a daily basis with her potassium this also helps with her CHF.

## 2022-05-02 NOTE — TELEPHONE ENCOUNTER
From: Kenna Dalton  To: Gómez Friend  Sent: 5/2/2022 2:21 PM EDT  Subject: UTI    Kandi. I was in today and forgot to tell you the main reason why I came. I might have a UTI Lots of pressure. Do I need a prescription ?  [facility]

## 2022-05-04 DIAGNOSIS — E55.9 VITAMIN D DEFICIENCY: Primary | Chronic | ICD-10-CM

## 2022-05-11 DIAGNOSIS — E03.8 HYPOTHYROIDISM DUE TO HASHIMOTO'S THYROIDITIS: ICD-10-CM

## 2022-05-11 DIAGNOSIS — E06.3 HYPOTHYROIDISM DUE TO HASHIMOTO'S THYROIDITIS: ICD-10-CM

## 2022-05-11 DIAGNOSIS — R06.09 DOE (DYSPNEA ON EXERTION): ICD-10-CM

## 2022-05-11 DIAGNOSIS — Z13.6 SCREENING FOR CARDIOVASCULAR CONDITION: ICD-10-CM

## 2022-05-11 DIAGNOSIS — R73.03 PREDIABETES: ICD-10-CM

## 2022-05-11 DIAGNOSIS — E55.9 VITAMIN D DEFICIENCY: Chronic | ICD-10-CM

## 2022-05-11 DIAGNOSIS — R35.0 FREQUENT URINATION: ICD-10-CM

## 2022-05-11 LAB
ALBUMIN SERPL-MCNC: 4.6 G/DL (ref 3.5–5.2)
ALP BLD-CCNC: 106 U/L (ref 35–104)
ALT SERPL-CCNC: 48 U/L (ref 0–32)
ANION GAP SERPL CALCULATED.3IONS-SCNC: 11 MMOL/L (ref 7–16)
AST SERPL-CCNC: 44 U/L (ref 0–31)
BACTERIA: NORMAL /HPF
BASOPHILS ABSOLUTE: 0.03 E9/L (ref 0–0.2)
BASOPHILS RELATIVE PERCENT: 0.9 % (ref 0–2)
BILIRUB SERPL-MCNC: 0.7 MG/DL (ref 0–1.2)
BILIRUBIN URINE: NEGATIVE
BLOOD, URINE: ABNORMAL
BUN BLDV-MCNC: 19 MG/DL (ref 6–23)
BURR CELLS: ABNORMAL
CALCIUM SERPL-MCNC: 9.4 MG/DL (ref 8.6–10.2)
CHLORIDE BLD-SCNC: 104 MMOL/L (ref 98–107)
CHOLESTEROL, TOTAL: 197 MG/DL (ref 0–199)
CLARITY: CLEAR
CO2: 27 MMOL/L (ref 22–29)
COLOR: YELLOW
CREAT SERPL-MCNC: 0.7 MG/DL (ref 0.5–1)
EOSINOPHILS ABSOLUTE: 0.05 E9/L (ref 0.05–0.5)
EOSINOPHILS RELATIVE PERCENT: 1.5 % (ref 0–6)
GFR AFRICAN AMERICAN: >60
GFR NON-AFRICAN AMERICAN: >60 ML/MIN/1.73
GLUCOSE BLD-MCNC: 108 MG/DL (ref 74–99)
GLUCOSE URINE: NEGATIVE MG/DL
HBA1C MFR BLD: 5.3 % (ref 4–5.6)
HCT VFR BLD CALC: 39.3 % (ref 34–48)
HDLC SERPL-MCNC: 61 MG/DL
HEMOGLOBIN: 13.4 G/DL (ref 11.5–15.5)
IMMATURE GRANULOCYTES #: 0.02 E9/L
IMMATURE GRANULOCYTES %: 0.6 % (ref 0–5)
KETONES, URINE: NEGATIVE MG/DL
LDL CHOLESTEROL CALCULATED: 107 MG/DL (ref 0–99)
LEUKOCYTE ESTERASE, URINE: ABNORMAL
LYMPHOCYTES ABSOLUTE: 0.45 E9/L (ref 1.5–4)
LYMPHOCYTES RELATIVE PERCENT: 13.2 % (ref 20–42)
MCH RBC QN AUTO: 30.6 PG (ref 26–35)
MCHC RBC AUTO-ENTMCNC: 34.1 % (ref 32–34.5)
MCV RBC AUTO: 89.7 FL (ref 80–99.9)
MONOCYTES ABSOLUTE: 0.28 E9/L (ref 0.1–0.95)
MONOCYTES RELATIVE PERCENT: 8.2 % (ref 2–12)
NEUTROPHILS ABSOLUTE: 2.58 E9/L (ref 1.8–7.3)
NEUTROPHILS RELATIVE PERCENT: 75.6 % (ref 43–80)
NITRITE, URINE: NEGATIVE
OVALOCYTES: ABNORMAL
PDW BLD-RTO: 13.1 FL (ref 11.5–15)
PH UA: 6 (ref 5–9)
PLATELET # BLD: 208 E9/L (ref 130–450)
PMV BLD AUTO: 11 FL (ref 7–12)
POIKILOCYTES: ABNORMAL
POLYCHROMASIA: ABNORMAL
POTASSIUM SERPL-SCNC: 4.2 MMOL/L (ref 3.5–5)
PROTEIN UA: NEGATIVE MG/DL
RBC # BLD: 4.38 E12/L (ref 3.5–5.5)
RBC UA: NORMAL /HPF (ref 0–2)
SODIUM BLD-SCNC: 142 MMOL/L (ref 132–146)
SPECIFIC GRAVITY UA: 1.02 (ref 1–1.03)
T4 FREE: 1.7 NG/DL (ref 0.93–1.7)
TARGET CELLS: ABNORMAL
TOTAL PROTEIN: 7.1 G/DL (ref 6.4–8.3)
TRIGL SERPL-MCNC: 145 MG/DL (ref 0–149)
TSH SERPL DL<=0.05 MIU/L-ACNC: 1.36 UIU/ML (ref 0.27–4.2)
UROBILINOGEN, URINE: 0.2 E.U./DL
VLDLC SERPL CALC-MCNC: 29 MG/DL
WBC # BLD: 3.4 E9/L (ref 4.5–11.5)
WBC UA: NORMAL /HPF (ref 0–5)

## 2022-05-11 PROCEDURE — 81003 URINALYSIS AUTO W/O SCOPE: CPT | Performed by: NURSE PRACTITIONER

## 2022-05-12 LAB — VITAMIN D 25-HYDROXY: 24 NG/ML (ref 30–100)

## 2022-05-13 ENCOUNTER — TELEPHONE (OUTPATIENT)
Dept: FAMILY MEDICINE CLINIC | Age: 67
End: 2022-05-13

## 2022-05-13 DIAGNOSIS — N39.0 UTI DUE TO KLEBSIELLA SPECIES: Primary | ICD-10-CM

## 2022-05-13 DIAGNOSIS — B96.89 UTI DUE TO KLEBSIELLA SPECIES: Primary | ICD-10-CM

## 2022-05-13 LAB
ORGANISM: ABNORMAL
URINE CULTURE, ROUTINE: ABNORMAL
URINE CULTURE, ROUTINE: ABNORMAL

## 2022-05-13 RX ORDER — LEVOFLOXACIN 750 MG/1
750 TABLET ORAL DAILY
Qty: 7 TABLET | Refills: 0 | Status: SHIPPED | OUTPATIENT
Start: 2022-05-13 | End: 2022-05-20

## 2022-05-13 NOTE — TELEPHONE ENCOUNTER
Call Juanita, it is a good thing we waited for the culture but why did she wait a week to get the urine done? She has Klebsiella oxytoca Abnormal  I will send in levofloxacin 750 mg every 24 ours x 7 days needs to start today this is a dangerous bacteria. If she develops any fever, chills she needs to go to the ER. She needs to have a repeat urine culture in 10 days from finishing the antibiotics.

## 2022-06-01 DIAGNOSIS — N39.0 URINARY TRACT INFECTION WITHOUT HEMATURIA, SITE UNSPECIFIED: ICD-10-CM

## 2022-06-01 DIAGNOSIS — N39.0 UTI DUE TO KLEBSIELLA SPECIES: ICD-10-CM

## 2022-06-01 DIAGNOSIS — N39.0 URINARY TRACT INFECTION WITHOUT HEMATURIA, SITE UNSPECIFIED: Primary | ICD-10-CM

## 2022-06-01 DIAGNOSIS — B96.89 UTI DUE TO KLEBSIELLA SPECIES: ICD-10-CM

## 2022-06-04 LAB
ORGANISM: ABNORMAL
ORGANISM: ABNORMAL
URINE CULTURE, ROUTINE: ABNORMAL

## 2022-06-06 DIAGNOSIS — N30.00 ACUTE CYSTITIS WITHOUT HEMATURIA: Primary | ICD-10-CM

## 2022-06-06 DIAGNOSIS — N30.00 ACUTE CYSTITIS WITHOUT HEMATURIA: ICD-10-CM

## 2022-06-06 RX ORDER — SULFAMETHOXAZOLE AND TRIMETHOPRIM 800; 160 MG/1; MG/1
1 TABLET ORAL 2 TIMES DAILY
Qty: 20 TABLET | Refills: 0 | Status: SHIPPED | OUTPATIENT
Start: 2022-06-06 | End: 2022-06-16

## 2022-06-22 DIAGNOSIS — R35.0 URINARY FREQUENCY: Primary | ICD-10-CM

## 2022-06-22 DIAGNOSIS — R35.0 URINARY FREQUENCY: ICD-10-CM

## 2022-06-25 DIAGNOSIS — N39.0 RECURRENT UTI: Primary | ICD-10-CM

## 2022-06-25 LAB
ORGANISM: ABNORMAL
URINE CULTURE, ROUTINE: ABNORMAL
URINE CULTURE, ROUTINE: ABNORMAL

## 2022-06-25 RX ORDER — NITROFURANTOIN 25; 75 MG/1; MG/1
100 CAPSULE ORAL 2 TIMES DAILY
Qty: 20 CAPSULE | Refills: 0 | Status: SHIPPED | OUTPATIENT
Start: 2022-06-25 | End: 2022-07-05

## 2022-06-28 ENCOUNTER — OFFICE VISIT (OUTPATIENT)
Dept: FAMILY MEDICINE CLINIC | Age: 67
End: 2022-06-28
Payer: MEDICARE

## 2022-06-28 VITALS
WEIGHT: 293 LBS | RESPIRATION RATE: 17 BRPM | DIASTOLIC BLOOD PRESSURE: 61 MMHG | OXYGEN SATURATION: 95 % | SYSTOLIC BLOOD PRESSURE: 132 MMHG | HEART RATE: 81 BPM | BODY MASS INDEX: 39.68 KG/M2 | HEIGHT: 72 IN | TEMPERATURE: 97.4 F
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
## 2022-06-28 DIAGNOSIS — M25.562 ACUTE PAIN OF LEFT KNEE: ICD-10-CM

## 2022-06-28 DIAGNOSIS — R22.42 LOCALIZED SWELLING OF LEFT FOOT: ICD-10-CM

## 2022-06-28 DIAGNOSIS — R60.0 EDEMA OF LEFT LOWER LEG: ICD-10-CM

## 2022-06-28 DIAGNOSIS — R93.89 ABNORMAL FINDING ON IMAGING: ICD-10-CM

## 2022-06-28 DIAGNOSIS — M79.672 ACUTE FOOT PAIN, LEFT: ICD-10-CM

## 2022-06-28 PROCEDURE — 99214 OFFICE O/P EST MOD 30 MIN: CPT | Performed by: NURSE PRACTITIONER

## 2022-06-28 PROCEDURE — 1123F ACP DISCUSS/DSCN MKR DOCD: CPT | Performed by: NURSE PRACTITIONER

## 2022-06-28 RX ORDER — FUROSEMIDE 20 MG/1
TABLET ORAL
Qty: 90 TABLET | Refills: 1 | Status: SHIPPED | OUTPATIENT
Start: 2022-06-28

## 2022-06-28 RX ORDER — PREDNISONE 20 MG/1
20 TABLET ORAL 2 TIMES DAILY
Qty: 10 TABLET | Refills: 0 | Status: SHIPPED | OUTPATIENT
Start: 2022-06-28 | End: 2022-07-03

## 2022-06-28 NOTE — PROGRESS NOTES
Chief Complaint   Foot Injury (LEFT FOOT SHE FELL IN POOL A WEEK AGO , SHE CANT GET SWELLING TO GO DOWN)    History of Present Illness   Source of history provided by:  patientLashay Mcnair is a 69 y.o. old female presenting to the walk in clinic for evaluation of left ankle/knee pain over 7 days ago. Patient reports the pain began after falling in pool & reports bilateral feet bent under her body. Reports pain is present in the entire foot with swelling in left leg. Reports improvement of associated swelling and bruising. Denies any paresthesias, knee pain, weakness, fever, chills, or abrasions. Pt states there is increased pain with ambulation. Has been taking motrin OTC with minimal symptomatic relief. Denies any hx of previous injuries or surgeries at the site. Hasn't taken Lasix/Potassium chloride in weeks because has been treated for UTI for over 3 weeks with antibiotics. ROS    Unless otherwise stated in this report or unable to obtain because of the patient's clinical or mental status as evidenced by the medical record, this patients's positive and negative responses for Review of Systems, constitutional, psych, eyes, ENT, cardiovascular, respiratory, gastrointestinal, neurological, genitourinary, musculoskeletal, integument systems and systems related to the presenting problem are either stated in the preceding or were not pertinent or were negative for the symptoms and/or complaints related to the medical problem. Past Medical History:  has a past medical history of Anxiety and depression, Bacteremia, Chronic back pain, Congestive heart failure ([address] Diverticulitis, GERD (gastroesophageal reflux disease), Gram-negative bacteremia, Hypothyroidism, Lumbar spondylosis, Neuropathy, Obesity, Obstructive sleep apnea syndrome, Rheumatoid arthritis involving multiple sites with positive rheumatoid factor ([address] and Thyroid disease.   Past Surgical History:  has a past surgical history that includes Appendectomy; Tonsillectomy; Cholecystectomy, laparoscopic (02/23/2017); other surgical history (04/11/2017); Colonoscopy (09/11/2017); other surgical history (09/12/2017); Nerve Block (Left, 05/05/2020); Nerve Block (Left, 05/05/2020); Anesthesia Nerve Block (Left, 5/5/2020); Nerve Block (Left, 06/04/2020); Pain management procedure (Left, 6/4/2020); Nerve Block (Left, 07/14/2020); and Pain management procedure (Left, 7/14/2020). Social History:  reports that she quit smoking about 35 years ago. She smoked 0.00 packs per day for 0.00 years. She has never used smokeless tobacco. She reports that she does not drink alcohol and does not use drugs. Family History: family history includes Breast Cancer in her mother; Diabetes in her mother; Heart Disease in her father; Other in her father. Allergies: Enbrel [etanercept], Humira [adalimumab], and Penicillins    Physical Exam         VS:  /61   Pulse 81   Temp 97.4 °F (36.3 °C) (Temporal)   Resp 17   Ht 6' (1.829 m)   Wt (!) 369 lb (167.4 kg)   SpO2 95%   BMI 50.05 kg/m²    Oxygen Saturation Interpretation: Abnormal - but at baseline. Constitutional:  Alert, development consistent with age. Neck:  Normal ROM. Supple. Chest: Heart RRR without pathologic murmurs or gallops. Lungs CTAB without W/R/R. Foot: Tenderness: Moderate            Swelling: Moderate              Deformity: No obvious deformity. ROM: Limited ROM due to pain. Skin:  No rash, abrasions, or erythema noted. Ecchymosis noted. Neurovascular:              Sensory deficit: Sensation intact proximal and distal to the injury site. Pulse deficit: Pulses 2+ and bounding. Capillary refill: Less then 2 sec throughout. Ankle:               Tenderness: Moderate              Swelling: Moderate            Deformity: No obvious deformity noted. ROM: Limited ROM due to pain.   (-) Brodie sign            Skin:  No abrasions, erythema, or rashes noted. Gait: Pt remained in wheelchair during examination. Lymphatics: No lymphangitis or adenopathy noted. Neurological:  Alert and oriented. Motor functions intact. Lab / Imaging Results   (All laboratory and radiology results have been personally reviewed by myself)  Labs:  No results found for this visit on 06/28/22. Imaging: All Radiology results interpreted by Radiologist unless otherwise noted. Narrative   EXAMINATION:   THREE XRAY VIEWS OF THE LEFT FOOT       6/28/2022 3:57 pm       COMPARISON:   None.       HISTORY:   ORDERING SYSTEM PROVIDED HISTORY: Injury due to fall, initial encounter   TECHNOLOGIST PROVIDED HISTORY:   Reason for exam:->see above       FINDINGS:   AP, lateral, and oblique views of the left foot were obtained.       There is no acute fracture or dislocation.  Joint spaces are preserved.  No   bony destruction or periosteal reaction.  There are posterior and plantar   calcaneal spurs.  There is soft tissue thickening in the region of the distal   Achilles tendon.  There is mild soft tissue swelling throughout the foot.           Impression   1. Soft tissue swelling without acute fracture or dislocation. 2. Soft tissue thickening in the region of the distal Achilles tendon could   represent tendinopathy or tear. Assessment / Plan     Impression:  Beth Or was seen today for foot injury. Diagnoses and all orders for this visit:    Injury due to fall, initial encounter  -     XR FOOT LEFT (MIN 3 VIEWS); Future  - Pt reports injury in pool, denies falls usually    Acute foot pain, left /Localized swelling of left foot/Acute pain of left knee  -     XR FOOT LEFT (MIN 3 VIEWS); Future  -     US DUP LOWER EXTREMITY LEFT KELLY; Future  - OTC Tylenol for pain reliever    Edema of left lower leg  -     US DUP LOWER EXTREMITY LEFT KELLY; Future  -     predniSONE (DELTASONE) 20 MG tablet;  Take 1 tablet by mouth 2 times daily for 5 days  - Specialty refilled Lasix, pt agreeable increase dosage to 40 mg daily Am x 3 days while monitoring BP    Order given for imaging, will call with results once available. Script written side effects discussed. Ace bandage provided for support. RICE protocol advised. F/u PCP in 1 week for recheck if symptoms persist. ED sooner if symptoms worsen or change. ED immediately with worsening pain/swelling, calf pain/swelling, fever, paresthesias, weakness, CP, or SOB. Pt is in agreement with this care plan. All questions answered. Syed Goldberg, APRN - CNP    **This report was transcribed using voice recognition software. Every effort was made to ensure accuracy; however, inadvertent computerized transcription errors may be present.

## 2022-06-30 ENCOUNTER — TELEPHONE (OUTPATIENT)
Dept: CARDIOLOGY CLINIC | Age: 67
End: 2022-06-30

## 2022-06-30 NOTE — TELEPHONE ENCOUNTER
Spoke to patient to schedule her follow up visit - she states she was told that she didn't need to come back.   Anything that comes up - she will call

## 2022-07-08 DIAGNOSIS — F32.A ANXIETY AND DEPRESSION: Chronic | ICD-10-CM

## 2022-07-08 DIAGNOSIS — F41.9 ANXIETY AND DEPRESSION: Chronic | ICD-10-CM

## 2022-07-08 DIAGNOSIS — K21.9 GASTROESOPHAGEAL REFLUX DISEASE WITHOUT ESOPHAGITIS: Chronic | ICD-10-CM

## 2022-07-08 DIAGNOSIS — E06.3 HYPOTHYROIDISM DUE TO HASHIMOTO'S THYROIDITIS: ICD-10-CM

## 2022-07-08 DIAGNOSIS — E03.8 HYPOTHYROIDISM DUE TO HASHIMOTO'S THYROIDITIS: ICD-10-CM

## 2022-07-08 RX ORDER — SERTRALINE HYDROCHLORIDE 100 MG/1
100 TABLET, FILM COATED ORAL NIGHTLY
Qty: 90 TABLET | Refills: 0 | Status: SHIPPED
Start: 2022-07-08 | End: 2022-10-04

## 2022-07-08 RX ORDER — LEVOTHYROXINE SODIUM 137 UG/1
TABLET ORAL
Qty: 90 TABLET | Refills: 0 | Status: SHIPPED
Start: 2022-07-08 | End: 2022-10-04

## 2022-07-08 RX ORDER — OMEPRAZOLE 40 MG/1
CAPSULE, DELAYED RELEASE ORAL
Qty: 90 CAPSULE | Refills: 0 | Status: SHIPPED
Start: 2022-07-08 | End: 2022-10-04

## 2022-07-15 ENCOUNTER — HOSPITAL ENCOUNTER (OUTPATIENT)
Dept: MAMMOGRAPHY | Age: 67
Discharge: HOME | End: 2022-07-17
Payer: MEDICARE

## 2022-07-15 VITALS — BODY MASS INDEX: 39.68 KG/M2 | HEIGHT: 72 IN | WEIGHT: 293 LBS

## 2022-07-15 DIAGNOSIS — Z12.31 SCREENING MAMMOGRAM FOR BREAST CANCER: ICD-10-CM

## 2022-07-15 PROCEDURE — 77063 BREAST TOMOSYNTHESIS BI: CPT

## 2022-07-21 DIAGNOSIS — N39.0 RECURRENT UTI: Primary | ICD-10-CM

## 2022-07-26 DIAGNOSIS — N39.0 RECURRENT UTI: ICD-10-CM

## 2022-07-26 LAB
BACTERIA: ABNORMAL /HPF
BILIRUBIN URINE: NEGATIVE
BLOOD, URINE: ABNORMAL
CLARITY: ABNORMAL
COLOR: YELLOW
EPITHELIAL CELLS, UA: ABNORMAL /HPF
GLUCOSE URINE: NEGATIVE MG/DL
KETONES, URINE: NEGATIVE MG/DL
LEUKOCYTE ESTERASE, URINE: ABNORMAL
NITRITE, URINE: NEGATIVE
PH UA: 6 (ref 5–9)
PROTEIN UA: 30 MG/DL
RBC UA: ABNORMAL /HPF (ref 0–2)
SPECIFIC GRAVITY UA: 1.02 (ref 1–1.03)
UROBILINOGEN, URINE: 0.2 E.U./DL
WBC UA: ABNORMAL /HPF (ref 0–5)

## 2022-07-26 PROCEDURE — 81003 URINALYSIS AUTO W/O SCOPE: CPT | Performed by: NURSE PRACTITIONER

## 2022-07-27 DIAGNOSIS — N39.0 RECURRENT UTI: Primary | ICD-10-CM

## 2022-07-28 LAB
ALBUMIN SERPL-MCNC: 4.5 G/DL
ALP BLD-CCNC: 131 U/L
ALT SERPL-CCNC: 39 U/L
ANION GAP SERPL CALCULATED.3IONS-SCNC: NORMAL MMOL/L
AST SERPL-CCNC: 31 U/L
BASOPHILS ABSOLUTE: 0.5 /ΜL
BASOPHILS RELATIVE PERCENT: 21 %
BILIRUB SERPL-MCNC: 1.1 MG/DL (ref 0.1–1.4)
BUN BLDV-MCNC: NORMAL MG/DL
CALCIUM SERPL-MCNC: 9.3 MG/DL
CHLORIDE BLD-SCNC: 104 MMOL/L
CO2: 29 MMOL/L
CREAT SERPL-MCNC: NORMAL MG/DL
EOSINOPHILS ABSOLUTE: 1.5 /ΜL
EOSINOPHILS RELATIVE PERCENT: 62 %
GFR CALCULATED: 73
GLUCOSE BLD-MCNC: 129 MG/DL
HCT VFR BLD CALC: 37.6 % (ref 36–46)
HEMOGLOBIN: 13 G/DL (ref 12–16)
LYMPHOCYTES ABSOLUTE: 13.6 /ΜL
LYMPHOCYTES RELATIVE PERCENT: 558 %
MCH RBC QN AUTO: 30.5 PG
MCHC RBC AUTO-ENTMCNC: 34.6 G/DL
MCV RBC AUTO: 88.3 FL
MONOCYTES ABSOLUTE: 10.4 /ΜL
MONOCYTES RELATIVE PERCENT: 426 %
NEUTROPHILS ABSOLUTE: 74 /ΜL
NEUTROPHILS RELATIVE PERCENT: 3034 %
PDW BLD-RTO: 13.4 %
PLATELET # BLD: 229 K/ΜL
PMV BLD AUTO: 10 FL
POTASSIUM SERPL-SCNC: 4 MMOL/L
RBC # BLD: 4.26 10^6/ΜL
SODIUM BLD-SCNC: 141 MMOL/L
TOTAL PROTEIN: 7.1
WBC # BLD: 4.1 10^3/ML

## 2022-07-29 LAB — URINE CULTURE, ROUTINE: NORMAL

## 2022-08-05 RX ORDER — CIPROFLOXACIN 500 MG/1
500 TABLET, FILM COATED ORAL 2 TIMES DAILY
Qty: 20 TABLET | Refills: 0 | Status: SHIPPED
Start: 2022-08-05 | End: 2022-10-07 | Stop reason: SDUPTHER

## 2022-08-08 SDOH — HEALTH STABILITY: PHYSICAL HEALTH: ON AVERAGE, HOW MANY DAYS PER WEEK DO YOU ENGAGE IN MODERATE TO STRENUOUS EXERCISE (LIKE A BRISK WALK)?: 0 DAYS

## 2022-08-08 ASSESSMENT — PATIENT HEALTH QUESTIONNAIRE - PHQ9
SUM OF ALL RESPONSES TO PHQ QUESTIONS 1-9: 0
3. TROUBLE FALLING OR STAYING ASLEEP: 0
SUM OF ALL RESPONSES TO PHQ QUESTIONS 1-9: 0
4. FEELING TIRED OR HAVING LITTLE ENERGY: 0
6. FEELING BAD ABOUT YOURSELF - OR THAT YOU ARE A FAILURE OR HAVE LET YOURSELF OR YOUR FAMILY DOWN: 0
9. THOUGHTS THAT YOU WOULD BE BETTER OFF DEAD, OR OF HURTING YOURSELF: 0
5. POOR APPETITE OR OVEREATING: 0
SUM OF ALL RESPONSES TO PHQ9 QUESTIONS 1 & 2: 0
2. FEELING DOWN, DEPRESSED OR HOPELESS: 0
7. TROUBLE CONCENTRATING ON THINGS, SUCH AS READING THE NEWSPAPER OR WATCHING TELEVISION: 0
1. LITTLE INTEREST OR PLEASURE IN DOING THINGS: 0
SUM OF ALL RESPONSES TO PHQ QUESTIONS 1-9: 0
8. MOVING OR SPEAKING SO SLOWLY THAT OTHER PEOPLE COULD HAVE NOTICED. OR THE OPPOSITE, BEING SO FIGETY OR RESTLESS THAT YOU HAVE BEEN MOVING AROUND A LOT MORE THAN USUAL: 0
SUM OF ALL RESPONSES TO PHQ QUESTIONS 1-9: 0
10. IF YOU CHECKED OFF ANY PROBLEMS, HOW DIFFICULT HAVE THESE PROBLEMS MADE IT FOR YOU TO DO YOUR WORK, TAKE CARE OF THINGS AT HOME, OR GET ALONG WITH OTHER PEOPLE: 0

## 2022-08-08 ASSESSMENT — LIFESTYLE VARIABLES
HOW MANY STANDARD DRINKS CONTAINING ALCOHOL DO YOU HAVE ON A TYPICAL DAY: PATIENT DOES NOT DRINK
HOW MANY STANDARD DRINKS CONTAINING ALCOHOL DO YOU HAVE ON A TYPICAL DAY: 0
HOW OFTEN DO YOU HAVE A DRINK CONTAINING ALCOHOL: NEVER
HOW OFTEN DO YOU HAVE A DRINK CONTAINING ALCOHOL: 1
HOW OFTEN DO YOU HAVE SIX OR MORE DRINKS ON ONE OCCASION: 1

## 2022-08-18 ENCOUNTER — OFFICE VISIT (OUTPATIENT)
Dept: FAMILY MEDICINE CLINIC | Age: 67
End: 2022-08-18
Payer: MEDICARE

## 2022-08-18 VITALS
RESPIRATION RATE: 16 BRPM | TEMPERATURE: 97.5 F | HEIGHT: 72 IN | DIASTOLIC BLOOD PRESSURE: 80 MMHG | SYSTOLIC BLOOD PRESSURE: 126 MMHG | WEIGHT: 293 LBS | BODY MASS INDEX: 39.68 KG/M2 | OXYGEN SATURATION: 95 % | HEART RATE: 82 BPM

## 2022-08-18 DIAGNOSIS — Z23 NEED FOR SHINGLES VACCINE: ICD-10-CM

## 2022-08-18 DIAGNOSIS — R29.6 FALLS FREQUENTLY: ICD-10-CM

## 2022-08-18 DIAGNOSIS — Z00.00 MEDICARE ANNUAL WELLNESS VISIT, SUBSEQUENT: Primary | ICD-10-CM

## 2022-08-18 DIAGNOSIS — Z23 NEED FOR DIPHTHERIA-TETANUS-PERTUSSIS (TDAP) VACCINE: ICD-10-CM

## 2022-08-18 PROCEDURE — G0439 PPPS, SUBSEQ VISIT: HCPCS | Performed by: NURSE PRACTITIONER

## 2022-08-18 PROCEDURE — 1123F ACP DISCUSS/DSCN MKR DOCD: CPT | Performed by: NURSE PRACTITIONER

## 2022-08-18 RX ORDER — ZOSTER VACCINE RECOMBINANT, ADJUVANTED 50 MCG/0.5
0.5 KIT INTRAMUSCULAR SEE ADMIN INSTRUCTIONS
Qty: 0.5 ML | Refills: 1 | Status: SHIPPED | OUTPATIENT
Start: 2022-08-18 | End: 2023-02-14

## 2022-08-18 SDOH — ECONOMIC STABILITY: FOOD INSECURITY: WITHIN THE PAST 12 MONTHS, YOU WORRIED THAT YOUR FOOD WOULD RUN OUT BEFORE YOU GOT MONEY TO BUY MORE.: NEVER TRUE

## 2022-08-18 SDOH — ECONOMIC STABILITY: INCOME INSECURITY: IN THE LAST 12 MONTHS, WAS THERE A TIME WHEN YOU WERE NOT ABLE TO PAY THE MORTGAGE OR RENT ON TIME?: NO

## 2022-08-18 SDOH — ECONOMIC STABILITY: FOOD INSECURITY: WITHIN THE PAST 12 MONTHS, THE FOOD YOU BOUGHT JUST DIDN'T LAST AND YOU DIDN'T HAVE MONEY TO GET MORE.: NEVER TRUE

## 2022-08-18 ASSESSMENT — SOCIAL DETERMINANTS OF HEALTH (SDOH): HOW HARD IS IT FOR YOU TO PAY FOR THE VERY BASICS LIKE FOOD, HOUSING, MEDICAL CARE, AND HEATING?: NOT HARD AT ALL

## 2022-08-18 NOTE — ASSESSMENT & PLAN NOTE
Common side effects of Td vaccination include soreness in the arm where you got the shot and a mild fever.  These usually occur within 3 days of the shot and last a short time

## 2022-08-18 NOTE — PATIENT INSTRUCTIONS
Learning About Healthy Weight  What is a healthy weight? A healthy weight is the weight at which you feel good about yourself and have energy for work and play. It's also one that lowers your risk for healthproblems. What can you do to stay at a healthy weight? It can be hard to stay at a healthy weight, especially when fast food, vending-machine snacks, and processed foods are so easy to find. And with your busy lifestyle, activity may be low on your list of things to do. But stayingat a healthy weight may be easier than you think. Here are some dos and don'ts for staying at a healthy weight. Do eat healthy foods  The kinds of foods you eat have a big impact on both your weight and your health. Reaching and staying at a healthy weight is not about going on a diet. It's about making healthier food choices every day and changing your diet forgood. Healthy eating means eating a variety of foods so that you get all the nutrients you need. Your body needs protein, carbohydrate, and fats for energy. They keep your heart beating, your brain active, and your muscles working. On most days, try to eat from each food group. This means eating a variety of: Whole grains, such as whole wheat breads and pastas. Fruits and vegetables. Dairy products, such as low-fat milk, yogurt, and cheese. Lean proteins, such as all types of fish, chicken without the skin, and beans. Don't have too much or too little of one thing. All foods, if eaten inmoderation, can be part of healthy eating. Even sweets can be okay. If your favorite foods are high in fat, salt, sugar, or calories, limit howoften you eat them. Eat smaller servings, or look for healthy substitutes. Do watch what you eat  Many people eat more than their bodies need. Part of staying at a healthy weight means learning how much food you really need from day to day and not eating more than that.  Even with healthy foods, eating too much can make yougain weight. Having a well-balanced diet means that you eat enough, but not too much, and that your food gives you the nutrients you need to stay healthy. So listen toyour body. Eat when you're hungry. Stop when you feel satisfied. It's a good idea to have healthy snacks ready for when you get hungry. Keep healthy snacks with you at work, in your car, and at home. If you have a healthy snack easily available, you'll be less likely to pick a candy bar orbag of chips from a vending machine instead. Some healthy snacks you might want to keep on hand are fruit, low-fat yogurt, string cheese, low-fat microwave popcorn, raisins and other dried fruit, nuts,whole wheat crackers, pretzels, carrots, celery sticks, and broccoli. Do some physical activity  A big part of reaching and staying at a healthy weight is being active. When you're active, you burn calories. This makes it easier to reach and stay at a healthy weight. When you're active on a regular basis, your body burns more calories, even when you're at rest. Being active helps you lose fat andbuild lean muscle. Try to be active for at least 1 hour every day. This may sound like a lot, but it's okay to be active in smaller blocks of time that add up to 1 hour a day. Any activity that makes your heart beat faster and keeps it there for a while counts. A brisk walk, run, or swim will get your heart beating faster. So will climbing stairs, shooting baskets, or cycling. Even some household chores likevacuuming and mowing the lawn will get your heart rate up. Pick activities that you enjoy--ones that make your heart beat faster, your muscles stronger, and your muscles and joints more flexible. If you find more than one thing you like doing, do them all. You don't have to do the same thingevery day. Don't diet  Diets don't work. Diets are temporary. Because you give up so much when you diet, you may be hungry and think about food all the time.  And after you stop dieting, you also may overeat to make up for what you missed. Most people who diet end up gainingback the pounds they lost--and more. Remember that healthy bodies come in lots of shapes and sizes. Everyone can gethealthier by eating better and being more active. Where can you learn more? Go to https://chpepiceweb.[email or web address] org and sign in to your [employer] account. Enter 808 3915 in the [employer] box to learn more about \"Learning About Healthy Weight. \"     If you do not have an account, please click on the \"Sign Up Now\" link. Current as of: December 27, 2021               Content Version: 13.3  © 2006-2022 Healthwise, [employer]. Care instructions adapted under license by [facility] ([facility]). If you have questions about a medical condition or this instruction, always ask your healthcare professional. [place] [address] any warranty or liability for your use of this information. Learning About Low-Carbohydrate Diets  What is a low-carbohydrate diet? A low-carbohydrate (or \"low-carb\") diet limits foods and drinks that have carbohydrates. This includes grains, fruits, milk and yogurt, and starchy vegetables like potatoes, beans, and corn. It also avoids foods and drinks that have added sugar. Instead, low-carb diets include foods that are high inprotein and fat. Why might you follow a low-carb diet? Low-carb diets may be used for a variety of reasons, such as for weight loss. People who have diabetes may use a low-carb diet to help manage their bloodsugar levels. What should you do before you start the diet? Talk to your doctor before you try any diet. This is even more important if you have health problems like kidney disease, heart disease, or diabetes. Your doctor may suggest that you meet with a registered dietitian. A dietitian canhelp you make an eating plan that works for you. What foods do you eat on a low-carb diet?   On a low-carb diet, you choose foods that are high in protein and fat. Examplesof these are:  Meat, poultry, and fish. Eggs. Nuts, such as walnuts, pecans, almonds, and peanuts. Peanut butter and other nut butters. Tofu. Avocado. Karlynn Roch. Non-starchy vegetables like broccoli, cauliflower, green beans, mushrooms, peppers, lettuce, and spinach. Unsweetened non-dairy milks like almond milk and coconut milk. Cheese, cottage cheese, and cream cheese. Where can you learn more? Go to https://Zayantepepiceweb.[employer]. org and sign in to your [employer] account. Enter C335 in the [employer] box to learn more about \"Learning About Low-Carbohydrate Diets. \"     If you do not have an account, please click on the \"Sign Up Now\" link. Current as of: September 8, 2021               Content Version: 13.3  © 6753-1493 [employer]. Care instructions adapted under license by [facility] ([facility]). If you have questions about a medical condition or this instruction, always ask your healthcare professional. [place] [address] any warranty or liability for your use of this information. Personalized Preventive Plan for Ethan Manning - 8/18/2022  Medicare offers a range of preventive health benefits. Some of the tests and screenings are paid in full while other may be subject to a deductible, co-insurance, and/or copay. Some of these benefits include a comprehensive review of your medical history including lifestyle, illnesses that may run in your family, and various assessments and screenings as appropriate. After reviewing your medical record and screening and assessments performed today your provider may have ordered immunizations, labs, imaging, and/or referrals for you. A list of these orders (if applicable) as well as your Preventive Care list are included within your After Visit Summary for your review.     Other Preventive Recommendations:    A preventive eye exam performed by an eye specialist is recommended every 1-2 years

## 2022-08-18 NOTE — PROGRESS NOTES
Medicare Annual Wellness Visit    Jack Iyer is here for Medicare AWV    Assessment & Plan   Medicare annual wellness visit, subsequent  Falls frequently  Assessment & Plan: On the basis of positive falls risk screening, assessment and plan is as follows: in-office gait and balance testing performed using The Timed Up and Go Test was positive for increased falls risk, home safety tips provided, referral to PT provided today. Orders:  -     Amb External Referral To Physical Therapy  Need for shingles vaccine  Assessment & Plan:   .shinglesvacc  A sore arm with mild or moderate pain is very common after recombinant shingles vaccine, affecting about 80% of vaccinated people. Redness and swelling can also happen at the site of the injection. Tiredness, muscle pain, headache, shivering, fever, stomach pain, and nausea happen after vaccination in more than half of people who receive recombinant shingles vaccine. Orders:  -     zoster recombinant adjuvanted vaccine [facility]) 50 MCG/0.5ML SUSR injection; Inject 0.5 mLs into the muscle See Admin Instructions 1 dose now and repeat in 2-6 months, Disp-0.5 mL, R-1Print  Need for diphtheria-tetanus-pertussis (Tdap) vaccine  Assessment & Plan:   Common side effects of Td vaccination include soreness in the arm where you got the shot and a mild fever. These usually occur within 3 days of the shot and last a short time  Orders:  -     Tetanus-Diphth-Acell Pertussis ([address]) 5-2.5-18.5 LF-MCG/0.5 injection; Inject 0.5 mLs into the muscle once for 1 dose, Disp-0.5 mL, R-0Print    Recommendations for Preventive Services Due: see orders and patient instructions/AVS.  Recommended screening schedule for the next 5-10 years is provided to the patient in written form: see Patient Instructions/AVS.     Return in 3 months (on 11/18/2022) for Medicare Annual Wellness Visit in 1 year, HTN, hypothyroid.      Subjective       Patient's complete Health Risk Assessment and screening values have been reviewed and are found in Flowsheets. The following problems were reviewed today and where indicated follow up appointments were made and/or referrals ordered. Positive Risk Factor Screenings with Interventions:    Fall Risk:  Do you feel unsteady or are you worried about falling? : (!) yes  2 or more falls in past year?: (!) yes  Fall with injury in past year?: (!) yes   Fall Risk Interventions:    Home safety tips provided  Home exercises provided to promote strength and balance  Physical therapy referral ordered for strength and balance training            General Health and ACP:  General  In general, how would you say your health is?: Fair  In the past 7 days, have you experienced any of the following: New or Increased Pain, New or Increased Fatigue, Loneliness, Social Isolation, Stress or Anger?: No  Do you get the social and emotional support that you need?: Yes  Do you have a Living Will?: (!) No    Advance Directives       Power of  Living Will ACP-Advance Directive ACP-Power of     Not on File Not on File Not on File Not on File        General Health Risk Interventions:  No Living Will: ACP documents already completed- patient asked to provide copy to the office    Health Habits/Nutrition:  Physical Activity: Unknown    Days of Exercise per Week: 0 days    Minutes of Exercise per Session: Not on file     Have you lost any weight without trying in the past 3 months?: No  Body mass index: (!) 47.6  Have you seen the dentist within the past year?: (!) No  Health Habits/Nutrition Interventions:  Inadequate physical activity:  referral placed for PT  Dental exam overdue:  patient encouraged to make appointment with his/her dentist             Objective   Vitals:    08/18/22 1257   BP: 126/80   Pulse: 82   Resp: 16   Temp: 97.5 °F (36.4 °C)   SpO2: 95%   Weight: (!) 351 lb (159.2 kg)   Height: 6' (1.829 m)      Body mass index is 47.6 kg/m².       General Appearance: alert and oriented to person, place and time, well developed and well- nourished, morbidly obese, in no acute distress  Skin: warm and dry, no rash or erythema  Head: normocephalic and atraumatic  Eyes: pupils equal, round, and reactive to light, extraocular eye movements intact, conjunctivae normal  ENT: tympanic membrane retracted bilateral no evidence of effusion external ear and ear canal normal bilaterally, nose without deformity, nasal mucosa and turbinates normal without polyps  Neck: supple and non-tender without mass, no thyromegaly or thyroid nodules, no cervical lymphadenopathy  Pulmonary/Chest: clear to auscultation bilaterally- no wheezes, rales or rhonchi, normal air movement, no respiratory distress  Cardiovascular: normal rate, regular rhythm, normal S1 and S2, no murmurs, rubs, clicks, or gallops, distal pulses intact, no carotid bruits  Abdomen: soft, non-tender, non-distended, normal bowel sounds, no masses or organomegaly  Extremities: no cyanosis, clubbing or edema  Musculoskeletal: normal range of motion, no joint swelling, deformity or tenderness  Neurologic: reflexes normal and symmetric, no cranial nerve deficit, gait, coordination and speech normal       Allergies   Allergen Reactions    Enbrel [Etanercept]      Swelling    Humira [Adalimumab]      Swelling     Penicillins Swelling and Rash     n/v     Prior to Visit Medications    Medication Sig Taking?  Authorizing Provider   zoster recombinant adjuvanted vaccine (SHINGRIX) 50 MCG/0.5ML SUSR injection Inject 0.5 mLs into the muscle See Admin Instructions 1 dose now and repeat in 2-6 months Yes Nathan Mo, APRN - CNP   Tetanus-Diphth-Acell Pertussis ([address]) 5-2.5-18.5 LF-MCG/0.5 injection Inject 0.5 mLs into the muscle once for 1 dose Yes Nathan Edgar, APRN - CNP   omeprazole (PRILOSEC) 40 MG delayed release capsule take 1 capsule by mouth once daily if needed for HEARTBURN Yes Philip Sharma MD   sertraline (ZOLOFT) 100 MG tablet take 1 tablet by mouth nightly Yes Philip Sharma MD   levothyroxine (SYNTHROID) 137 MCG tablet take 1 tablet by mouth once daily Yes Philip Sharma MD   furosemide (LASIX) 20 MG tablet take 1 tablet by mouth once daily for 7 days Yes Renee Stover MD   potassium chloride (KLOR-CON M) 10 MEQ extended release tablet Take 1 tablet by mouth daily Yes Renee Stover MD   metroNIDAZOLE (METROCREAM) 0.75 % cream Apply 1 Dose topically 2 times daily Yes AMBREEN Vazquez CNP   Handicap Placard MISC by Does not apply route Start 7/13/2021 and expires 7/12/2026 Yes AMBREEN Vazquez CNP   folic acid (FOLVITE) 1 MG tablet take 1 tablet by mouth once daily Yes AMBREEN Pantoja CNP   Upadacitinib ER 15 MG TB24 Take 15 mg by mouth daily Yes Historical Provider, MD   Cholecalciferol 50 MCG (2000 UT) TABS Take 1 tablet by mouth daily Yes AMBREEN Vazquez CNP   methotrexate (RHEUMATREX) 2.5 MG chemo tablet  Yes Historical Provider, MD   Multiple Vitamins-Minerals (MULTI-VITAMIN GUMMIES PO) Take 2 tablets by mouth daily Yes Historical Provider, MD [place] (Including outside providers/suppliers regularly involved in providing care):   Patient Care Team:  AMBREEN Vazquez CNP as PCP - General (Family Nurse Practitioner)  AMBREEN Vazquez CNP as PCP - [facility] Empaneled Provider     Reviewed and updated this visit:  Tobacco  Allergies  Meds  Problems  Med Hx  Surg Hx  Soc Hx  Fam Hx               Advance Care Planning   Advanced Care Planning: Discussed the patients choices for care and treatment in case of a health event that adversely affects decision-making abilities. Also discussed the patients long-term treatment options. Reviewed with the patient the appropriate state-specific advance directive documents. Reviewed the process of designating a competent adult as an Agent (or -in-fact) that could take make health care decisions for the patient if incompetent.  Patient was asked to complete the declaration forms, either acknowledge the forms by a public notary or an eligible witness and provide a signed copy to the practice office. Time spent (minutes): 5 instructed to bring copy to office     Cardiovascular Disease Risk Counseling: Assessed the patient's risk to develop cardiovascular disease and reviewed main risk factors. Reviewed steps to reduce disease risk including:   Quitting tobacco use, reducing amount smoked, or not starting the habit  Making healthy food choices  Being physically active and gradualy increasing activity levels   Reduce weight and determine a healthy BMI goal  Monitor blood pressure and treat if higher than 140/90 mmHg  Maintain blood total cholesterol levels under 5 mmol/l or 190 mg/dl  Maintain LDL cholesterol levels under 3.0 mmol/l or 115 mg/dl   Control blood glucose levels  Consider taking aspirin (75 mg daily), once blood pressure is controlled   Provided a follow up plan. Time spent (minutes): 10  Obesity Counseling: Assessed behavioral health risks and factors affecting choice of behavior. Suggested weight control approaches, including dietary changes behavioral modification and follow up plan. Provided educational and support documentation.   Time spent (minutes): 15

## 2022-08-18 NOTE — ASSESSMENT & PLAN NOTE
On the basis of positive falls risk screening, assessment and plan is as follows: in-office gait and balance testing performed using The Timed Up and Go Test was positive for increased falls risk, home safety tips provided, referral to PT provided today.

## 2022-08-18 NOTE — ASSESSMENT & PLAN NOTE
.shinglesvacc  · A sore arm with mild or moderate pain is very common after recombinant shingles vaccine, affecting about 80% of vaccinated people. Redness and swelling can also happen at the site of the injection. · Tiredness, muscle pain, headache, shivering, fever, stomach pain, and nausea happen after vaccination in more than half of people who receive recombinant shingles vaccine.

## 2022-09-21 RX ORDER — POTASSIUM CHLORIDE 750 MG/1
TABLET, EXTENDED RELEASE ORAL
Qty: 90 TABLET | Refills: 1 | Status: SHIPPED | OUTPATIENT
Start: 2022-09-21

## 2022-10-04 DIAGNOSIS — F41.9 ANXIETY AND DEPRESSION: Chronic | ICD-10-CM

## 2022-10-04 DIAGNOSIS — E03.8 HYPOTHYROIDISM DUE TO HASHIMOTO'S THYROIDITIS: ICD-10-CM

## 2022-10-04 DIAGNOSIS — E06.3 HYPOTHYROIDISM DUE TO HASHIMOTO'S THYROIDITIS: ICD-10-CM

## 2022-10-04 DIAGNOSIS — K21.9 GASTROESOPHAGEAL REFLUX DISEASE WITHOUT ESOPHAGITIS: Chronic | ICD-10-CM

## 2022-10-04 DIAGNOSIS — F32.A ANXIETY AND DEPRESSION: Chronic | ICD-10-CM

## 2022-10-04 RX ORDER — LEVOTHYROXINE SODIUM 137 UG/1
TABLET ORAL
Qty: 90 TABLET | Refills: 0 | Status: SHIPPED | OUTPATIENT
Start: 2022-10-04

## 2022-10-04 RX ORDER — SERTRALINE HYDROCHLORIDE 100 MG/1
100 TABLET, FILM COATED ORAL NIGHTLY
Qty: 90 TABLET | Refills: 0 | Status: SHIPPED | OUTPATIENT
Start: 2022-10-04

## 2022-10-04 RX ORDER — OMEPRAZOLE 40 MG/1
CAPSULE, DELAYED RELEASE ORAL
Qty: 90 CAPSULE | Refills: 0 | Status: SHIPPED | OUTPATIENT
Start: 2022-10-04

## 2022-10-07 DIAGNOSIS — R30.0 DYSURIA: ICD-10-CM

## 2022-10-07 DIAGNOSIS — R30.0 DYSURIA: Primary | ICD-10-CM

## 2022-10-07 LAB
BACTERIA: ABNORMAL /HPF
BILIRUBIN URINE: NEGATIVE
BLOOD, URINE: ABNORMAL
CLARITY: CLEAR
COLOR: YELLOW
GLUCOSE URINE: NEGATIVE MG/DL
KETONES, URINE: NEGATIVE MG/DL
LEUKOCYTE ESTERASE, URINE: ABNORMAL
MUCUS: PRESENT /LPF
NITRITE, URINE: NEGATIVE
PH UA: 6 (ref 5–9)
PROTEIN UA: ABNORMAL MG/DL
RBC UA: ABNORMAL /HPF (ref 0–2)
SPECIFIC GRAVITY UA: >=1.03 (ref 1–1.03)
UROBILINOGEN, URINE: 0.2 E.U./DL
WBC UA: ABNORMAL /HPF (ref 0–5)

## 2022-10-07 RX ORDER — CIPROFLOXACIN 500 MG/1
500 TABLET, FILM COATED ORAL 2 TIMES DAILY
Qty: 20 TABLET | Refills: 0 | Status: SHIPPED
Start: 2022-10-07 | End: 2022-10-10 | Stop reason: ALTCHOICE

## 2022-10-10 DIAGNOSIS — N39.0 RECURRENT UTI: Primary | ICD-10-CM

## 2022-10-10 LAB
ORGANISM: ABNORMAL
URINE CULTURE, ROUTINE: ABNORMAL

## 2022-10-10 RX ORDER — NITROFURANTOIN 25; 75 MG/1; MG/1
100 CAPSULE ORAL 2 TIMES DAILY
Qty: 20 CAPSULE | Refills: 0 | Status: SHIPPED | OUTPATIENT
Start: 2022-10-10 | End: 2022-10-20

## 2022-10-31 ENCOUNTER — HOSPITAL ENCOUNTER (EMERGENCY)
Age: 67
Discharge: HOME | End: 2022-10-31
Attending: EMERGENCY MEDICINE
Payer: MEDICARE

## 2022-10-31 VITALS
OXYGEN SATURATION: 93 % | RESPIRATION RATE: 16 BRPM | TEMPERATURE: 97.3 F | SYSTOLIC BLOOD PRESSURE: 146 MMHG | DIASTOLIC BLOOD PRESSURE: 71 MMHG | HEART RATE: 82 BPM

## 2022-10-31 DIAGNOSIS — B02.9 HERPES ZOSTER WITHOUT COMPLICATION: Primary | ICD-10-CM

## 2022-10-31 DIAGNOSIS — B37.9 CANDIDIASIS: ICD-10-CM

## 2022-10-31 LAB
ANION GAP SERPL CALCULATED.3IONS-SCNC: 9 MMOL/L (ref 7–16)
BASOPHILS ABSOLUTE: 0 E9/L (ref 0–0.2)
BASOPHILS RELATIVE PERCENT: 0.4 % (ref 0–2)
BUN BLDV-MCNC: 20 MG/DL (ref 6–23)
CALCIUM SERPL-MCNC: 9.5 MG/DL (ref 8.6–10.2)
CHLORIDE BLD-SCNC: 102 MMOL/L (ref 98–107)
CO2: 27 MMOL/L (ref 22–29)
CREAT SERPL-MCNC: 0.8 MG/DL (ref 0.5–1)
EOSINOPHILS ABSOLUTE: 0 E9/L (ref 0.05–0.5)
EOSINOPHILS RELATIVE PERCENT: 0.7 % (ref 0–6)
GFR SERPL CREATININE-BSD FRML MDRD: >60 ML/MIN/1.73
GLUCOSE BLD-MCNC: 114 MG/DL (ref 74–99)
HCT VFR BLD CALC: 39.6 % (ref 34–48)
HEMOGLOBIN: 13.3 G/DL (ref 11.5–15.5)
LYMPHOCYTES ABSOLUTE: 0.61 E9/L (ref 1.5–4)
LYMPHOCYTES RELATIVE PERCENT: 11.3 % (ref 20–42)
MCH RBC QN AUTO: 30 PG (ref 26–35)
MCHC RBC AUTO-ENTMCNC: 33.6 % (ref 32–34.5)
MCV RBC AUTO: 89.2 FL (ref 80–99.9)
MONOCYTES ABSOLUTE: 0.28 E9/L (ref 0.1–0.95)
MONOCYTES RELATIVE PERCENT: 5.2 % (ref 2–12)
NEUTROPHILS ABSOLUTE: 4.62 E9/L (ref 1.8–7.3)
NEUTROPHILS RELATIVE PERCENT: 83.5 % (ref 43–80)
PDW BLD-RTO: 13 FL (ref 11.5–15)
PLATELET # BLD: 205 E9/L (ref 130–450)
PMV BLD AUTO: 9.8 FL (ref 7–12)
POTASSIUM SERPL-SCNC: 4.2 MMOL/L (ref 3.5–5)
RBC # BLD: 4.44 E12/L (ref 3.5–5.5)
SODIUM BLD-SCNC: 138 MMOL/L (ref 132–146)
WBC # BLD: 5.5 E9/L (ref 4.5–11.5)

## 2022-10-31 PROCEDURE — 85025 COMPLETE CBC W/AUTO DIFF WBC: CPT

## 2022-10-31 PROCEDURE — 99283 EMERGENCY DEPT VISIT LOW MDM: CPT

## 2022-10-31 PROCEDURE — 80048 BASIC METABOLIC PNL TOTAL CA: CPT

## 2022-10-31 RX ORDER — ONDANSETRON 4 MG/1
4 TABLET, ORALLY DISINTEGRATING ORAL EVERY 8 HOURS PRN
Qty: 10 TABLET | Refills: 0 | Status: SHIPPED | OUTPATIENT
Start: 2022-10-31

## 2022-10-31 RX ORDER — TRAMADOL HYDROCHLORIDE 50 MG/1
50 TABLET ORAL EVERY 4 HOURS PRN
Qty: 18 TABLET | Refills: 0 | Status: SHIPPED | OUTPATIENT
Start: 2022-10-31 | End: 2022-11-03

## 2022-10-31 RX ORDER — ACYCLOVIR 400 MG/1
800 TABLET ORAL
Qty: 35 TABLET | Refills: 0 | Status: SHIPPED | OUTPATIENT
Start: 2022-10-31 | End: 2022-11-07

## 2022-10-31 RX ORDER — GABAPENTIN 100 MG/1
100 CAPSULE ORAL 2 TIMES DAILY
Qty: 28 CAPSULE | Refills: 0 | Status: SHIPPED | OUTPATIENT
Start: 2022-10-31 | End: 2022-10-31 | Stop reason: SINTOL

## 2022-10-31 RX ORDER — NYSTATIN 100000 U/G
CREAM TOPICAL
Qty: 1 G | Refills: 0 | Status: SHIPPED | OUTPATIENT
Start: 2022-10-31

## 2022-10-31 ASSESSMENT — ENCOUNTER SYMPTOMS
NAUSEA: 1
ABDOMINAL DISTENTION: 0
ABDOMINAL PAIN: 0
SHORTNESS OF BREATH: 0
BACK PAIN: 0
VOMITING: 0
EYES NEGATIVE: 1
DIARRHEA: 0

## 2022-10-31 ASSESSMENT — PAIN DESCRIPTION - ORIENTATION: ORIENTATION: LEFT

## 2022-10-31 ASSESSMENT — PAIN DESCRIPTION - LOCATION: LOCATION: LEG

## 2022-10-31 ASSESSMENT — PAIN SCALES - GENERAL: PAINLEVEL_OUTOF10: 10

## 2022-10-31 ASSESSMENT — PAIN - FUNCTIONAL ASSESSMENT: PAIN_FUNCTIONAL_ASSESSMENT: 0-10

## 2022-10-31 NOTE — ED PROVIDER NOTES
Presents emergency department with complaint of left leg pain. She states it starts in the back and radiates around towards her thigh and down to her knee. She states it is a very uncomfortable pain and hurts with even light touch. She has noticed a small amount of rash on her left buttocks. She also notices a rash that is different underneath her breasts and in her inguinal folds. She states that she had shingles a few years back in the same location as the current rash on her left leg. The history is provided by the patient. Extremity Weakness  Onset quality:  Gradual  Duration:  2 days  Timing:  Constant  Progression:  Worsening  Chronicity:  New  Context: stress    Context: not increased activity and not recent infection    Relieved by:  None tried  Exacerbated by: Palpation of the area. Ineffective treatments:  None tried  Associated symptoms: arthralgias, falls and nausea    Associated symptoms: no abdominal pain, no ataxia, no chest pain, no diarrhea, no dysuria, no fever, no frequency, no headaches, no lethargy, no loss of consciousness, no near-syncope, no shortness of breath, no stroke symptoms and no vomiting      Review of Systems   Constitutional:  Negative for activity change, appetite change, chills and fever. HENT: Negative. Eyes: Negative. Respiratory:  Negative for shortness of breath. Cardiovascular:  Positive for leg swelling (Left leg). Negative for chest pain and near-syncope. Gastrointestinal:  Positive for nausea. Negative for abdominal distention, abdominal pain, diarrhea and vomiting. Genitourinary:  Negative for dysuria and frequency. Musculoskeletal:  Positive for arthralgias and falls. Negative for back pain. Skin:  Positive for rash. Negative for wound. Neurological:  Negative for loss of consciousness, weakness, light-headedness, numbness and headaches. Hematological:  Negative for adenopathy.    All other systems reviewed and are negative. Physical Exam  Vitals and nursing note reviewed. Constitutional:       General: She is not in acute distress. Appearance: Normal appearance. She is well-developed. She is obese. She is not ill-appearing or diaphoretic. HENT:      Head: Normocephalic and atraumatic. Nose: Nose normal.      Mouth/Throat:      Mouth: Mucous membranes are moist.      Pharynx: Oropharynx is clear. Eyes:      Extraocular Movements: Extraocular movements intact. Conjunctiva/sclera: Conjunctivae normal.      Pupils: Pupils are equal, round, and reactive to light. Cardiovascular:      Rate and Rhythm: Normal rate and regular rhythm. Heart sounds: Normal heart sounds. No murmur heard. Pulmonary:      Effort: Pulmonary effort is normal. No respiratory distress. Breath sounds: Normal breath sounds. No wheezing or rales. Abdominal:      General: Bowel sounds are normal.      Palpations: Abdomen is soft. Tenderness: There is no abdominal tenderness. There is no right CVA tenderness, left CVA tenderness, guarding or rebound. Musculoskeletal:         General: Tenderness (Left paraspinous lumbar area with radiation and dermatomal distribution down leg) present. Normal range of motion. Cervical back: Normal range of motion and neck supple. Right lower leg: No edema. Left lower leg: No edema. Skin:     General: Skin is warm and dry. Capillary Refill: Capillary refill takes less than 2 seconds. Findings: Lesion (Erythematous vesicles in clusters appreciated in the lower sacral area.) and rash present. Neurological:      General: No focal deficit present. Mental Status: She is alert and oriented to person, place, and time. Mental status is at baseline. Cranial Nerves: No cranial nerve deficit. Coordination: Coordination normal.   Psychiatric:         Mood and Affect: Mood normal.         Behavior: Behavior normal.         Thought Content:  Thought content normal.         Judgment: Judgment normal.       Procedures    MDM              --------------------------------------------- PAST HISTORY ---------------------------------------------  Past Medical History:  has a past medical history of Anxiety and depression, Bacteremia, Chronic back pain, Congestive heart failure ([facility] Diverticulitis, GERD (gastroesophageal reflux disease), Gram-negative bacteremia, Hypothyroidism, Lumbar spondylosis, Neuropathy, Obesity, Obstructive sleep apnea syndrome, Rheumatoid arthritis involving multiple sites with positive rheumatoid factor ([facility] and Thyroid disease. Past Surgical History:  has a past surgical history that includes Appendectomy; Tonsillectomy; Cholecystectomy, laparoscopic (02/23/2017); other surgical history (04/11/2017); Colonoscopy (09/11/2017); other surgical history (09/12/2017); Nerve Block (Left, 05/05/2020); Nerve Block (Left, 05/05/2020); Anesthesia Nerve Block (Left, 5/5/2020); Nerve Block (Left, 06/04/2020); Pain management procedure (Left, 6/4/2020); Nerve Block (Left, 07/14/2020); and Pain management procedure (Left, 7/14/2020). Social History:  reports that she quit smoking about 35 years ago. Her smoking use included cigarettes. She has never used smokeless tobacco. She reports that she does not drink alcohol and does not use drugs. Family History: family history includes Breast Cancer in her mother; Diabetes in her mother; Heart Disease in her father; Other in her father. The patients home medications have been reviewed.     Allergies: Enbrel [etanercept], Humira [adalimumab], and Penicillins    -------------------------------------------------- RESULTS -------------------------------------------------  Labs:  Results for orders placed or performed during the hospital encounter of 10/31/22   CBC with Auto Differential   Result Value Ref Range    WBC 5.5 4.5 - 11.5 E9/L    RBC 4.44 3.50 - 5.50 E12/L    Hemoglobin 13.3 11.5 - 15.5 g/dL Hematocrit 39.6 34.0 - 48.0 %    MCV 89.2 80.0 - 99.9 fL    MCH 30.0 26.0 - 35.0 pg    MCHC 33.6 32.0 - 34.5 %    RDW 13.0 11.5 - 15.0 fL    Platelets 070 280 - 721 E9/L    MPV 9.8 7.0 - 12.0 fL    Neutrophils % 83.5 (H) 43.0 - 80.0 %    Lymphocytes % 11.3 (L) 20.0 - 42.0 %    Monocytes % 5.2 2.0 - 12.0 %    Eosinophils % 0.7 0.0 - 6.0 %    Basophils % 0.4 0.0 - 2.0 %    Neutrophils Absolute 4.62 1.80 - 7.30 E9/L    Lymphocytes Absolute 0.61 (L) 1.50 - 4.00 E9/L    Monocytes Absolute 0.28 0.10 - 0.95 E9/L    Eosinophils Absolute 0.00 (L) 0.05 - 0.50 E9/L    Basophils Absolute 0.00 0.00 - 0.20 U5/Q   Basic Metabolic Panel   Result Value Ref Range    Sodium 138 132 - 146 mmol/L    Potassium 4.2 3.5 - 5.0 mmol/L    Chloride 102 98 - 107 mmol/L    CO2 27 22 - 29 mmol/L    Anion Gap 9 7 - 16 mmol/L    Glucose 114 (H) 74 - 99 mg/dL    BUN 20 6 - 23 mg/dL    Creatinine 0.8 0.5 - 1.0 mg/dL    Est, Glom Filt Rate >60 >=60 mL/min/1.73    Calcium 9.5 8.6 - 10.2 mg/dL       Radiology:  No orders to display       ------------------------- NURSING NOTES AND VITALS REVIEWED ---------------------------  Date / Time Roomed:  10/31/2022  1:28 PM  ED Bed Assignment:  26/26    The nursing notes within the ED encounter and vital signs as below have been reviewed. BP (!) 146/71   Pulse 82   Temp 97.3 °F (36.3 °C) (Infrared)   Resp 16   SpO2 93%   Oxygen Saturation Interpretation: Normal      ------------------------------------------ PROGRESS NOTES ------------------------------------------  I have spoken with the patient and discussed todays results, in addition to providing specific details for the plan of care and counseling regarding the diagnosis and prognosis. Their questions are answered at this time and they are agreeable with the plan. I discussed at length with them reasons for immediate return here for re evaluation.  They will followup with primary care by calling their office tomorrow. --------------------------------- ADDITIONAL PROVIDER NOTES ---------------------------------  At this time the patient is without objective evidence of an acute process requiring hospitalization or inpatient management. They have remained hemodynamically stable throughout their entire ED visit and are stable for discharge with outpatient follow-up. The plan has been discussed in detail and they are aware of the specific conditions for emergent return, as well as the importance of follow-up. New Prescriptions    ACYCLOVIR (ZOVIRAX) 400 MG TABLET    Take 2 tablets by mouth 5 times daily for 7 days    NYSTATIN (MYCOSTATIN) 257561 UNIT/GM CREAM    Apply topically 2 times daily. ONDANSETRON (ZOFRAN ODT) 4 MG DISINTEGRATING TABLET    Take 1 tablet by mouth every 8 hours as needed for Nausea or Vomiting    TRAMADOL (ULTRAM) 50 MG TABLET    Take 1 tablet by mouth every 4 hours as needed for Pain for up to 3 days. Intended supply: 3 days. Take lowest dose possible to manage pain       Diagnosis:  1. Herpes zoster without complication    2. Candidiasis        Disposition:  Patient's disposition: Discharge to home  Patient's condition is stable.          Sandee Rodriguez DO  10/31/22 1527

## 2022-11-15 ENCOUNTER — OFFICE VISIT (OUTPATIENT)
Dept: FAMILY MEDICINE CLINIC | Age: 67
End: 2022-11-15
Payer: MEDICARE

## 2022-11-15 VITALS
SYSTOLIC BLOOD PRESSURE: 128 MMHG | TEMPERATURE: 97.9 F | BODY MASS INDEX: 47.6 KG/M2 | DIASTOLIC BLOOD PRESSURE: 80 MMHG | HEART RATE: 69 BPM | OXYGEN SATURATION: 99 % | RESPIRATION RATE: 16 BRPM | HEIGHT: 72 IN

## 2022-11-15 DIAGNOSIS — R11.0 NAUSEA: ICD-10-CM

## 2022-11-15 DIAGNOSIS — B37.89 CANDIDIASIS OF BREAST: Primary | ICD-10-CM

## 2022-11-15 DIAGNOSIS — I50.9 CONGESTIVE HEART FAILURE, UNSPECIFIED HF CHRONICITY, UNSPECIFIED HEART FAILURE TYPE (HCC): ICD-10-CM

## 2022-11-15 DIAGNOSIS — M05.79 RHEUMATOID ARTHRITIS INVOLVING MULTIPLE SITES WITH POSITIVE RHEUMATOID FACTOR (HCC): ICD-10-CM

## 2022-11-15 PROCEDURE — 1123F ACP DISCUSS/DSCN MKR DOCD: CPT | Performed by: NURSE PRACTITIONER

## 2022-11-15 PROCEDURE — 99214 OFFICE O/P EST MOD 30 MIN: CPT | Performed by: NURSE PRACTITIONER

## 2022-11-15 ASSESSMENT — ENCOUNTER SYMPTOMS
ABDOMINAL PAIN: 0
SHORTNESS OF BREATH: 1
BACK PAIN: 1
NAUSEA: 1
VOMITING: 0
CONSTIPATION: 0
DIARRHEA: 0
COLOR CHANGE: 1
COUGH: 0
WHEEZING: 0

## 2022-11-15 NOTE — ASSESSMENT & PLAN NOTE
New problem worsening referral to Dermatology 11/17/22 9 am. Wash with dial soap rinse well and pat dry, use hairdryer on lowest setting to dry skin.  Use a moisture barrier

## 2022-11-15 NOTE — PROGRESS NOTES
OFFICE PROGRESS NOTE  [address]  [address] [id number]  Dept: [phone]   Chief Complaint   Patient presents with    Rash     Under breast       ASSESSMENT/PLAN   1. Candidiasis of breast  Assessment & Plan:   New problem worsening referral to Dermatology 11/17/22 9 am. Wash with dial soap rinse well and pat dry, use hairdryer on lowest setting to dry skin. Use a moisture barrier  Orders:  -     Amb External Referral To Dermatology  2. Body mass index (BMI) 45.0-49.9, adult [facility])  Assessment & Plan:   Slowly improving continue to work on weight loss, daily walking  3. Nausea  Assessment & Plan:   New problem declines referral to GI today. 4. Rheumatoid arthritis involving multiple sites with positive rheumatoid factor ([facility]  Assessment & Plan:   Monitored by specialist- no acute findings meriting change in the plan    5. Congestive heart failure, unspecified HF chronicity, unspecified heart failure type [facility])  Assessment & Plan:   Monitored by specialist- no acute findings meriting change in the plan       Reviewed labs: BMP, CBCD,   Reviewed notes from ER notes    Discussed weight loss, Discussed exercising 30 minutes daily, and Discussed taking medications as directed and adverse effects    Return for keep follow up appt. HPI:     She had shingles in October was seen in the ER 10/31/22 she was given acyclovir, zofran for the nausea, had a rash under her breast candida but the mycostatin didn't help. She was also given tramadol which she hasn't been taking. She has a rash under her breast when the shingles start, she thinks it may be better but can't say for sure. She was given mycostatin to use 2 times a day for 2 - 3 days. She tried using clotrimazole but didn't help either, She also got keralyt 5 cream started on Sunday. She is complaining of nausea every morning since she had the shingles, she says the zofran does help.  She feels the best around 10 pm at night. Feels good when she first wakes up but once she sits up she doesn't feel well. She has continued to take her Rinvoq for her RA and never called the Rheumatologist office to see if she should stay on it. RA has been stable. CHF has been stable and no issues since earlier this year. Current Outpatient Medications:     ondansetron (ZOFRAN ODT) 4 MG disintegrating tablet, Take 1 tablet by mouth every 8 hours as needed for Nausea or Vomiting, Disp: 10 tablet, Rfl: 0    omeprazole (PRILOSEC) 40 MG delayed release capsule, take 1 capsule by mouth once daily if needed for HEARTBURN, Disp: 90 capsule, Rfl: 0    sertraline (ZOLOFT) 100 MG tablet, take 1 tablet by mouth nightly, Disp: 90 tablet, Rfl: 0    levothyroxine (SYNTHROID) 137 MCG tablet, take 1 tablet by mouth once daily, Disp: 90 tablet, Rfl: 0    potassium chloride (KLOR-CON M) 10 MEQ extended release tablet, take 1 tablet by mouth once daily, Disp: 90 tablet, Rfl: 1    Handicap Placard MISC, by Does not apply route Start 7/13/2021 and expires 7/12/2026, Disp: 1 each, Rfl: 0    folic acid (FOLVITE) 1 MG tablet, take 1 tablet by mouth once daily, Disp: 30 tablet, Rfl: 6    Upadacitinib ER 15 MG TB24, Take 15 mg by mouth daily, Disp: , Rfl:     Cholecalciferol 50 MCG (2000 UT) TABS, Take 1 tablet by mouth daily, Disp: 30 tablet, Rfl: 6    methotrexate (RHEUMATREX) 2.5 MG chemo tablet, Indications: Take 8 tablets weekly, Disp: , Rfl:     Multiple Vitamins-Minerals (MULTI-VITAMIN GUMMIES PO), Take 2 tablets by mouth daily, Disp: , Rfl:     nystatin (MYCOSTATIN) 629941 UNIT/GM cream, Apply topically 2 times daily.  (Patient not taking: Reported on 11/15/2022), Disp: 1 g, Rfl: 0    zoster recombinant adjuvanted vaccine (SHINGRIX) 50 MCG/0.5ML SUSR injection, Inject 0.5 mLs into the muscle See Admin Instructions 1 dose now and repeat in 2-6 months, Disp: 0.5 mL, Rfl: 1    furosemide (LASIX) 20 MG tablet, take 1 tablet by mouth once daily for 7 days (Patient not taking: Reported on 11/15/2022), Disp: 90 tablet, Rfl: 1    metroNIDAZOLE (METROCREAM) 0.75 % cream, Apply 1 Dose topically 2 times daily (Patient not taking: Reported on 11/15/2022), Disp: 60 g, Rfl: 3      Surgical History:  has a past surgical history that includes Appendectomy; Tonsillectomy; Cholecystectomy, laparoscopic (02/23/2017); other surgical history (04/11/2017); Colonoscopy (09/11/2017); other surgical history (09/12/2017); Nerve Block (Left, 05/05/2020); Nerve Block (Left, 05/05/2020); Anesthesia Nerve Block (Left, 5/5/2020); Nerve Block (Left, 06/04/2020); Pain management procedure (Left, 6/4/2020); Nerve Block (Left, 07/14/2020); and Pain management procedure (Left, 7/14/2020). Social History:  reports that she quit smoking about 35 years ago. Her smoking use included cigarettes. She has never used smokeless tobacco. She reports that she does not drink alcohol and does not use drugs. Family History: family history includes Breast Cancer in her mother; Diabetes in her mother; Heart Disease in her father; Other in her father. I have reviewed Rosalie's allergies, medications, problem list, medical, social and family history and have updated as needed in the electronic medical record    Review of Systems   Constitutional:  Positive for activity change and fatigue. Negative for appetite change, chills, diaphoresis, fever and unexpected weight change. Respiratory:  Positive for shortness of breath. Negative for cough and wheezing. Cardiovascular:  Negative for chest pain, palpitations and leg swelling. Gastrointestinal:  Positive for nausea. Negative for abdominal pain, constipation, diarrhea and vomiting. Musculoskeletal:  Positive for arthralgias, back pain and gait problem. Skin:  Positive for color change and rash.      OBJECTIVE:     VS:  Wt Readings from Last 3 Encounters:   08/18/22 (!) 351 lb (159.2 kg)   07/15/22 (!) 350 lb (158.8 kg)   06/28/22 (!) 369 lb (167.4 kg)                       Vitals:    11/15/22 1326   BP: 128/80   Pulse: 69   Resp: 16   Temp: 97.9 °F (36.6 °C)   SpO2: 99%   Height: 6' (1.829 m)       General: Alert and oriented to person, place, and time, well developed and well nourished, in no acute distress  SKIN: Warm and dry, intact with very red, violaceous rash with several raised areas. Not moist, no drainage. HEAD: normocephalic, atraumatic  Neck: supple and non-tender without mass, trachea midline, no cervical lymphadenopathy, no bruit, no thyromegaly or nodules  Cardiovascular: regular rate and regular rhythm, normal S1 and S2,  no murmurs, rubs, clicks, or gallop. Distal pulses intact, no carotid bruits. No edema  Pulmonary/Chest: clear to auscultation bilaterally, no wheezes, rales or rhonchi, normal air movement, no respiratory distress  Abdomen: soft, non-tender, non-distended, normal bowel sounds, no masses or hepatosplenomegaly  Neurologic: antalgic gait sitting in WC, coordination and speech normal  Extremities: no clubbing, cyanosis, or edema. Psychiatric: Good eye contact, normal mood and affect, answers questions appropriately    I have reviewed my findings and recommendations with Tavares Regan.     Luis F Faulkner, AMBREEN - CNP, NP-C, FNP-BC

## 2022-12-19 RX ORDER — FUROSEMIDE 20 MG/1
TABLET ORAL
Qty: 90 TABLET | Refills: 1 | Status: SHIPPED | OUTPATIENT
Start: 2022-12-19

## 2022-12-31 DIAGNOSIS — F41.9 ANXIETY AND DEPRESSION: Chronic | ICD-10-CM

## 2022-12-31 DIAGNOSIS — F32.A ANXIETY AND DEPRESSION: Chronic | ICD-10-CM

## 2022-12-31 DIAGNOSIS — E03.8 HYPOTHYROIDISM DUE TO HASHIMOTO'S THYROIDITIS: ICD-10-CM

## 2022-12-31 DIAGNOSIS — E06.3 HYPOTHYROIDISM DUE TO HASHIMOTO'S THYROIDITIS: ICD-10-CM

## 2022-12-31 DIAGNOSIS — K21.9 GASTROESOPHAGEAL REFLUX DISEASE WITHOUT ESOPHAGITIS: Chronic | ICD-10-CM

## 2023-01-03 RX ORDER — OMEPRAZOLE 40 MG/1
CAPSULE, DELAYED RELEASE ORAL
Qty: 90 CAPSULE | Refills: 0 | Status: SHIPPED | OUTPATIENT
Start: 2023-01-03

## 2023-01-03 RX ORDER — SERTRALINE HYDROCHLORIDE 100 MG/1
100 TABLET, FILM COATED ORAL NIGHTLY
Qty: 90 TABLET | Refills: 0 | Status: SHIPPED | OUTPATIENT
Start: 2023-01-03

## 2023-01-03 RX ORDER — LEVOTHYROXINE SODIUM 137 UG/1
TABLET ORAL
Qty: 90 TABLET | Refills: 0 | Status: SHIPPED | OUTPATIENT
Start: 2023-01-03

## 2023-03-29 DIAGNOSIS — E06.3 HYPOTHYROIDISM DUE TO HASHIMOTO'S THYROIDITIS: ICD-10-CM

## 2023-03-29 DIAGNOSIS — F41.9 ANXIETY AND DEPRESSION: Chronic | ICD-10-CM

## 2023-03-29 DIAGNOSIS — K21.9 GASTROESOPHAGEAL REFLUX DISEASE WITHOUT ESOPHAGITIS: Chronic | ICD-10-CM

## 2023-03-29 DIAGNOSIS — F32.A ANXIETY AND DEPRESSION: Chronic | ICD-10-CM

## 2023-03-29 DIAGNOSIS — E03.8 HYPOTHYROIDISM DUE TO HASHIMOTO'S THYROIDITIS: ICD-10-CM

## 2023-03-29 RX ORDER — OMEPRAZOLE 40 MG/1
CAPSULE, DELAYED RELEASE ORAL
Qty: 90 CAPSULE | Refills: 0 | OUTPATIENT
Start: 2023-03-29

## 2023-03-29 RX ORDER — LEVOTHYROXINE SODIUM 137 UG/1
TABLET ORAL
Qty: 90 TABLET | Refills: 0 | OUTPATIENT
Start: 2023-03-29

## 2023-03-29 RX ORDER — SERTRALINE HYDROCHLORIDE 100 MG/1
TABLET, FILM COATED ORAL
Qty: 90 TABLET | Refills: 0 | OUTPATIENT
Start: 2023-03-29

## 2023-05-23 ENCOUNTER — OFFICE VISIT (OUTPATIENT)
Dept: FAMILY MEDICINE CLINIC | Age: 68
End: 2023-05-23
Payer: MEDICARE

## 2023-05-23 VITALS
WEIGHT: 293 LBS | HEIGHT: 72 IN | RESPIRATION RATE: 18 BRPM | BODY MASS INDEX: 39.68 KG/M2 | SYSTOLIC BLOOD PRESSURE: 126 MMHG | OXYGEN SATURATION: 91 % | DIASTOLIC BLOOD PRESSURE: 78 MMHG | TEMPERATURE: 97.9 F | HEART RATE: 69 BPM

## 2023-05-23 DIAGNOSIS — K21.9 GASTROESOPHAGEAL REFLUX DISEASE WITHOUT ESOPHAGITIS: Chronic | ICD-10-CM

## 2023-05-23 DIAGNOSIS — M05.79 RHEUMATOID ARTHRITIS INVOLVING MULTIPLE SITES WITH POSITIVE RHEUMATOID FACTOR (HCC): ICD-10-CM

## 2023-05-23 DIAGNOSIS — F32.A ANXIETY AND DEPRESSION: Chronic | ICD-10-CM

## 2023-05-23 DIAGNOSIS — E03.8 HYPOTHYROIDISM DUE TO HASHIMOTO'S THYROIDITIS: Primary | ICD-10-CM

## 2023-05-23 DIAGNOSIS — F41.9 ANXIETY AND DEPRESSION: Chronic | ICD-10-CM

## 2023-05-23 DIAGNOSIS — E06.3 HYPOTHYROIDISM DUE TO HASHIMOTO'S THYROIDITIS: ICD-10-CM

## 2023-05-23 DIAGNOSIS — E06.3 HYPOTHYROIDISM DUE TO HASHIMOTO'S THYROIDITIS: Primary | ICD-10-CM

## 2023-05-23 DIAGNOSIS — R73.01 IMPAIRED FASTING BLOOD SUGAR: ICD-10-CM

## 2023-05-23 DIAGNOSIS — E03.8 HYPOTHYROIDISM DUE TO HASHIMOTO'S THYROIDITIS: ICD-10-CM

## 2023-05-23 DIAGNOSIS — Z12.31 SCREENING MAMMOGRAM FOR BREAST CANCER: ICD-10-CM

## 2023-05-23 PROBLEM — I50.9 CONGESTIVE HEART FAILURE (HCC): Status: RESOLVED | Noted: 2021-11-16 | Resolved: 2023-05-23

## 2023-05-23 LAB
ALBUMIN SERPL-MCNC: 4.9 G/DL (ref 3.5–5.2)
ALP SERPL-CCNC: 119 U/L (ref 35–104)
ALT SERPL-CCNC: 46 U/L (ref 0–32)
ANION GAP SERPL CALCULATED.3IONS-SCNC: 11 MMOL/L (ref 7–16)
AST SERPL-CCNC: 43 U/L (ref 0–31)
BASOPHILS # BLD: 0 E9/L (ref 0–0.2)
BASOPHILS NFR BLD: 0.4 % (ref 0–2)
BILIRUB SERPL-MCNC: 0.7 MG/DL (ref 0–1.2)
BUN SERPL-MCNC: 18 MG/DL (ref 6–23)
CALCIUM SERPL-MCNC: 9.6 MG/DL (ref 8.6–10.2)
CHLORIDE SERPL-SCNC: 106 MMOL/L (ref 98–107)
CO2 SERPL-SCNC: 27 MMOL/L (ref 22–29)
CREAT SERPL-MCNC: 0.7 MG/DL (ref 0.5–1)
EOSINOPHIL # BLD: 0.06 E9/L (ref 0.05–0.5)
EOSINOPHIL NFR BLD: 0.9 % (ref 0–6)
ERYTHROCYTE [DISTWIDTH] IN BLOOD BY AUTOMATED COUNT: 13.8 FL (ref 11.5–15)
GLUCOSE SERPL-MCNC: 105 MG/DL (ref 74–99)
HCT VFR BLD AUTO: 43.4 % (ref 34–48)
HGB BLD-MCNC: 14.3 G/DL (ref 11.5–15.5)
LYMPHOCYTES # BLD: 0.83 E9/L (ref 1.5–4)
LYMPHOCYTES NFR BLD: 12.1 % (ref 20–42)
MCH RBC QN AUTO: 29.8 PG (ref 26–35)
MCHC RBC AUTO-ENTMCNC: 32.9 % (ref 32–34.5)
MCV RBC AUTO: 90.4 FL (ref 80–99.9)
MONOCYTES # BLD: 1.04 E9/L (ref 0.1–0.95)
MONOCYTES NFR BLD: 14.6 % (ref 2–12)
NEUTROPHILS # BLD: 4.97 E9/L (ref 1.8–7.3)
NEUTS SEG NFR BLD: 72.4 % (ref 43–80)
NRBC BLD-RTO: 0.9 /100 WBC
OVALOCYTES: ABNORMAL
PLATELET # BLD AUTO: 232 E9/L (ref 130–450)
PMV BLD AUTO: 10.4 FL (ref 7–12)
POIKILOCYTES: ABNORMAL
POTASSIUM SERPL-SCNC: 4.2 MMOL/L (ref 3.5–5)
PROT SERPL-MCNC: 7.9 G/DL (ref 6.4–8.3)
RBC # BLD AUTO: 4.8 E12/L (ref 3.5–5.5)
SODIUM SERPL-SCNC: 144 MMOL/L (ref 132–146)
TEAR DROP CELLS: ABNORMAL
TSH SERPL-MCNC: 2.71 UIU/ML (ref 0.27–4.2)
WBC # BLD: 6.9 E9/L (ref 4.5–11.5)

## 2023-05-23 PROCEDURE — 1123F ACP DISCUSS/DSCN MKR DOCD: CPT | Performed by: NURSE PRACTITIONER

## 2023-05-23 PROCEDURE — 99214 OFFICE O/P EST MOD 30 MIN: CPT | Performed by: NURSE PRACTITIONER

## 2023-05-23 ASSESSMENT — ENCOUNTER SYMPTOMS
COUGH: 0
VOMITING: 0
SORE THROAT: 0
NAUSEA: 0
WHEEZING: 0
CONSTIPATION: 0
SINUS PAIN: 0
SHORTNESS OF BREATH: 0
COLOR CHANGE: 0
BACK PAIN: 1
CHEST TIGHTNESS: 0
TROUBLE SWALLOWING: 0
ABDOMINAL PAIN: 0
VOICE CHANGE: 0
DIARRHEA: 0
SINUS PRESSURE: 0
RHINORRHEA: 0
FACIAL SWELLING: 0

## 2023-05-23 NOTE — ASSESSMENT & PLAN NOTE
asymptomatic, no significant side effects from medication noted. Continue Levothyroxine 137 mcg daily, labs reviewed:CMP, CBCD, Lipids, TSH, FT4, Repeat labs in today. Discussed taking same time every day on empty stomach, Wait 30 - 60 minutes to have breakfast or other medications. No PPI's or acid blockers, vitamins, minerals, calcium or iron for 4 hours after taking thyroid medication.

## 2023-05-23 NOTE — ASSESSMENT & PLAN NOTE
Stable  Continue omeprazole as needed  -Discussed elevated the HOB 30 degrees  -Discussed limiting spicy, fatty, greasy foods  -Discussed limit caffeine consumption to 1 - 2 cups daily  -Discussed waiting at least 3 - 4 hours before going to bed after eating  -Discussed not to eat and bend over immediately or lift heavy items.  -Discussed weight reduction if needed even 5 - 10 pounds.  -Discussed taking medications 1 hour prior to eating.   -Stay well hydrated

## 2023-05-23 NOTE — PROGRESS NOTES
OFFICE PROGRESS NOTE  [address] [place] [address] Rd  [address] [id number]  Dept: [phone]   Chief Complaint   Patient presents with    Medication Refill    Hypothyroidism    Gastroesophageal Reflux    Anxiety       ASSESSMENT/PLAN   1. Hypothyroidism due to Hashimoto's thyroiditis  Assessment & Plan:   asymptomatic, no significant side effects from medication noted. Continue Levothyroxine 137 mcg daily, labs reviewed:CMP, CBCD, Lipids, TSH, FT4, Repeat labs in today. Discussed taking same time every day on empty stomach, Wait 30 - 60 minutes to have breakfast or other medications. No PPI's or acid blockers, vitamins, minerals, calcium or iron for 4 hours after taking thyroid medication. Orders:  -     levothyroxine (SYNTHROID) 137 MCG tablet; Take 1 tablet by mouth daily, Disp-90 tablet, R-1Normal  -     CBC with Auto Differential; Future  -     Comprehensive Metabolic Panel; Future  -     TSH; Future  -     T4, Free; Future  2. Anxiety and depression  Assessment & Plan:   Stable on the sertraline 100 mg daily labs today  Orders:  -     sertraline (ZOLOFT) 100 MG tablet; Take 1 tablet by mouth nightly, Disp-90 tablet, R-1Normal  3.  Gastroesophageal reflux disease without esophagitis  Assessment & Plan:   Stable  Continue omeprazole as needed  -Discussed elevated the HOB 30 degrees  -Discussed limiting spicy, fatty, greasy foods  -Discussed limit caffeine consumption to 1 - 2 cups daily  -Discussed waiting at least 3 - 4 hours before going to bed after eating  -Discussed not to eat and bend over immediately or lift heavy items.  -Discussed weight reduction if needed even 5 - 10 pounds.  -Discussed taking medications 1 hour prior to eating.   -Stay well hydrated        Orders:  -     omeprazole (PRILOSEC) 40 MG delayed release capsule; take 1 capsule by mouth once daily if needed for HEARTBURN, Disp-90 capsule, R-1Note increase in doseNormal  -     CBC with

## 2023-05-23 NOTE — ASSESSMENT & PLAN NOTE
Monitored by specialist- no acute findings meriting change in the plan she does have upcoming appt in July 2023

## 2023-05-24 DIAGNOSIS — R74.01 ELEVATED TRANSAMINASE LEVEL: ICD-10-CM

## 2023-05-24 DIAGNOSIS — R73.01 IMPAIRED FASTING GLUCOSE: ICD-10-CM

## 2023-05-24 DIAGNOSIS — R79.89 ABNORMAL CBC MEASUREMENT: Primary | ICD-10-CM

## 2023-05-24 DIAGNOSIS — B37.89 CANDIDIASIS OF BREAST: ICD-10-CM

## 2023-05-24 RX ORDER — SERTRALINE HYDROCHLORIDE 100 MG/1
100 TABLET, FILM COATED ORAL NIGHTLY
Qty: 90 TABLET | Refills: 1 | Status: SHIPPED | OUTPATIENT
Start: 2023-05-24

## 2023-05-24 RX ORDER — OMEPRAZOLE 40 MG/1
CAPSULE, DELAYED RELEASE ORAL
Qty: 90 CAPSULE | Refills: 1 | Status: SHIPPED | OUTPATIENT
Start: 2023-05-24

## 2023-05-24 RX ORDER — LEVOTHYROXINE SODIUM 137 UG/1
137 TABLET ORAL DAILY
Qty: 90 TABLET | Refills: 1 | Status: SHIPPED | OUTPATIENT
Start: 2023-05-24

## 2023-05-24 RX ORDER — NYSTATIN 100000 [USP'U]/G
POWDER TOPICAL
Qty: 60 G | Refills: 3 | Status: SHIPPED | OUTPATIENT
Start: 2023-05-24

## 2023-05-25 LAB — T4 FREE SERPL-MCNC: 1.72 NG/DL (ref 0.93–1.7)

## 2023-10-19 ENCOUNTER — HOSPITAL ENCOUNTER (OUTPATIENT)
Dept: MAMMOGRAPHY | Age: 68
Discharge: HOME | End: 2023-10-21
Payer: MEDICARE

## 2023-10-19 DIAGNOSIS — Z12.31 SCREENING MAMMOGRAM FOR BREAST CANCER: ICD-10-CM

## 2023-10-19 PROCEDURE — 77063 BREAST TOMOSYNTHESIS BI: CPT

## 2023-10-31 RX ORDER — FOLIC ACID 1 MG/1
TABLET ORAL
Qty: 30 TABLET | Refills: 6 | Status: SHIPPED | OUTPATIENT
Start: 2023-10-31

## 2023-11-12 DIAGNOSIS — F41.9 ANXIETY AND DEPRESSION: Chronic | ICD-10-CM

## 2023-11-12 DIAGNOSIS — F32.A ANXIETY AND DEPRESSION: Chronic | ICD-10-CM

## 2023-11-12 DIAGNOSIS — E03.8 HYPOTHYROIDISM DUE TO HASHIMOTO'S THYROIDITIS: ICD-10-CM

## 2023-11-12 DIAGNOSIS — E06.3 HYPOTHYROIDISM DUE TO HASHIMOTO'S THYROIDITIS: ICD-10-CM

## 2023-11-12 DIAGNOSIS — K21.9 GASTROESOPHAGEAL REFLUX DISEASE WITHOUT ESOPHAGITIS: Chronic | ICD-10-CM

## 2023-11-13 RX ORDER — LEVOTHYROXINE SODIUM 137 UG/1
137 TABLET ORAL DAILY
Qty: 90 TABLET | Refills: 1 | OUTPATIENT
Start: 2023-11-13

## 2023-11-13 RX ORDER — SERTRALINE HYDROCHLORIDE 100 MG/1
100 TABLET, FILM COATED ORAL NIGHTLY
Qty: 90 TABLET | Refills: 1 | OUTPATIENT
Start: 2023-11-13

## 2023-11-13 RX ORDER — OMEPRAZOLE 40 MG/1
CAPSULE, DELAYED RELEASE ORAL
Qty: 90 CAPSULE | Refills: 1 | OUTPATIENT
Start: 2023-11-13

## 2023-11-25 ASSESSMENT — PATIENT HEALTH QUESTIONNAIRE - PHQ9
6. FEELING BAD ABOUT YOURSELF - OR THAT YOU ARE A FAILURE OR HAVE LET YOURSELF OR YOUR FAMILY DOWN: 0
3. TROUBLE FALLING OR STAYING ASLEEP: 0
9. THOUGHTS THAT YOU WOULD BE BETTER OFF DEAD, OR OF HURTING YOURSELF: 0
8. MOVING OR SPEAKING SO SLOWLY THAT OTHER PEOPLE COULD HAVE NOTICED. OR THE OPPOSITE, BEING SO FIGETY OR RESTLESS THAT YOU HAVE BEEN MOVING AROUND A LOT MORE THAN USUAL: 0
2. FEELING DOWN, DEPRESSED OR HOPELESS: 0
SUM OF ALL RESPONSES TO PHQ QUESTIONS 1-9: 0
1. LITTLE INTEREST OR PLEASURE IN DOING THINGS: 0
SUM OF ALL RESPONSES TO PHQ QUESTIONS 1-9: 0
5. POOR APPETITE OR OVEREATING: 0
4. FEELING TIRED OR HAVING LITTLE ENERGY: 0
SUM OF ALL RESPONSES TO PHQ QUESTIONS 1-9: 0
SUM OF ALL RESPONSES TO PHQ QUESTIONS 1-9: 0
7. TROUBLE CONCENTRATING ON THINGS, SUCH AS READING THE NEWSPAPER OR WATCHING TELEVISION: 0
10. IF YOU CHECKED OFF ANY PROBLEMS, HOW DIFFICULT HAVE THESE PROBLEMS MADE IT FOR YOU TO DO YOUR WORK, TAKE CARE OF THINGS AT HOME, OR GET ALONG WITH OTHER PEOPLE: 0
SUM OF ALL RESPONSES TO PHQ9 QUESTIONS 1 & 2: 0

## 2023-11-27 DIAGNOSIS — E06.3 HYPOTHYROIDISM DUE TO HASHIMOTO'S THYROIDITIS: ICD-10-CM

## 2023-11-27 DIAGNOSIS — F32.A ANXIETY AND DEPRESSION: Chronic | ICD-10-CM

## 2023-11-27 DIAGNOSIS — F41.9 ANXIETY AND DEPRESSION: Chronic | ICD-10-CM

## 2023-11-27 DIAGNOSIS — B37.89 CANDIDIASIS OF BREAST: ICD-10-CM

## 2023-11-27 DIAGNOSIS — E03.8 HYPOTHYROIDISM DUE TO HASHIMOTO'S THYROIDITIS: ICD-10-CM

## 2023-11-27 RX ORDER — SERTRALINE HYDROCHLORIDE 100 MG/1
100 TABLET, FILM COATED ORAL NIGHTLY
Qty: 90 TABLET | Refills: 0 | Status: SHIPPED
Start: 2023-11-27 | End: 2023-12-15 | Stop reason: SDUPTHER

## 2023-11-27 RX ORDER — LEVOTHYROXINE SODIUM 137 UG/1
137 TABLET ORAL DAILY
Qty: 90 TABLET | Refills: 0 | Status: SHIPPED
Start: 2023-11-27 | End: 2023-12-15 | Stop reason: SDUPTHER

## 2023-11-27 RX ORDER — NYSTATIN 100000 [USP'U]/G
POWDER TOPICAL
Qty: 60 G | Refills: 3 | Status: SHIPPED | OUTPATIENT
Start: 2023-11-27

## 2023-12-12 ASSESSMENT — PATIENT HEALTH QUESTIONNAIRE - PHQ9
6. FEELING BAD ABOUT YOURSELF - OR THAT YOU ARE A FAILURE OR HAVE LET YOURSELF OR YOUR FAMILY DOWN: 0
8. MOVING OR SPEAKING SO SLOWLY THAT OTHER PEOPLE COULD HAVE NOTICED. OR THE OPPOSITE - BEING SO FIDGETY OR RESTLESS THAT YOU HAVE BEEN MOVING AROUND A LOT MORE THAN USUAL: NOT AT ALL
3. TROUBLE FALLING OR STAYING ASLEEP: NOT AT ALL
1. LITTLE INTEREST OR PLEASURE IN DOING THINGS: 0
SUM OF ALL RESPONSES TO PHQ QUESTIONS 1-9: 0
7. TROUBLE CONCENTRATING ON THINGS, SUCH AS READING THE NEWSPAPER OR WATCHING TELEVISION: NOT AT ALL
10. IF YOU CHECKED OFF ANY PROBLEMS, HOW DIFFICULT HAVE THESE PROBLEMS MADE IT FOR YOU TO DO YOUR WORK, TAKE CARE OF THINGS AT HOME, OR GET ALONG WITH OTHER PEOPLE: 0
8. MOVING OR SPEAKING SO SLOWLY THAT OTHER PEOPLE COULD HAVE NOTICED. OR THE OPPOSITE, BEING SO FIGETY OR RESTLESS THAT YOU HAVE BEEN MOVING AROUND A LOT MORE THAN USUAL: 0
SUM OF ALL RESPONSES TO PHQ QUESTIONS 1-9: 0
SUM OF ALL RESPONSES TO PHQ QUESTIONS 1-9: 0
7. TROUBLE CONCENTRATING ON THINGS, SUCH AS READING THE NEWSPAPER OR WATCHING TELEVISION: 0
5. POOR APPETITE OR OVEREATING: 0
9. THOUGHTS THAT YOU WOULD BE BETTER OFF DEAD, OR OF HURTING YOURSELF: NOT AT ALL
10. IF YOU CHECKED OFF ANY PROBLEMS, HOW DIFFICULT HAVE THESE PROBLEMS MADE IT FOR YOU TO DO YOUR WORK, TAKE CARE OF THINGS AT HOME, OR GET ALONG WITH OTHER PEOPLE: NOT DIFFICULT AT ALL
2. FEELING DOWN, DEPRESSED OR HOPELESS: NOT AT ALL
3. TROUBLE FALLING OR STAYING ASLEEP: 0
4. FEELING TIRED OR HAVING LITTLE ENERGY: 0
5. POOR APPETITE OR OVEREATING: NOT AT ALL
4. FEELING TIRED OR HAVING LITTLE ENERGY: NOT AT ALL
9. THOUGHTS THAT YOU WOULD BE BETTER OFF DEAD, OR OF HURTING YOURSELF: 0
SUM OF ALL RESPONSES TO PHQ QUESTIONS 1-9: 0
2. FEELING DOWN, DEPRESSED OR HOPELESS: 0
1. LITTLE INTEREST OR PLEASURE IN DOING THINGS: NOT AT ALL
SUM OF ALL RESPONSES TO PHQ9 QUESTIONS 1 & 2: 0
6. FEELING BAD ABOUT YOURSELF - OR THAT YOU ARE A FAILURE OR HAVE LET YOURSELF OR YOUR FAMILY DOWN: NOT AT ALL
SUM OF ALL RESPONSES TO PHQ QUESTIONS 1-9: 0

## 2023-12-14 ENCOUNTER — OFFICE VISIT (OUTPATIENT)
Dept: FAMILY MEDICINE CLINIC | Age: 68
End: 2023-12-14
Payer: COMMERCIAL

## 2023-12-14 VITALS
HEART RATE: 67 BPM | HEIGHT: 72 IN | RESPIRATION RATE: 16 BRPM | TEMPERATURE: 97.4 F | BODY MASS INDEX: 39.68 KG/M2 | OXYGEN SATURATION: 98 % | SYSTOLIC BLOOD PRESSURE: 118 MMHG | DIASTOLIC BLOOD PRESSURE: 76 MMHG | WEIGHT: 293 LBS

## 2023-12-14 DIAGNOSIS — Z28.21 VACCINATION DECLINED BY PATIENT: ICD-10-CM

## 2023-12-14 DIAGNOSIS — E03.8 HYPOTHYROIDISM DUE TO HASHIMOTO'S THYROIDITIS: ICD-10-CM

## 2023-12-14 DIAGNOSIS — R79.89 ABNORMAL CBC MEASUREMENT: ICD-10-CM

## 2023-12-14 DIAGNOSIS — E06.3 HYPOTHYROIDISM DUE TO HASHIMOTO'S THYROIDITIS: ICD-10-CM

## 2023-12-14 DIAGNOSIS — R35.0 URINARY FREQUENCY: ICD-10-CM

## 2023-12-14 DIAGNOSIS — R73.01 IMPAIRED FASTING GLUCOSE: ICD-10-CM

## 2023-12-14 DIAGNOSIS — Z13.6 SCREENING FOR CARDIOVASCULAR CONDITION: ICD-10-CM

## 2023-12-14 DIAGNOSIS — R74.01 ELEVATED TRANSAMINASE LEVEL: ICD-10-CM

## 2023-12-14 DIAGNOSIS — Z00.00 MEDICARE ANNUAL WELLNESS VISIT, SUBSEQUENT: Primary | ICD-10-CM

## 2023-12-14 PROCEDURE — G0446 INTENS BEHAVE THER CARDIO DX: HCPCS | Performed by: NURSE PRACTITIONER

## 2023-12-14 PROCEDURE — G0439 PPPS, SUBSEQ VISIT: HCPCS | Performed by: NURSE PRACTITIONER

## 2023-12-14 PROCEDURE — 1123F ACP DISCUSS/DSCN MKR DOCD: CPT | Performed by: NURSE PRACTITIONER

## 2023-12-14 ASSESSMENT — LIFESTYLE VARIABLES
HOW OFTEN DO YOU HAVE A DRINK CONTAINING ALCOHOL: NEVER
HOW MANY STANDARD DRINKS CONTAINING ALCOHOL DO YOU HAVE ON A TYPICAL DAY: PATIENT DOES NOT DRINK

## 2023-12-14 NOTE — PROGRESS NOTES
Medicare Annual Wellness Visit    Leda Dorsey is here for Medicare AWV and Health Maintenance (Declined flu, due for RSV, tdap, )    Assessment & Plan   Medicare annual wellness visit, subsequent  Hypothyroidism due to Hashimoto's thyroiditis  -     TSH; Future  -     T4, Free; Future  Vaccination declined by patient  Body mass index (BMI) 45.0-49.9, adult ([facility])  Screening for cardiovascular condition  -     Lipid Panel; Future  -     NM Intensive Behavior Counseling for Cardiovascular Diseases, 8-15 minutes []  Urinary frequency  -     Urinalysis; Future  -     Culture, Urine; Future    Recommendations for Preventive Services Due: see orders and patient instructions/AVS.  Recommended screening schedule for the next 5-10 years is provided to the patient in written form: see Patient Instructions/AVS.     Return in 3 months (on 3/14/2024) for thyroid and then Medicare Annual Wellness Visit in 1 year. Subjective       Patient's complete Health Risk Assessment and screening values have been reviewed and are found in Flowsheets. The following problems were reviewed today and where indicated follow up appointments were made and/or referrals ordered.     Positive Risk Factor Screenings with Interventions:    Fall Risk:  Do you feel unsteady or are you worried about falling? : (!) yes  2 or more falls in past year?: no  Fall with injury in past year?: no     Interventions:    Patient comments: I was swimming but it was just too much for me to do, I am trying to walk as much as I can  Patient declines any further evaluation or treatment              Weight and Activity:  Physical Activity: Inactive (12/14/2023)    Exercise Vital Sign     Days of Exercise per Week: 0 days     Minutes of Exercise per Session: 0 min     On average, how many days per week do you engage in moderate to strenuous exercise (like a brisk walk)?: 0 days  Have you lost any weight without trying in the past 3 months?: No  Body mass index is

## 2023-12-14 NOTE — PATIENT INSTRUCTIONS
are paid in full while other may be subject to a deductible, co-insurance, and/or copay. Some of these benefits include a comprehensive review of your medical history including lifestyle, illnesses that may run in your family, and various assessments and screenings as appropriate. After reviewing your medical record and screening and assessments performed today your provider may have ordered immunizations, labs, imaging, and/or referrals for you. A list of these orders (if applicable) as well as your Preventive Care list are included within your After Visit Summary for your review. Other Preventive Recommendations:    A preventive eye exam performed by an eye specialist is recommended every 1-2 years to screen for glaucoma; cataracts, macular degeneration, and other eye disorders. A preventive dental visit is recommended every 6 months. Try to get at least 150 minutes of exercise per week or 10,000 steps per day on a pedometer . Order or download the FREE \"Exercise & Physical Activity: Your Everyday Guide\" from [employer]. Call 4-730.854.1792 or search [employer] online. You need 0979-8946 mg of calcium and 6235-0501 IU of vitamin D per day. It is possible to meet your calcium requirement with diet alone, but a vitamin D supplement is usually necessary to meet this goal.  When exposed to the sun, use a sunscreen that protects against both UVA and UVB radiation with an SPF of 30 or greater. Reapply every 2 to 3 hours or after sweating, drying off with a towel, or swimming. Always wear a seat belt when traveling in a car. Always wear a helmet when riding a bicycle or motorcycle.

## 2023-12-15 DIAGNOSIS — F41.9 ANXIETY AND DEPRESSION: Chronic | ICD-10-CM

## 2023-12-15 DIAGNOSIS — E06.3 HYPOTHYROIDISM DUE TO HASHIMOTO'S THYROIDITIS: ICD-10-CM

## 2023-12-15 DIAGNOSIS — N39.0 RECURRENT UTI: ICD-10-CM

## 2023-12-15 DIAGNOSIS — F32.A ANXIETY AND DEPRESSION: Chronic | ICD-10-CM

## 2023-12-15 DIAGNOSIS — E03.8 HYPOTHYROIDISM DUE TO HASHIMOTO'S THYROIDITIS: ICD-10-CM

## 2023-12-15 LAB
ALBUMIN SERPL-MCNC: 4.5 G/DL (ref 3.5–5.2)
ALP BLD-CCNC: 109 U/L (ref 35–104)
ALT SERPL-CCNC: 45 U/L (ref 0–32)
ANION GAP SERPL CALCULATED.3IONS-SCNC: 16 MMOL/L (ref 7–16)
AST SERPL-CCNC: 43 U/L (ref 0–31)
ATYPICAL LYMPHOCYTE ABSOLUTE COUNT: 0.03 K/UL (ref 0–0.46)
ATYPICAL LYMPHOCYTES: 1 % (ref 0–4)
BACTERIA: ABNORMAL
BASOPHILS ABSOLUTE: 0 K/UL (ref 0–0.2)
BASOPHILS RELATIVE PERCENT: 0 % (ref 0–2)
BILIRUB SERPL-MCNC: 0.7 MG/DL (ref 0–1.2)
BILIRUBIN URINE: NEGATIVE
BUN BLDV-MCNC: 18 MG/DL (ref 6–23)
CALCIUM SERPL-MCNC: 9.3 MG/DL (ref 8.6–10.2)
CHLORIDE BLD-SCNC: 104 MMOL/L (ref 98–107)
CHOLESTEROL: 191 MG/DL
CO2: 22 MMOL/L (ref 22–29)
COLOR: YELLOW
CREAT SERPL-MCNC: 0.8 MG/DL (ref 0.5–1)
EOSINOPHILS ABSOLUTE: 0 K/UL (ref 0.05–0.5)
EOSINOPHILS RELATIVE PERCENT: 0 % (ref 0–6)
EPITHELIAL CELLS UA: ABNORMAL /HPF
GFR SERPL CREATININE-BSD FRML MDRD: >60 ML/MIN/1.73M2
GLUCOSE BLD-MCNC: 98 MG/DL (ref 74–99)
GLUCOSE URINE: NEGATIVE MG/DL
HBA1C MFR BLD: 5.7 % (ref 4–5.6)
HCT VFR BLD CALC: 41.8 % (ref 34–48)
HDLC SERPL-MCNC: 61 MG/DL
HEMOGLOBIN: 13.6 G/DL (ref 11.5–15.5)
KETONES, URINE: NEGATIVE MG/DL
LDL CHOLESTEROL: 109 MG/DL
LEUKOCYTE ESTERASE, URINE: ABNORMAL
LYMPHOCYTES ABSOLUTE: 0.45 K/UL (ref 1.5–4)
LYMPHOCYTES RELATIVE PERCENT: 14 % (ref 20–42)
MCH RBC QN AUTO: 29.8 PG (ref 26–35)
MCHC RBC AUTO-ENTMCNC: 32.5 G/DL (ref 32–34.5)
MCV RBC AUTO: 91.7 FL (ref 80–99.9)
MONOCYTES ABSOLUTE: 0.14 K/UL (ref 0.1–0.95)
MONOCYTES RELATIVE PERCENT: 4 % (ref 2–12)
NEUTROPHILS ABSOLUTE: 2.59 K/UL (ref 1.8–7.3)
NEUTROPHILS RELATIVE PERCENT: 81 % (ref 43–80)
NITRITE, URINE: NEGATIVE
PDW BLD-RTO: 13.6 % (ref 11.5–15)
PH UA: 6.5 (ref 5–9)
PLATELET # BLD: 189 K/UL (ref 130–450)
PMV BLD AUTO: 10.4 FL (ref 7–12)
POTASSIUM SERPL-SCNC: 4.6 MMOL/L (ref 3.5–5)
PROTEIN UA: 30 MG/DL
RBC # BLD: 4.56 M/UL (ref 3.5–5.5)
RBC # BLD: ABNORMAL 10*6/UL
RBC UA: ABNORMAL /HPF
SODIUM BLD-SCNC: 142 MMOL/L (ref 132–146)
SPECIFIC GRAVITY UA: 1.02 (ref 1–1.03)
T4 FREE: 1.7 NG/DL (ref 0.9–1.7)
TOTAL PROTEIN: 7.4 G/DL (ref 6.4–8.3)
TRIGL SERPL-MCNC: 104 MG/DL
TSH SERPL DL<=0.05 MIU/L-ACNC: 2.01 UIU/ML (ref 0.27–4.2)
TURBIDITY: ABNORMAL
URINE HGB: ABNORMAL
UROBILINOGEN, URINE: 0.2 EU/DL (ref 0–1)
VLDLC SERPL CALC-MCNC: 21 MG/DL
WBC # BLD: 3.2 K/UL (ref 4.5–11.5)
WBC UA: ABNORMAL /HPF

## 2023-12-15 RX ORDER — NITROFURANTOIN 25; 75 MG/1; MG/1
100 CAPSULE ORAL 2 TIMES DAILY
Qty: 20 CAPSULE | Refills: 0 | Status: SHIPPED | OUTPATIENT
Start: 2023-12-15 | End: 2023-12-25

## 2023-12-15 RX ORDER — LEVOTHYROXINE SODIUM 137 UG/1
137 TABLET ORAL DAILY
Qty: 90 TABLET | Refills: 0 | Status: SHIPPED | OUTPATIENT
Start: 2023-12-15

## 2023-12-15 RX ORDER — SERTRALINE HYDROCHLORIDE 100 MG/1
100 TABLET, FILM COATED ORAL NIGHTLY
Qty: 90 TABLET | Refills: 0 | Status: SHIPPED | OUTPATIENT
Start: 2023-12-15

## 2023-12-17 LAB
CULTURE: ABNORMAL
SPECIMEN DESCRIPTION: ABNORMAL

## 2024-04-15 ASSESSMENT — PATIENT HEALTH QUESTIONNAIRE - PHQ9
SUM OF ALL RESPONSES TO PHQ QUESTIONS 1-9: 3
SUM OF ALL RESPONSES TO PHQ QUESTIONS 1-9: 3
8. MOVING OR SPEAKING SO SLOWLY THAT OTHER PEOPLE COULD HAVE NOTICED. OR THE OPPOSITE - BEING SO FIDGETY OR RESTLESS THAT YOU HAVE BEEN MOVING AROUND A LOT MORE THAN USUAL: NOT AT ALL
4. FEELING TIRED OR HAVING LITTLE ENERGY: NEARLY EVERY DAY
1. LITTLE INTEREST OR PLEASURE IN DOING THINGS: NOT AT ALL
9. THOUGHTS THAT YOU WOULD BE BETTER OFF DEAD, OR OF HURTING YOURSELF: NOT AT ALL
3. TROUBLE FALLING OR STAYING ASLEEP: NOT AT ALL
SUM OF ALL RESPONSES TO PHQ9 QUESTIONS 1 & 2: 0
4. FEELING TIRED OR HAVING LITTLE ENERGY: NEARLY EVERY DAY
SUM OF ALL RESPONSES TO PHQ QUESTIONS 1-9: 3
2. FEELING DOWN, DEPRESSED OR HOPELESS: NOT AT ALL
6. FEELING BAD ABOUT YOURSELF - OR THAT YOU ARE A FAILURE OR HAVE LET YOURSELF OR YOUR FAMILY DOWN: NOT AT ALL
8. MOVING OR SPEAKING SO SLOWLY THAT OTHER PEOPLE COULD HAVE NOTICED. OR THE OPPOSITE, BEING SO FIGETY OR RESTLESS THAT YOU HAVE BEEN MOVING AROUND A LOT MORE THAN USUAL: NOT AT ALL
5. POOR APPETITE OR OVEREATING: NOT AT ALL
7. TROUBLE CONCENTRATING ON THINGS, SUCH AS READING THE NEWSPAPER OR WATCHING TELEVISION: NOT AT ALL
6. FEELING BAD ABOUT YOURSELF - OR THAT YOU ARE A FAILURE OR HAVE LET YOURSELF OR YOUR FAMILY DOWN: NOT AT ALL
2. FEELING DOWN, DEPRESSED OR HOPELESS: NOT AT ALL
5. POOR APPETITE OR OVEREATING: NOT AT ALL
10. IF YOU CHECKED OFF ANY PROBLEMS, HOW DIFFICULT HAVE THESE PROBLEMS MADE IT FOR YOU TO DO YOUR WORK, TAKE CARE OF THINGS AT HOME, OR GET ALONG WITH OTHER PEOPLE: NOT DIFFICULT AT ALL
SUM OF ALL RESPONSES TO PHQ QUESTIONS 1-9: 3
SUM OF ALL RESPONSES TO PHQ QUESTIONS 1-9: 3
9. THOUGHTS THAT YOU WOULD BE BETTER OFF DEAD, OR OF HURTING YOURSELF: NOT AT ALL
3. TROUBLE FALLING OR STAYING ASLEEP: NOT AT ALL
7. TROUBLE CONCENTRATING ON THINGS, SUCH AS READING THE NEWSPAPER OR WATCHING TELEVISION: NOT AT ALL
1. LITTLE INTEREST OR PLEASURE IN DOING THINGS: NOT AT ALL
10. IF YOU CHECKED OFF ANY PROBLEMS, HOW DIFFICULT HAVE THESE PROBLEMS MADE IT FOR YOU TO DO YOUR WORK, TAKE CARE OF THINGS AT HOME, OR GET ALONG WITH OTHER PEOPLE: NOT DIFFICULT AT ALL

## 2024-04-16 ENCOUNTER — OFFICE VISIT (OUTPATIENT)
Dept: FAMILY MEDICINE CLINIC | Age: 69
End: 2024-04-16
Payer: COMMERCIAL

## 2024-04-16 VITALS
HEIGHT: 72 IN | WEIGHT: 293 LBS | OXYGEN SATURATION: 98 % | DIASTOLIC BLOOD PRESSURE: 74 MMHG | SYSTOLIC BLOOD PRESSURE: 128 MMHG | HEART RATE: 84 BPM | TEMPERATURE: 97.3 F | BODY MASS INDEX: 39.68 KG/M2 | RESPIRATION RATE: 16 BRPM

## 2024-04-16 DIAGNOSIS — R39.15 URINARY URGENCY: ICD-10-CM

## 2024-04-16 DIAGNOSIS — F32.A ANXIETY AND DEPRESSION: Chronic | ICD-10-CM

## 2024-04-16 DIAGNOSIS — E03.8 HYPOTHYROIDISM DUE TO HASHIMOTO'S THYROIDITIS: Primary | ICD-10-CM

## 2024-04-16 DIAGNOSIS — R73.01 IMPAIRED FASTING BLOOD SUGAR: ICD-10-CM

## 2024-04-16 DIAGNOSIS — F41.9 ANXIETY AND DEPRESSION: Chronic | ICD-10-CM

## 2024-04-16 DIAGNOSIS — E06.3 HYPOTHYROIDISM DUE TO HASHIMOTO'S THYROIDITIS: Primary | ICD-10-CM

## 2024-04-16 DIAGNOSIS — M05.79 RHEUMATOID ARTHRITIS INVOLVING MULTIPLE SITES WITH POSITIVE RHEUMATOID FACTOR (HCC): ICD-10-CM

## 2024-04-16 LAB
BILIRUBIN, POC: NEGATIVE
BLOOD URINE, POC: ABNORMAL
CLARITY, POC: ABNORMAL
COLOR, POC: YELLOW
GLUCOSE URINE, POC: NEGATIVE
KETONES, POC: ABNORMAL
LEUKOCYTE EST, POC: ABNORMAL
NITRITE, POC: NEGATIVE
PH, POC: 6
PROTEIN, POC: >=300
SPECIFIC GRAVITY, POC: >=300
UROBILINOGEN, POC: 0.2

## 2024-04-16 PROCEDURE — 81002 URINALYSIS NONAUTO W/O SCOPE: CPT | Performed by: NURSE PRACTITIONER

## 2024-04-16 PROCEDURE — 99214 OFFICE O/P EST MOD 30 MIN: CPT | Performed by: NURSE PRACTITIONER

## 2024-04-16 PROCEDURE — 1123F ACP DISCUSS/DSCN MKR DOCD: CPT | Performed by: NURSE PRACTITIONER

## 2024-04-16 RX ORDER — LEVOTHYROXINE SODIUM 137 UG/1
137 TABLET ORAL DAILY
Qty: 90 TABLET | Refills: 1 | Status: SHIPPED | OUTPATIENT
Start: 2024-04-16

## 2024-04-16 RX ORDER — SERTRALINE HYDROCHLORIDE 100 MG/1
100 TABLET, FILM COATED ORAL NIGHTLY
Qty: 90 TABLET | Refills: 1 | Status: SHIPPED | OUTPATIENT
Start: 2024-04-16

## 2024-04-16 ASSESSMENT — ENCOUNTER SYMPTOMS
DIARRHEA: 0
CHEST TIGHTNESS: 0
BACK PAIN: 0
NAUSEA: 0
SORE THROAT: 0
RHINORRHEA: 0
COUGH: 0
COLOR CHANGE: 0
VOICE CHANGE: 0
CONSTIPATION: 0
VOMITING: 0
ABDOMINAL PAIN: 0
WHEEZING: 0
SHORTNESS OF BREATH: 0
FACIAL SWELLING: 0
SINUS PRESSURE: 0
TROUBLE SWALLOWING: 0
SINUS PAIN: 0

## 2024-04-16 NOTE — ASSESSMENT & PLAN NOTE
Check urine culture does have blood and protein in UA today, if persists will need further work up and see urology. Will send message with urine culture results and if need treatment with antibiotic.

## 2024-04-16 NOTE — ASSESSMENT & PLAN NOTE
Monitored by specialist- no acute findings meriting change in the plan on methotrexate, folic acid and Rinvoq. Doing much better has appt next week with rheumatology

## 2024-04-16 NOTE — ASSESSMENT & PLAN NOTE
asymptomatic, no significant side effects from medication noted. Continue Levothyroxine 137 mcg daily, labs reviewed:CMP, CBCD, TSH, FT4, Fasting labs ordered   Discussed taking same time every day on empty stomach, Wait 30 - 60 minutes to have breakfast or other medications.No PPI's or acid blockers, vitamins, minerals, calcium or iron for 4 hours after taking thyroid medication.

## 2024-04-16 NOTE — PROGRESS NOTES
postnasal drip, rhinorrhea, sinus pressure, sinus pain, sneezing, sore throat, tinnitus, trouble swallowing and voice change.    Eyes:  Negative for visual disturbance.   Respiratory:  Negative for cough, chest tightness, shortness of breath and wheezing.    Cardiovascular:  Negative for chest pain, palpitations and leg swelling.   Gastrointestinal:  Negative for abdominal pain, constipation, diarrhea, nausea and vomiting.        Early satiety but declines to see GI and no colonoscopy   Endocrine: Negative for cold intolerance, heat intolerance, polydipsia, polyphagia and polyuria.   Genitourinary:  Positive for urgency. Negative for difficulty urinating and frequency.   Musculoskeletal:  Positive for arthralgias and gait problem. Negative for back pain, joint swelling, myalgias, neck pain and neck stiffness.   Skin:  Negative for color change, pallor, rash and wound.   Allergic/Immunologic: Negative for environmental allergies, food allergies and immunocompromised state.   Neurological:  Negative for dizziness, tremors, seizures, syncope, facial asymmetry, speech difficulty, weakness, light-headedness, numbness and headaches.   Hematological:  Negative for adenopathy. Does not bruise/bleed easily.   Psychiatric/Behavioral:  Negative for agitation, behavioral problems, confusion, decreased concentration, dysphoric mood, hallucinations, self-injury, sleep disturbance and suicidal ideas. The patient is not nervous/anxious and is not hyperactive.          OBJECTIVE:     VS:  Wt Readings from Last 3 Encounters:   04/16/24 (!) 157.9 kg (348 lb)   12/14/23 (!) 161.5 kg (356 lb)   05/23/23 (!) 162.4 kg (358 lb)                       Vitals:    04/16/24 1254   BP: 128/74   Pulse: 84   Resp: 16   Temp: 97.3 °F (36.3 °C)   SpO2: 98%   Weight: (!) 157.9 kg (348 lb)   Height: 1.829 m (6')       General: Alert and oriented to person, place, and time, well developed and well nourished, morbidly obese,  in no acute distress  SKIN:

## 2024-04-19 DIAGNOSIS — N39.0 RECURRENT UTI: Primary | ICD-10-CM

## 2024-04-19 DIAGNOSIS — M05.79 RHEUMATOID ARTHRITIS INVOLVING MULTIPLE SITES WITH POSITIVE RHEUMATOID FACTOR (HCC): Chronic | ICD-10-CM

## 2024-04-19 LAB
CULTURE: ABNORMAL
SPECIMEN DESCRIPTION: ABNORMAL

## 2024-04-19 RX ORDER — SULFAMETHOXAZOLE AND TRIMETHOPRIM 800; 160 MG/1; MG/1
1 TABLET ORAL 2 TIMES DAILY
Qty: 20 TABLET | Refills: 0 | Status: SHIPPED | OUTPATIENT
Start: 2024-04-19 | End: 2024-04-29

## 2024-04-19 RX ORDER — FOLIC ACID 1 MG/1
TABLET ORAL
Qty: 30 TABLET | Refills: 6 | Status: SHIPPED | OUTPATIENT
Start: 2024-04-19

## 2024-05-30 DIAGNOSIS — R73.01 IMPAIRED FASTING BLOOD SUGAR: ICD-10-CM

## 2024-05-30 DIAGNOSIS — E06.3 HYPOTHYROIDISM DUE TO HASHIMOTO'S THYROIDITIS: ICD-10-CM

## 2024-05-30 DIAGNOSIS — E03.8 HYPOTHYROIDISM DUE TO HASHIMOTO'S THYROIDITIS: ICD-10-CM

## 2024-05-30 LAB
ALBUMIN: 4.9 G/DL (ref 3.5–5.2)
ALP BLD-CCNC: 99 U/L (ref 35–104)
ALT SERPL-CCNC: 46 U/L (ref 0–32)
ANION GAP SERPL CALCULATED.3IONS-SCNC: 17 MMOL/L (ref 7–16)
AST SERPL-CCNC: 43 U/L (ref 0–31)
BASOPHILS ABSOLUTE: 0.03 K/UL (ref 0–0.2)
BASOPHILS RELATIVE PERCENT: 1 % (ref 0–2)
BILIRUB SERPL-MCNC: 0.7 MG/DL (ref 0–1.2)
BUN BLDV-MCNC: 19 MG/DL (ref 6–23)
CALCIUM SERPL-MCNC: 9.6 MG/DL (ref 8.6–10.2)
CHLORIDE BLD-SCNC: 104 MMOL/L (ref 98–107)
CO2: 23 MMOL/L (ref 22–29)
CREAT SERPL-MCNC: 0.8 MG/DL (ref 0.5–1)
EOSINOPHILS ABSOLUTE: 0.09 K/UL (ref 0.05–0.5)
EOSINOPHILS RELATIVE PERCENT: 3 % (ref 0–6)
GFR, ESTIMATED: 76 ML/MIN/1.73M2
GLUCOSE BLD-MCNC: 105 MG/DL (ref 74–99)
HCT VFR BLD CALC: 43.8 % (ref 34–48)
HEMOGLOBIN: 14.2 G/DL (ref 11.5–15.5)
LYMPHOCYTES ABSOLUTE: 0.31 K/UL (ref 1.5–4)
LYMPHOCYTES RELATIVE PERCENT: 10 % (ref 20–42)
MCH RBC QN AUTO: 29.8 PG (ref 26–35)
MCHC RBC AUTO-ENTMCNC: 32.4 G/DL (ref 32–34.5)
MCV RBC AUTO: 91.8 FL (ref 80–99.9)
MONOCYTES ABSOLUTE: 0.46 K/UL (ref 0.1–0.95)
MONOCYTES RELATIVE PERCENT: 14 % (ref 2–12)
NEUTROPHILS ABSOLUTE: 2.42 K/UL (ref 1.8–7.3)
NEUTROPHILS RELATIVE PERCENT: 73 % (ref 43–80)
PDW BLD-RTO: 13.7 % (ref 11.5–15)
PLATELET # BLD: 219 K/UL (ref 130–450)
PMV BLD AUTO: 10.4 FL (ref 7–12)
POTASSIUM SERPL-SCNC: 4.8 MMOL/L (ref 3.5–5)
RBC # BLD: 4.77 M/UL (ref 3.5–5.5)
RBC # BLD: ABNORMAL 10*6/UL
SODIUM BLD-SCNC: 144 MMOL/L (ref 132–146)
T4 FREE: 1.6 NG/DL (ref 0.9–1.7)
TOTAL PROTEIN: 7.7 G/DL (ref 6.4–8.3)
TSH SERPL DL<=0.05 MIU/L-ACNC: 2.02 UIU/ML (ref 0.27–4.2)
WBC # BLD: 3.3 K/UL (ref 4.5–11.5)

## 2024-05-31 LAB — HBA1C MFR BLD: 5.5 % (ref 4–5.6)

## 2024-06-24 LAB
ALBUMIN SERPL-MCNC: NORMAL G/DL
ALP BLD-CCNC: NORMAL U/L
ALT SERPL-CCNC: NORMAL U/L
ANION GAP SERPL CALCULATED.3IONS-SCNC: NORMAL MMOL/L
AST SERPL-CCNC: NORMAL U/L
BASOPHILS ABSOLUTE: NORMAL
BASOPHILS RELATIVE PERCENT: NORMAL
BILIRUB SERPL-MCNC: NORMAL MG/DL
BUN BLDV-MCNC: NORMAL MG/DL
CALCIUM SERPL-MCNC: NORMAL MG/DL
CHLORIDE BLD-SCNC: NORMAL MMOL/L
CO2: NORMAL
CREAT SERPL-MCNC: NORMAL MG/DL
EGFR: NORMAL
EOSINOPHILS ABSOLUTE: NORMAL
EOSINOPHILS RELATIVE PERCENT: NORMAL
GLUCOSE BLD-MCNC: NORMAL MG/DL
HCT VFR BLD CALC: NORMAL %
HEMOGLOBIN: NORMAL
LYMPHOCYTES ABSOLUTE: NORMAL
LYMPHOCYTES RELATIVE PERCENT: NORMAL
MCH RBC QN AUTO: NORMAL PG
MCHC RBC AUTO-ENTMCNC: NORMAL G/DL
MCV RBC AUTO: NORMAL FL
MONOCYTES ABSOLUTE: NORMAL
MONOCYTES RELATIVE PERCENT: NORMAL
NEUTROPHILS ABSOLUTE: NORMAL
NEUTROPHILS RELATIVE PERCENT: NORMAL
PLATELET # BLD: NORMAL 10*3/UL
PMV BLD AUTO: NORMAL FL
POTASSIUM SERPL-SCNC: NORMAL MMOL/L
RBC # BLD: NORMAL 10*6/UL
SODIUM BLD-SCNC: NORMAL MMOL/L
TOTAL PROTEIN: NORMAL
WBC # BLD: NORMAL 10*3/UL

## 2024-06-26 LAB
BILIRUBIN URINE: NORMAL
BLOOD, URINE: NORMAL
CLARITY: NORMAL
COLOR: NORMAL
GLUCOSE URINE: NORMAL
KETONES, URINE: NORMAL
LEUKOCYTE ESTERASE, URINE: NORMAL
NITRITE, URINE: NORMAL
PH UA: NORMAL
PROTEIN UA: NORMAL
SPECIFIC GRAVITY UA: NORMAL
UROBILINOGEN, URINE: NORMAL

## 2024-08-12 ENCOUNTER — PATIENT MESSAGE (OUTPATIENT)
Dept: FAMILY MEDICINE CLINIC | Age: 69
End: 2024-08-12

## 2024-08-12 DIAGNOSIS — E06.3 HYPOTHYROIDISM DUE TO HASHIMOTO'S THYROIDITIS: ICD-10-CM

## 2024-08-12 DIAGNOSIS — B37.89 CANDIDIASIS OF BREAST: ICD-10-CM

## 2024-08-12 DIAGNOSIS — F32.A ANXIETY AND DEPRESSION: Chronic | ICD-10-CM

## 2024-08-12 DIAGNOSIS — F41.9 ANXIETY AND DEPRESSION: Chronic | ICD-10-CM

## 2024-08-12 DIAGNOSIS — E03.8 HYPOTHYROIDISM DUE TO HASHIMOTO'S THYROIDITIS: ICD-10-CM

## 2024-08-12 DIAGNOSIS — M05.79 RHEUMATOID ARTHRITIS INVOLVING MULTIPLE SITES WITH POSITIVE RHEUMATOID FACTOR (HCC): Chronic | ICD-10-CM

## 2024-08-13 RX ORDER — SERTRALINE HYDROCHLORIDE 100 MG/1
100 TABLET, FILM COATED ORAL NIGHTLY
Qty: 90 TABLET | Refills: 0 | Status: SHIPPED | OUTPATIENT
Start: 2024-08-13

## 2024-08-13 RX ORDER — NYSTATIN 100000 [USP'U]/G
POWDER TOPICAL
Qty: 60 G | Refills: 3 | Status: SHIPPED | OUTPATIENT
Start: 2024-08-13

## 2024-08-13 RX ORDER — FOLIC ACID 1 MG/1
TABLET ORAL
Qty: 30 TABLET | Refills: 3 | Status: SHIPPED | OUTPATIENT
Start: 2024-08-13

## 2024-08-13 RX ORDER — LEVOTHYROXINE SODIUM 137 UG/1
137 TABLET ORAL DAILY
Qty: 90 TABLET | Refills: 0 | Status: SHIPPED | OUTPATIENT
Start: 2024-08-13

## 2024-08-13 NOTE — TELEPHONE ENCOUNTER
Last seen 4/16/2024  Next appt 10/16/2024    Requested Prescriptions     Pending Prescriptions Disp Refills    folic acid (FOLVITE) 1 MG tablet 30 tablet 6     Sig: take 1 tablet by mouth once daily    levothyroxine (SYNTHROID) 137 MCG tablet 90 tablet 1     Sig: Take 1 tablet by mouth daily    nystatin (MYCOSTATIN) 536959 UNIT/GM powder 60 g 3     Sig: Apply 3 times daily.    sertraline (ZOLOFT) 100 MG tablet 90 tablet 1     Sig: Take 1 tablet by mouth nightly

## 2024-10-13 SDOH — HEALTH STABILITY: PHYSICAL HEALTH: ON AVERAGE, HOW MANY DAYS PER WEEK DO YOU ENGAGE IN MODERATE TO STRENUOUS EXERCISE (LIKE A BRISK WALK)?: 0 DAYS

## 2024-10-16 ENCOUNTER — OFFICE VISIT (OUTPATIENT)
Dept: FAMILY MEDICINE CLINIC | Age: 69
End: 2024-10-16
Payer: COMMERCIAL

## 2024-10-16 VITALS
BODY MASS INDEX: 39.68 KG/M2 | SYSTOLIC BLOOD PRESSURE: 120 MMHG | HEART RATE: 82 BPM | DIASTOLIC BLOOD PRESSURE: 82 MMHG | WEIGHT: 293 LBS | OXYGEN SATURATION: 96 % | HEIGHT: 72 IN | RESPIRATION RATE: 20 BRPM | TEMPERATURE: 97.9 F

## 2024-10-16 DIAGNOSIS — E66.01 MORBID (SEVERE) OBESITY DUE TO EXCESS CALORIES: ICD-10-CM

## 2024-10-16 DIAGNOSIS — F41.9 ANXIETY AND DEPRESSION: Chronic | ICD-10-CM

## 2024-10-16 DIAGNOSIS — M54.50 CHRONIC MIDLINE LOW BACK PAIN WITHOUT SCIATICA: Primary | ICD-10-CM

## 2024-10-16 DIAGNOSIS — F32.A ANXIETY AND DEPRESSION: Chronic | ICD-10-CM

## 2024-10-16 DIAGNOSIS — K21.9 GASTROESOPHAGEAL REFLUX DISEASE WITHOUT ESOPHAGITIS: Chronic | ICD-10-CM

## 2024-10-16 DIAGNOSIS — Z11.59 ENCOUNTER FOR HEPATITIS C SCREENING TEST FOR LOW RISK PATIENT: ICD-10-CM

## 2024-10-16 DIAGNOSIS — Z12.31 ENCOUNTER FOR SCREENING MAMMOGRAM FOR MALIGNANT NEOPLASM OF BREAST: ICD-10-CM

## 2024-10-16 DIAGNOSIS — E06.3 HYPOTHYROIDISM DUE TO HASHIMOTO'S THYROIDITIS: ICD-10-CM

## 2024-10-16 DIAGNOSIS — E55.9 VITAMIN D DEFICIENCY: Chronic | ICD-10-CM

## 2024-10-16 DIAGNOSIS — G89.29 CHRONIC MIDLINE LOW BACK PAIN WITHOUT SCIATICA: Primary | ICD-10-CM

## 2024-10-16 DIAGNOSIS — R35.0 URINARY FREQUENCY: ICD-10-CM

## 2024-10-16 PROCEDURE — 99214 OFFICE O/P EST MOD 30 MIN: CPT | Performed by: FAMILY MEDICINE

## 2024-10-16 PROCEDURE — 1123F ACP DISCUSS/DSCN MKR DOCD: CPT | Performed by: FAMILY MEDICINE

## 2024-10-16 RX ORDER — IBUPROFEN 800 MG/1
800 TABLET, FILM COATED ORAL PRN
COMMUNITY
End: 2024-10-16

## 2024-10-16 RX ORDER — LEVOTHYROXINE SODIUM 137 UG/1
137 TABLET ORAL DAILY
Qty: 90 TABLET | Refills: 3 | Status: SHIPPED | OUTPATIENT
Start: 2024-10-16

## 2024-10-16 RX ORDER — SERTRALINE HYDROCHLORIDE 100 MG/1
100 TABLET, FILM COATED ORAL NIGHTLY
Qty: 90 TABLET | Refills: 3 | Status: SHIPPED | OUTPATIENT
Start: 2024-10-16

## 2024-10-16 RX ORDER — OMEPRAZOLE 40 MG/1
CAPSULE, DELAYED RELEASE ORAL
Qty: 90 CAPSULE | Refills: 3 | Status: SHIPPED | OUTPATIENT
Start: 2024-10-16

## 2024-10-16 SDOH — ECONOMIC STABILITY: FOOD INSECURITY: WITHIN THE PAST 12 MONTHS, THE FOOD YOU BOUGHT JUST DIDN'T LAST AND YOU DIDN'T HAVE MONEY TO GET MORE.: SOMETIMES TRUE

## 2024-10-16 SDOH — ECONOMIC STABILITY: FOOD INSECURITY: WITHIN THE PAST 12 MONTHS, YOU WORRIED THAT YOUR FOOD WOULD RUN OUT BEFORE YOU GOT MONEY TO BUY MORE.: SOMETIMES TRUE

## 2024-10-16 SDOH — ECONOMIC STABILITY: INCOME INSECURITY: HOW HARD IS IT FOR YOU TO PAY FOR THE VERY BASICS LIKE FOOD, HOUSING, MEDICAL CARE, AND HEATING?: HARD

## 2024-10-16 NOTE — PROGRESS NOTES
[facility]  [address], [place] [address]  Phone: [phone]        Patient:  Rosalie Chen 69 y.o. female          Chief complaint:   Chief Complaint   Patient presents with    New Patient     Previous PCP Kandi Feliz         Health Maintenance     Declined flu vaccine   Due for AWV, RSV, Shingles       Assessment and Plan     Rosalie was seen today for new patient and health maintenance.    Diagnoses and all orders for this visit:    Chronic midline low back pain without sciatica  Chronic and not well-controlled  Avoidance of NSAIDs due to history of CHF and age  Advised okay for Tylenol, Biofreeze, heat  -     XR LUMBAR SPINE (2-3 VIEWS); Future    Anxiety and depression  Chronic and stable  -     sertraline (ZOLOFT) 100 MG tablet; Take 1 tablet by mouth nightly    Gastroesophageal reflux disease without esophagitis  Chronic and stable  -     omeprazole (PRILOSEC) 40 MG delayed release capsule; take 1 capsule by mouth once daily if needed for HEARTBURN    Hypothyroidism due to Hashimoto's thyroiditis  Chronic and stable  -     levothyroxine (SYNTHROID) 137 MCG tablet; Take 1 tablet by mouth daily  Lab Results   Component Value Date    TSH 2.02 05/30/2024     Vitamin D deficiency  Chronic and stable  -     Cholecalciferol 50 MCG (2000 UT) TABS; Take 1 tablet by mouth daily    Encounter for screening mammogram for malignant neoplasm of breast  -     ALANNA TORREY DIGITAL SCREEN BILATERAL PER PROTOCOL; Future    Urinary frequency  Chronic and stable  -     Culture, Urine; Future  -     Culture, Urine        Please see Patient Instructions for further counseling and information given.        Advised to please be adherent to the treatment plans discussed today, and please call with any questions or concerns, letting the office know of any reasons that the plans may not be followed.  The risks of untreated conditions include worsening illness, injury, disability, and possibly, death.

## 2024-10-16 NOTE — PATIENT INSTRUCTIONS
[place] [employer]*  (Call [employer]/211 if need more resources.)         HELP NETWORK OF [facility]:  What they do: Provides 24-hr, 7 days a week access to information on community resources for financial help. [facility] AND [facility]  Phone: 211 or [phone]    [facility] FAMILY SERVICE: [address], [place] [address]  What they offer: Limited assistance to restore/ prevent utility disconnection.  Phone Number: [phone]  Website: [employer]    DEPARTMENT OF JOB AND FAMILY SERVICES:  MAIN [facility] LINE FOR ALL [facility]: [phone]  What they do: [place] works first with temporary cash assistance if there are children in household.   [facility] DJFS: [address] [place] [address]  Phone: [phone], [phone]  [facility] DJFS: [address] [place] [address]  Phone: [phone]  [facility] DJFS: [address] [place] [address]  Phone: [phone]  Website: s.[place].[facility]    [employer] Financial Assistance  What they offer: Assistance with Online-[place] bills  Phone: [phone], option #5     Medications:  Good Rx  What they offer: Good [employer] tracks prescription drug prices and provides free drug coupons for discounts on medications.  Website: https://www.[employer]    [employer]  What they offer: [employer] offers free information on medications and healthcare cost savings programs including prescription assistance programs, coupons, and discount programs.  Helpline: 447.603.7891  Website: https://www.[employer].org    RX Assist  What they offer: Information about free and low-cost medicine programs.  Website: https://www.rxassist.org/    [employer] $4 Prescription Program  What they offer: Prescription Program includes up to a 30-day supply for $4 and a 90-day supply for $10 of some covered generic drugs at commonly prescribed dosages  Website: https://www.[employer]/cp/4-prescriptions/1536893    Online-OR

## 2024-10-18 LAB
CULTURE: ABNORMAL
SPECIMEN DESCRIPTION: ABNORMAL

## 2024-10-18 RX ORDER — NITROFURANTOIN 25; 75 MG/1; MG/1
100 CAPSULE ORAL 2 TIMES DAILY
Qty: 10 CAPSULE | Refills: 0 | Status: SHIPPED | OUTPATIENT
Start: 2024-10-18 | End: 2024-10-23

## 2024-11-19 DIAGNOSIS — F32.A ANXIETY AND DEPRESSION: Chronic | ICD-10-CM

## 2024-11-19 DIAGNOSIS — F41.9 ANXIETY AND DEPRESSION: Chronic | ICD-10-CM

## 2024-11-19 RX ORDER — SERTRALINE HYDROCHLORIDE 100 MG/1
100 TABLET, FILM COATED ORAL NIGHTLY
Qty: 90 TABLET | Refills: 3 | OUTPATIENT
Start: 2024-11-19

## 2024-12-13 ENCOUNTER — PATIENT MESSAGE (OUTPATIENT)
Dept: FAMILY MEDICINE CLINIC | Age: 69
End: 2024-12-13

## 2024-12-13 RX ORDER — GUAIFENESIN/DEXTROMETHORPHAN 100-10MG/5
5 SYRUP ORAL 3 TIMES DAILY PRN
Qty: 120 ML | Refills: 0 | Status: SHIPPED | OUTPATIENT
Start: 2024-12-13 | End: 2024-12-23

## 2024-12-26 ENCOUNTER — HOSPITAL ENCOUNTER (OUTPATIENT)
Dept: MAMMOGRAPHY | Age: 69
Discharge: HOME | End: 2024-12-28
Attending: FAMILY MEDICINE

## 2024-12-26 DIAGNOSIS — Z12.31 ENCOUNTER FOR SCREENING MAMMOGRAM FOR MALIGNANT NEOPLASM OF BREAST: ICD-10-CM

## 2025-01-27 ENCOUNTER — OFFICE VISIT (OUTPATIENT)
Dept: FAMILY MEDICINE CLINIC | Age: 70
End: 2025-01-27
Payer: MEDICARE

## 2025-01-27 VITALS
BODY MASS INDEX: 39.68 KG/M2 | HEIGHT: 72 IN | SYSTOLIC BLOOD PRESSURE: 132 MMHG | WEIGHT: 293 LBS | RESPIRATION RATE: 19 BRPM | TEMPERATURE: 97.4 F | HEART RATE: 71 BPM | DIASTOLIC BLOOD PRESSURE: 78 MMHG | OXYGEN SATURATION: 99 %

## 2025-01-27 DIAGNOSIS — R39.9 UTI SYMPTOMS: ICD-10-CM

## 2025-01-27 DIAGNOSIS — N30.01 ACUTE CYSTITIS WITH HEMATURIA: Primary | ICD-10-CM

## 2025-01-27 LAB
BILIRUBIN, POC: NORMAL
BLOOD URINE, POC: NORMAL
CLARITY, POC: CLEAR
COLOR, POC: YELLOW
GLUCOSE URINE, POC: NORMAL MG/DL
KETONES, POC: NORMAL MG/DL
LEUKOCYTE EST, POC: NORMAL
NITRITE, POC: NORMAL
PH, POC: 6.5
PROTEIN, POC: NORMAL MG/DL
SPECIFIC GRAVITY, POC: 1.02
UROBILINOGEN, POC: 0.2 MG/DL

## 2025-01-27 PROCEDURE — 99213 OFFICE O/P EST LOW 20 MIN: CPT

## 2025-01-27 PROCEDURE — 1123F ACP DISCUSS/DSCN MKR DOCD: CPT

## 2025-01-27 PROCEDURE — 1160F RVW MEDS BY RX/DR IN RCRD: CPT

## 2025-01-27 PROCEDURE — 1159F MED LIST DOCD IN RCRD: CPT

## 2025-01-27 PROCEDURE — 81002 URINALYSIS NONAUTO W/O SCOPE: CPT

## 2025-01-27 RX ORDER — CEFDINIR 300 MG/1
300 CAPSULE ORAL EVERY 12 HOURS
Qty: 14 CAPSULE | Refills: 0 | Status: SHIPPED | OUTPATIENT
Start: 2025-01-27 | End: 2025-02-03

## 2025-01-27 NOTE — PROGRESS NOTES
neg        Imaging:  All Radiology results interpreted by Radiologist unless otherwise noted.  No results found.    Medical Decision Making   Patient is well appearing, non toxic and appropriate for outpatient management.  Plan is for symptom management and PCP follow up.     Assessment / Plan   Impression(s):  Rosalie was seen today for urinary tract infection.    Diagnoses and all orders for this visit:    Acute cystitis with hematuria  -     cefdinir (OMNICEF) 300 MG capsule; Take 1 capsule by mouth every 12 hours for 7 days    UTI symptoms  -     Culture, Urine  -     POCT Urinalysis no Micro      UA appears positive for a UTI. Urine C&S pending, will call with results once available. Script written for cefdinir, side effects and administration discussed. Increase fluids and rest. F/u PCP if symptoms persist. ED sooner if symptoms worsen or change. ED immediately with the development of fever, shaking chills, body aches, flank pain, vomiting, CP, or SOB. Pt is in agreement with this care plan. All questions answered.     No follow-ups on file.    Electronically signed by AMBREEN Mata CNP   DD: 1/27/25    **This report was transcribed using voice recognition software. Every effort was made to ensure accuracy; however, inadvertent computerized transcription errors may be present.

## 2025-01-29 ENCOUNTER — TELEPHONE (OUTPATIENT)
Dept: FAMILY MEDICINE CLINIC | Age: 70
End: 2025-01-29

## 2025-01-29 LAB
CULTURE: ABNORMAL
SPECIMEN DESCRIPTION: ABNORMAL

## 2025-01-29 RX ORDER — NITROFURANTOIN 25; 75 MG/1; MG/1
100 CAPSULE ORAL 2 TIMES DAILY
Qty: 10 CAPSULE | Refills: 0 | Status: SHIPPED | OUTPATIENT
Start: 2025-01-29 | End: 2025-02-03

## 2025-01-29 NOTE — TELEPHONE ENCOUNTER
Called pt notifying that PCP would like to see her to follow up on UTI. PT agreed to come in tomorrow 1/29/25 at 4 pm for acute visit.

## 2025-01-30 ENCOUNTER — PATIENT MESSAGE (OUTPATIENT)
Dept: FAMILY MEDICINE CLINIC | Age: 70
End: 2025-01-30

## 2025-01-30 NOTE — TELEPHONE ENCOUNTER
Pt reached out to ask if she should wait for medication to start working before coming in to her appt. PCP advised for pt to wait to see how new meds work and no need to come in to office today.

## 2025-03-27 SDOH — HEALTH STABILITY: PHYSICAL HEALTH: ON AVERAGE, HOW MANY DAYS PER WEEK DO YOU ENGAGE IN MODERATE TO STRENUOUS EXERCISE (LIKE A BRISK WALK)?: 0 DAYS

## 2025-03-27 SDOH — HEALTH STABILITY: PHYSICAL HEALTH: ON AVERAGE, HOW MANY MINUTES DO YOU ENGAGE IN EXERCISE AT THIS LEVEL?: 0 MIN

## 2025-03-27 ASSESSMENT — PATIENT HEALTH QUESTIONNAIRE - PHQ9
SUM OF ALL RESPONSES TO PHQ QUESTIONS 1-9: 0
SUM OF ALL RESPONSES TO PHQ QUESTIONS 1-9: 0
1. LITTLE INTEREST OR PLEASURE IN DOING THINGS: NOT AT ALL
SUM OF ALL RESPONSES TO PHQ QUESTIONS 1-9: 0
SUM OF ALL RESPONSES TO PHQ QUESTIONS 1-9: 0
2. FEELING DOWN, DEPRESSED OR HOPELESS: NOT AT ALL

## 2025-03-27 ASSESSMENT — LIFESTYLE VARIABLES
HOW OFTEN DO YOU HAVE A DRINK CONTAINING ALCOHOL: 1
HOW MANY STANDARD DRINKS CONTAINING ALCOHOL DO YOU HAVE ON A TYPICAL DAY: PATIENT DOES NOT DRINK
HOW OFTEN DO YOU HAVE SIX OR MORE DRINKS ON ONE OCCASION: 1
HOW MANY STANDARD DRINKS CONTAINING ALCOHOL DO YOU HAVE ON A TYPICAL DAY: 0
HOW OFTEN DO YOU HAVE A DRINK CONTAINING ALCOHOL: NEVER

## 2025-03-29 SDOH — ECONOMIC STABILITY: FOOD INSECURITY: WITHIN THE PAST 12 MONTHS, YOU WORRIED THAT YOUR FOOD WOULD RUN OUT BEFORE YOU GOT MONEY TO BUY MORE.: PATIENT DECLINED

## 2025-03-29 SDOH — ECONOMIC STABILITY: INCOME INSECURITY: IN THE LAST 12 MONTHS, WAS THERE A TIME WHEN YOU WERE NOT ABLE TO PAY THE MORTGAGE OR RENT ON TIME?: YES

## 2025-03-29 SDOH — ECONOMIC STABILITY: FOOD INSECURITY: WITHIN THE PAST 12 MONTHS, THE FOOD YOU BOUGHT JUST DIDN'T LAST AND YOU DIDN'T HAVE MONEY TO GET MORE.: PATIENT DECLINED

## 2025-04-01 ENCOUNTER — OFFICE VISIT (OUTPATIENT)
Dept: FAMILY MEDICINE CLINIC | Age: 70
End: 2025-04-01
Payer: MEDICARE

## 2025-04-01 VITALS
TEMPERATURE: 98.2 F | RESPIRATION RATE: 20 BRPM | BODY MASS INDEX: 39.68 KG/M2 | OXYGEN SATURATION: 100 % | HEART RATE: 59 BPM | DIASTOLIC BLOOD PRESSURE: 75 MMHG | WEIGHT: 293 LBS | SYSTOLIC BLOOD PRESSURE: 124 MMHG | HEIGHT: 72 IN

## 2025-04-01 DIAGNOSIS — N39.0 RECURRENT UTI: ICD-10-CM

## 2025-04-01 DIAGNOSIS — B37.89 CANDIDIASIS OF BREAST: ICD-10-CM

## 2025-04-01 DIAGNOSIS — E66.813 OBESITY, CLASS 3: ICD-10-CM

## 2025-04-01 DIAGNOSIS — M05.79 RHEUMATOID ARTHRITIS INVOLVING MULTIPLE SITES WITH POSITIVE RHEUMATOID FACTOR (HCC): Chronic | ICD-10-CM

## 2025-04-01 DIAGNOSIS — Z00.00 MEDICARE ANNUAL WELLNESS VISIT, SUBSEQUENT: Primary | ICD-10-CM

## 2025-04-01 DIAGNOSIS — N30.01 ACUTE CYSTITIS WITH HEMATURIA: ICD-10-CM

## 2025-04-01 LAB
BILIRUBIN, POC: NORMAL
BLOOD URINE, POC: NORMAL
CLARITY, POC: NORMAL
COLOR, POC: NORMAL
GLUCOSE URINE, POC: NEGATIVE MG/DL
KETONES, POC: NEGATIVE MG/DL
LEUKOCYTE EST, POC: NORMAL
NITRITE, POC: NEGATIVE
PH, POC: 6.5
PROTEIN, POC: >=300 MG/DL
SPECIFIC GRAVITY, POC: >=1.03
UROBILINOGEN, POC: 0.2 MG/DL

## 2025-04-01 PROCEDURE — 99213 OFFICE O/P EST LOW 20 MIN: CPT | Performed by: FAMILY MEDICINE

## 2025-04-01 PROCEDURE — 1160F RVW MEDS BY RX/DR IN RCRD: CPT | Performed by: FAMILY MEDICINE

## 2025-04-01 PROCEDURE — 1159F MED LIST DOCD IN RCRD: CPT | Performed by: FAMILY MEDICINE

## 2025-04-01 PROCEDURE — 81002 URINALYSIS NONAUTO W/O SCOPE: CPT | Performed by: FAMILY MEDICINE

## 2025-04-01 PROCEDURE — G0439 PPPS, SUBSEQ VISIT: HCPCS | Performed by: FAMILY MEDICINE

## 2025-04-01 PROCEDURE — 1123F ACP DISCUSS/DSCN MKR DOCD: CPT | Performed by: FAMILY MEDICINE

## 2025-04-01 RX ORDER — FOLIC ACID 1 MG/1
TABLET ORAL
Qty: 90 TABLET | Refills: 3 | Status: SHIPPED | OUTPATIENT
Start: 2025-04-01

## 2025-04-01 RX ORDER — NITROFURANTOIN 25; 75 MG/1; MG/1
100 CAPSULE ORAL 2 TIMES DAILY
Qty: 14 CAPSULE | Refills: 0 | Status: SHIPPED | OUTPATIENT
Start: 2025-04-01 | End: 2025-04-08

## 2025-04-01 RX ORDER — CONJUGATED ESTROGENS 0.62 MG/G
CREAM VAGINAL
Qty: 30 G | Refills: 3 | Status: SHIPPED | OUTPATIENT
Start: 2025-04-01

## 2025-04-01 NOTE — PROGRESS NOTES
and oriented to person, place, and time. Mental status is at baseline.   Psychiatric:         Mood and Affect: Mood normal.         Thought Content: Thought content normal.                   Allergies   Allergen Reactions    Enbrel [Etanercept]      Swelling    Humira [Adalimumab]      Swelling     Penicillins Swelling and Rash     n/v     Prior to Visit Medications    Medication Sig Taking? Authorizing Provider   sertraline (ZOLOFT) 100 MG tablet Take 1 tablet by mouth nightly Yes Elin Patel MD   omeprazole (PRILOSEC) 40 MG delayed release capsule take 1 capsule by mouth once daily if needed for HEARTBURN Yes Elin Patel MD   levothyroxine (SYNTHROID) 137 MCG tablet Take 1 tablet by mouth daily Yes Elin Patel MD   Cholecalciferol 50 MCG (2000 UT) TABS Take 1 tablet by mouth daily Yes Elin Patel MD   folic acid (FOLVITE) 1 MG tablet take 1 tablet by mouth once daily Yes Kandi Feliz APRN - CNP   nystatin (MYCOSTATIN) 457163 UNIT/GM powder Apply 3 times daily. Yes Kandi Feliz APRN - CNP   Handicap Placard MISC by Does not apply route Start 7/13/2021 and expires 7/12/2026 Yes Kandi Feliz APRN - CNP   Upadacitinib ER 15 MG TB24 Take 1 tablet by mouth daily Yes Amna Crow MD   methotrexate (RHEUMATREX) 2.5 MG chemo tablet Indications: Take 8 tablets weekly  Patient taking differently: Take 6 tablets by mouth once a week Indications: Take 8 tablets weekly Yes mAna Crow MD   Multiple Vitamins-Minerals (MULTI-VITAMIN GUMMIES PO) Take 2 tablets by mouth daily Yes Provider, Historical, MD       CareTeam (Including outside providers/suppliers regularly involved in providing care):   Patient Care Team:  Elin Patel MD as PCP - General (Family Medicine)  Elin Patel MD as PCP - Empaneled Provider     Recommendations for Preventive Services Due: see orders and patient instructions/AVS.  Recommended screening schedule for the next 5-10 years is provided to

## 2025-04-03 ENCOUNTER — PATIENT MESSAGE (OUTPATIENT)
Dept: FAMILY MEDICINE CLINIC | Age: 70
End: 2025-04-03

## 2025-04-03 RX ORDER — GRANULES FOR ORAL 3 G/1
3 POWDER ORAL ONCE
Qty: 1 EACH | Refills: 0 | Status: SHIPPED | OUTPATIENT
Start: 2025-04-03 | End: 2025-04-03

## 2025-04-04 ENCOUNTER — RESULTS FOLLOW-UP (OUTPATIENT)
Dept: FAMILY MEDICINE CLINIC | Age: 70
End: 2025-04-04

## 2025-04-04 DIAGNOSIS — N30.01 ACUTE CYSTITIS WITH HEMATURIA: Primary | ICD-10-CM

## 2025-04-04 DIAGNOSIS — R10.30 LOWER ABDOMINAL PAIN: ICD-10-CM

## 2025-04-05 LAB
CULTURE: ABNORMAL
SPECIMEN DESCRIPTION: ABNORMAL

## 2025-04-08 RX ORDER — SULFAMETHOXAZOLE AND TRIMETHOPRIM 800; 160 MG/1; MG/1
1 TABLET ORAL 2 TIMES DAILY
Qty: 14 TABLET | Refills: 0 | Status: SHIPPED | OUTPATIENT
Start: 2025-04-08 | End: 2025-04-15

## 2025-04-09 DIAGNOSIS — M05.79 RHEUMATOID ARTHRITIS INVOLVING MULTIPLE SITES WITH POSITIVE RHEUMATOID FACTOR (HCC): Chronic | ICD-10-CM

## 2025-04-09 LAB
ALBUMIN: 4.9 G/DL (ref 3.5–5.2)
ALP BLD-CCNC: 108 U/L (ref 35–104)
ALT SERPL-CCNC: 43 U/L (ref 0–32)
ANION GAP SERPL CALCULATED.3IONS-SCNC: 13 MMOL/L (ref 7–16)
AST SERPL-CCNC: 37 U/L (ref 0–31)
BASOPHILS ABSOLUTE: 0.03 K/UL (ref 0–0.2)
BASOPHILS RELATIVE PERCENT: 1 % (ref 0–2)
BILIRUB SERPL-MCNC: 0.7 MG/DL (ref 0–1.2)
BUN BLDV-MCNC: 19 MG/DL (ref 6–23)
CALCIUM SERPL-MCNC: 9.7 MG/DL (ref 8.6–10.2)
CHLORIDE BLD-SCNC: 102 MMOL/L (ref 98–107)
CO2: 23 MMOL/L (ref 22–29)
CREAT SERPL-MCNC: 0.9 MG/DL (ref 0.5–1)
EOSINOPHILS ABSOLUTE: 0.08 K/UL (ref 0.05–0.5)
EOSINOPHILS RELATIVE PERCENT: 2 % (ref 0–6)
GFR, ESTIMATED: 73 ML/MIN/1.73M2
GLUCOSE BLD-MCNC: 120 MG/DL (ref 74–99)
HCT VFR BLD CALC: 42.6 % (ref 34–48)
HEMOGLOBIN: 14.1 G/DL (ref 11.5–15.5)
IMMATURE GRANULOCYTES %: 1 % (ref 0–5)
IMMATURE GRANULOCYTES ABSOLUTE: 0.03 K/UL (ref 0–0.58)
LYMPHOCYTES ABSOLUTE: 0.56 K/UL (ref 1.5–4)
LYMPHOCYTES RELATIVE PERCENT: 13 % (ref 20–42)
MCH RBC QN AUTO: 30.3 PG (ref 26–35)
MCHC RBC AUTO-ENTMCNC: 33.1 G/DL (ref 32–34.5)
MCV RBC AUTO: 91.4 FL (ref 80–99.9)
MONOCYTES ABSOLUTE: 0.52 K/UL (ref 0.1–0.95)
MONOCYTES RELATIVE PERCENT: 12 % (ref 2–12)
NEUTROPHILS ABSOLUTE: 2.98 K/UL (ref 1.8–7.3)
NEUTROPHILS RELATIVE PERCENT: 71 % (ref 43–80)
PDW BLD-RTO: 13.7 % (ref 11.5–15)
PLATELET # BLD: 185 K/UL (ref 130–450)
PMV BLD AUTO: 10.6 FL (ref 7–12)
POTASSIUM SERPL-SCNC: 4.7 MMOL/L (ref 3.5–5)
RBC # BLD: 4.66 M/UL (ref 3.5–5.5)
RBC # BLD: ABNORMAL 10*6/UL
SODIUM BLD-SCNC: 138 MMOL/L (ref 132–146)
TOTAL PROTEIN: 7.7 G/DL (ref 6.4–8.3)
WBC # BLD: 4.2 K/UL (ref 4.5–11.5)

## 2025-04-10 LAB
FOLATE: 18.5 NG/ML (ref 4.8–24.2)
VITAMIN B-12: 456 PG/ML (ref 211–946)

## 2025-04-11 ENCOUNTER — HOSPITAL ENCOUNTER (OUTPATIENT)
Dept: CT IMAGING | Age: 70
Discharge: HOME | End: 2025-04-11
Attending: FAMILY MEDICINE
Payer: MEDICARE

## 2025-04-11 ENCOUNTER — HOSPITAL ENCOUNTER (EMERGENCY)
Age: 70
Discharge: HOME | End: 2025-04-11
Attending: STUDENT IN AN ORGANIZED HEALTH CARE EDUCATION/TRAINING PROGRAM
Payer: MEDICARE

## 2025-04-11 VITALS
HEART RATE: 84 BPM | TEMPERATURE: 98.3 F | BODY MASS INDEX: 39.68 KG/M2 | SYSTOLIC BLOOD PRESSURE: 123 MMHG | DIASTOLIC BLOOD PRESSURE: 81 MMHG | WEIGHT: 293 LBS | OXYGEN SATURATION: 95 % | RESPIRATION RATE: 16 BRPM | HEIGHT: 72 IN

## 2025-04-11 DIAGNOSIS — R10.30 LOWER ABDOMINAL PAIN: ICD-10-CM

## 2025-04-11 DIAGNOSIS — N30.00 ACUTE CYSTITIS WITHOUT HEMATURIA: Primary | ICD-10-CM

## 2025-04-11 DIAGNOSIS — N30.01 ACUTE CYSTITIS WITH HEMATURIA: ICD-10-CM

## 2025-04-11 LAB
ALBUMIN SERPL-MCNC: 4.9 G/DL (ref 3.5–5.2)
ALP SERPL-CCNC: 120 U/L (ref 35–104)
ALT SERPL-CCNC: 47 U/L (ref 0–32)
ANION GAP SERPL CALCULATED.3IONS-SCNC: 12 MMOL/L (ref 7–16)
AST SERPL-CCNC: 44 U/L (ref 0–31)
BACTERIA URNS QL MICRO: ABNORMAL
BASOPHILS # BLD: 0 K/UL (ref 0–0.2)
BASOPHILS NFR BLD: 0 % (ref 0–2)
BILIRUB SERPL-MCNC: 0.6 MG/DL (ref 0–1.2)
BILIRUB UR QL STRIP: ABNORMAL
BUN SERPL-MCNC: 23 MG/DL (ref 6–23)
CALCIUM SERPL-MCNC: 9.5 MG/DL (ref 8.6–10.2)
CHLORIDE SERPL-SCNC: 100 MMOL/L (ref 98–107)
CLARITY UR: CLEAR
CO2 SERPL-SCNC: 25 MMOL/L (ref 22–29)
COLOR UR: YELLOW
CREAT SERPL-MCNC: 1.2 MG/DL (ref 0.5–1)
EOSINOPHIL # BLD: 0.05 K/UL (ref 0.05–0.5)
EOSINOPHILS RELATIVE PERCENT: 1 % (ref 0–6)
EPI CELLS #/AREA URNS HPF: ABNORMAL /HPF
ERYTHROCYTE [DISTWIDTH] IN BLOOD BY AUTOMATED COUNT: 13.5 % (ref 11.5–15)
GFR, ESTIMATED: 47 ML/MIN/1.73M2
GLUCOSE SERPL-MCNC: 104 MG/DL (ref 74–99)
GLUCOSE UR STRIP-MCNC: NEGATIVE MG/DL
HCT VFR BLD AUTO: 44.4 % (ref 34–48)
HGB BLD-MCNC: 15.1 G/DL (ref 11.5–15.5)
HGB UR QL STRIP.AUTO: ABNORMAL
KETONES UR STRIP-MCNC: ABNORMAL MG/DL
LEUKOCYTE ESTERASE UR QL STRIP: ABNORMAL
LYMPHOCYTES NFR BLD: 0.33 K/UL (ref 1.5–4)
LYMPHOCYTES RELATIVE PERCENT: 6 % (ref 20–42)
MCH RBC QN AUTO: 30.1 PG (ref 26–35)
MCHC RBC AUTO-ENTMCNC: 34 G/DL (ref 32–34.5)
MCV RBC AUTO: 88.4 FL (ref 80–99.9)
MONOCYTES NFR BLD: 0.38 K/UL (ref 0.1–0.95)
MONOCYTES NFR BLD: 7 % (ref 2–12)
NEUTROPHILS NFR BLD: 86 % (ref 43–80)
NEUTS SEG NFR BLD: 4.73 K/UL (ref 1.8–7.3)
NITRITE UR QL STRIP: POSITIVE
PH UR STRIP: 6 [PH] (ref 5–8)
PLATELET # BLD AUTO: 179 K/UL (ref 130–450)
PMV BLD AUTO: 9.7 FL (ref 7–12)
POTASSIUM SERPL-SCNC: 4.4 MMOL/L (ref 3.5–5)
PROT SERPL-MCNC: 8.3 G/DL (ref 6.4–8.3)
PROT UR STRIP-MCNC: 100 MG/DL
RBC # BLD AUTO: 5.02 M/UL (ref 3.5–5.5)
RBC # BLD: NORMAL 10*6/UL
RBC #/AREA URNS HPF: ABNORMAL /HPF
SODIUM SERPL-SCNC: 137 MMOL/L (ref 132–146)
SP GR UR STRIP: 1.02 (ref 1–1.03)
UROBILINOGEN UR STRIP-ACNC: 0.2 EU/DL (ref 0–1)
WBC #/AREA URNS HPF: ABNORMAL /HPF
WBC OTHER # BLD: 5.5 K/UL (ref 4.5–11.5)

## 2025-04-11 PROCEDURE — 81001 URINALYSIS AUTO W/SCOPE: CPT

## 2025-04-11 PROCEDURE — 99283 EMERGENCY DEPT VISIT LOW MDM: CPT

## 2025-04-11 PROCEDURE — 87086 URINE CULTURE/COLONY COUNT: CPT

## 2025-04-11 PROCEDURE — 85025 COMPLETE CBC W/AUTO DIFF WBC: CPT

## 2025-04-11 PROCEDURE — 80053 COMPREHEN METABOLIC PANEL: CPT

## 2025-04-11 PROCEDURE — 74176 CT ABD & PELVIS W/O CONTRAST: CPT

## 2025-04-11 RX ORDER — PHENAZOPYRIDINE HYDROCHLORIDE 100 MG/1
100 TABLET, FILM COATED ORAL 3 TIMES DAILY PRN
Qty: 9 TABLET | Refills: 0 | Status: SHIPPED | OUTPATIENT
Start: 2025-04-11 | End: 2025-04-14

## 2025-04-11 NOTE — ED PROVIDER NOTES
[facility] EMERGENCY DEPARTMENT  EMERGENCY DEPARTMENT ENCOUNTER    Pt Name: Rosalie Chen  MRN: [id number]  Birthdate 1955  Date of evaluation: 4/11/2025  Provider: Soto Law MD  PCP: Elin Patel MD  Note Started: 4:17 PM EDT 4/11/25    HPI     Patient is a 69 y.o. female presents with a chief complaint of   Chief Complaint   Patient presents with    Abdominal Pain     States that she has had a UTI continuously and on 5 rounds of abx. Not better. Had outpt CT done to check kidneys but was told to come here in case of need for IV abx?    .    Patient presents for continuous UTI.  Patient stated that she has been on multiple antibiotics for this.  Is adamant that she has been taking her antibiotics as prescribed.  Denies any fevers or chills.  Patient states that the symptoms are just unresolved.  Patient did get a CAT scan earlier today that did not show any acute findings.  Patient denies any diarrhea.  Patient stated that she is having no vaginal complaints.  No vaginal dryness.  Patient stated that she wipes from front to back.  Patient stated that she had the CAT scan today and then came in because she was already in the hospital.    Nursing Notes were all reviewed and agreed with or any disagreements were addressed in the HPI.    History From: Patient    Review of Systems   Pertinent positives and negatives as per HPI.     Physical Exam  Vitals and nursing note reviewed.   Constitutional:       Appearance: She is well-developed.   HENT:      Head: Normocephalic and atraumatic.   Eyes:      Conjunctiva/sclera: Conjunctivae normal.   Cardiovascular:      Rate and Rhythm: Normal rate and regular rhythm.      Heart sounds: Normal heart sounds. No murmur heard.  Pulmonary:      Effort: Pulmonary effort is normal. No respiratory distress.      Breath sounds: Normal breath sounds. No wheezing or rales.   Abdominal:      General: Bowel sounds are normal.      Palpations: Abdomen

## 2025-04-11 NOTE — ED NOTES
Pt unable to provide urine at this time. Pt aware that staff needs a sample and agrees to let someone know when she is able to provide one.

## 2025-04-13 LAB
MICROORGANISM SPEC CULT: ABNORMAL
MICROORGANISM SPEC CULT: ABNORMAL
SERVICE CMNT-IMP: ABNORMAL
SPECIMEN DESCRIPTION: ABNORMAL

## 2025-04-15 ENCOUNTER — PATIENT MESSAGE (OUTPATIENT)
Dept: FAMILY MEDICINE CLINIC | Age: 70
End: 2025-04-15

## 2025-04-16 RX ORDER — GUAIFENESIN/DEXTROMETHORPHAN 100-10MG/5
5 SYRUP ORAL 3 TIMES DAILY PRN
Qty: 120 ML | Refills: 0 | Status: SHIPPED | OUTPATIENT
Start: 2025-04-16 | End: 2025-04-26

## 2025-04-17 ENCOUNTER — RESULTS FOLLOW-UP (OUTPATIENT)
Dept: FAMILY MEDICINE CLINIC | Age: 70
End: 2025-04-17

## 2025-04-17 DIAGNOSIS — R16.0 HEPATOMEGALY: ICD-10-CM

## 2025-04-17 DIAGNOSIS — R16.1 SPLENOMEGALY: Primary | ICD-10-CM

## 2025-04-22 DIAGNOSIS — R16.1 SPLENOMEGALY: ICD-10-CM

## 2025-04-22 LAB
ABSOLUTE RETIC #: 0.11 M/UL
IMMATURE RETIC FRACT: 9.3 % (ref 3–15.9)
LACTATE DEHYDROGENASE: 288 U/L (ref 135–214)
RETIC HEMOGLOBIN: 34 PG (ref 28.2–36.6)
RETICULOCYTE COUNT PCT: 2.2 % (ref 0.4–1.9)

## 2025-04-23 LAB — PATHOLOGIST REVIEW: NORMAL

## 2025-04-24 DIAGNOSIS — R70.1 RETICULOCYTOSIS: Primary | ICD-10-CM

## 2025-04-24 LAB
DAT, POLYSPECIFIC: NEGATIVE
HAPTOGLOBIN: 60 MG/DL (ref 30–200)

## 2025-04-25 ENCOUNTER — RESULTS FOLLOW-UP (OUTPATIENT)
Dept: FAMILY MEDICINE CLINIC | Age: 70
End: 2025-04-25

## 2025-05-07 ENCOUNTER — PATIENT MESSAGE (OUTPATIENT)
Dept: FAMILY MEDICINE CLINIC | Age: 70
End: 2025-05-07

## 2025-05-07 DIAGNOSIS — N30.00 ACUTE CYSTITIS WITHOUT HEMATURIA: Primary | ICD-10-CM

## 2025-05-07 RX ORDER — SULFAMETHOXAZOLE AND TRIMETHOPRIM 800; 160 MG/1; MG/1
1 TABLET ORAL 2 TIMES DAILY
Qty: 6 TABLET | Refills: 0 | Status: SHIPPED | OUTPATIENT
Start: 2025-05-07 | End: 2025-05-10

## 2025-05-16 ENCOUNTER — HOSPITAL ENCOUNTER (OUTPATIENT)
Dept: ULTRASOUND IMAGING | Age: 70
Discharge: HOME | End: 2025-05-18
Payer: MEDICARE

## 2025-05-16 DIAGNOSIS — R16.0 HEPATOMEGALY: ICD-10-CM

## 2025-05-16 DIAGNOSIS — R16.1 SPLENOMEGALY: ICD-10-CM

## 2025-05-16 PROCEDURE — 76981 USE PARENCHYMA: CPT

## 2025-05-21 ENCOUNTER — RESULTS FOLLOW-UP (OUTPATIENT)
Dept: FAMILY MEDICINE CLINIC | Age: 70
End: 2025-05-21

## 2025-06-02 LAB
ALBUMIN: 4.8 G/DL
ALP BLD-CCNC: 89 U/L
ALT SERPL-CCNC: 33 U/L
ANION GAP SERPL CALCULATED.3IONS-SCNC: ABNORMAL MMOL/L
AST SERPL-CCNC: 26 U/L
BASOPHILS ABSOLUTE: 32 /ΜL
BASOPHILS RELATIVE PERCENT: 0.8 %
BILIRUB SERPL-MCNC: 0.7 MG/DL (ref 0.1–1.4)
BUN BLDV-MCNC: 20 MG/DL
C-REACTIVE PROTEIN: <3
CALCIUM SERPL-MCNC: 9.5 MG/DL
CHLORIDE BLD-SCNC: 103 MMOL/L
CO2: 28 MMOL/L
CREAT SERPL-MCNC: 0.77 MG/DL
EOSINOPHILS ABSOLUTE: 40 /ΜL
EOSINOPHILS RELATIVE PERCENT: 1 %
ESTIMATED AVERAGE GLUCOSE: ABNORMAL
GFR, ESTIMATED: 82
GLUCOSE BLD-MCNC: 90 MG/DL
HBA1C MFR BLD: 5.8 %
HCT VFR BLD CALC: 43.6 % (ref 36–46)
HEMOGLOBIN: 14.1 G/DL (ref 12–16)
LYMPHOCYTES ABSOLUTE: 636 /ΜL
LYMPHOCYTES RELATIVE PERCENT: 15.9 %
MCH RBC QN AUTO: 30 PG
MCHC RBC AUTO-ENTMCNC: 32.3 G/DL
MCV RBC AUTO: 92.8 FL
MONOCYTES ABSOLUTE: 352 /ΜL
MONOCYTES RELATIVE PERCENT: 8.8 %
NEUTROPHILS ABSOLUTE: 2940 /ΜL
NEUTROPHILS RELATIVE PERCENT: 73.5 %
PLATELET # BLD: 209 K/ΜL
PMV BLD AUTO: 9.9 FL
POTASSIUM SERPL-SCNC: 4.2 MMOL/L
RBC # BLD: 4.7 10^6/ΜL
SED RATE, AUTOMATED: 6
SODIUM BLD-SCNC: 141 MMOL/L
TOTAL PROTEIN: 7.7 G/DL (ref 6.4–8.2)
TSH SERPL DL<=0.05 MIU/L-ACNC: 2.51 UIU/ML
VITAMIN B-12: 416
WBC # BLD: 4 10^3/ML

## 2025-07-28 LAB
ALBUMIN: 4.8 G/DL
ALP BLD-CCNC: 91 U/L
ALT SERPL-CCNC: 33 U/L
ANION GAP SERPL CALCULATED.3IONS-SCNC: 1.7 MMOL/L
AST SERPL-CCNC: 33 U/L
BASOPHILS ABSOLUTE: 30 /ΜL
BASOPHILS RELATIVE PERCENT: 0.8 %
BILIRUB SERPL-MCNC: 0.7 MG/DL (ref 0.1–1.4)
BUN BLDV-MCNC: 18 MG/DL
CALCIUM SERPL-MCNC: 9.7 MG/DL
CHLORIDE BLD-SCNC: 103 MMOL/L
CO2: 30 MMOL/L
CREAT SERPL-MCNC: 0.78 MG/DL
EOSINOPHILS ABSOLUTE: 61 /ΜL
EOSINOPHILS RELATIVE PERCENT: 1.6 %
GFR, ESTIMATED: 81
GLUCOSE BLD-MCNC: 101 MG/DL
HCT VFR BLD CALC: 44 % (ref 36–46)
HEMOGLOBIN: 14.4 G/DL (ref 12–16)
LYMPHOCYTES ABSOLUTE: 775 /ΜL
LYMPHOCYTES RELATIVE PERCENT: 20.4 %
MCH RBC QN AUTO: 29.9 PG
MCHC RBC AUTO-ENTMCNC: 32.7 G/DL
MCV RBC AUTO: 91.5 FL
MONOCYTES ABSOLUTE: 414 /ΜL
MONOCYTES RELATIVE PERCENT: 10.9 %
NEUTROPHILS ABSOLUTE: 2519 /ΜL
NEUTROPHILS RELATIVE PERCENT: 66.3 %
PLATELET # BLD: 203 K/ΜL
PMV BLD AUTO: 10.1 FL
POTASSIUM SERPL-SCNC: 4.6 MMOL/L
RBC # BLD: 4.81 10^6/ΜL
SED RATE, AUTOMATED: 6
SODIUM BLD-SCNC: 139 MMOL/L
TOTAL PROTEIN: 7.7 G/DL (ref 6.4–8.2)
WBC # BLD: 3.8 10^3/ML

## 2025-08-13 ENCOUNTER — OFFICE VISIT (OUTPATIENT)
Dept: FAMILY MEDICINE CLINIC | Age: 70
End: 2025-08-13

## 2025-08-13 VITALS
OXYGEN SATURATION: 96 % | HEART RATE: 74 BPM | SYSTOLIC BLOOD PRESSURE: 112 MMHG | DIASTOLIC BLOOD PRESSURE: 86 MMHG | WEIGHT: 293 LBS | RESPIRATION RATE: 20 BRPM | BODY MASS INDEX: 39.68 KG/M2 | TEMPERATURE: 98 F | HEIGHT: 72 IN

## 2025-08-13 DIAGNOSIS — N39.0 RECURRENT UTI: ICD-10-CM

## 2025-08-13 DIAGNOSIS — E55.9 VITAMIN D DEFICIENCY: Primary | ICD-10-CM

## 2025-08-13 DIAGNOSIS — E78.2 MODERATE MIXED HYPERLIPIDEMIA NOT REQUIRING STATIN THERAPY: ICD-10-CM

## 2025-08-13 DIAGNOSIS — E06.3 HYPOTHYROIDISM DUE TO HASHIMOTO'S THYROIDITIS: ICD-10-CM

## 2025-08-13 DIAGNOSIS — F41.9 ANXIETY AND DEPRESSION: Chronic | ICD-10-CM

## 2025-08-13 DIAGNOSIS — M85.80 OSTEOPENIA, UNSPECIFIED LOCATION: ICD-10-CM

## 2025-08-13 DIAGNOSIS — M05.79 RHEUMATOID ARTHRITIS INVOLVING MULTIPLE SITES WITH POSITIVE RHEUMATOID FACTOR (HCC): Chronic | ICD-10-CM

## 2025-08-13 DIAGNOSIS — F32.A ANXIETY AND DEPRESSION: Chronic | ICD-10-CM

## 2025-08-13 RX ORDER — HYDROCORTISONE 25 MG/G
CREAM TOPICAL
COMMUNITY
Start: 2025-08-02

## 2025-08-13 RX ORDER — CLOBETASOL PROPIONATE 0.5 MG/G
OINTMENT TOPICAL
COMMUNITY
Start: 2025-07-31

## 2025-08-13 RX ORDER — NITROFURANTOIN MACROCRYSTALS 50 MG/1
50 CAPSULE ORAL DAILY
COMMUNITY
Start: 2025-08-08

## 2025-08-13 RX ORDER — ERGOCALCIFEROL 1.25 MG/1
50000 CAPSULE, LIQUID FILLED ORAL WEEKLY
Qty: 12 CAPSULE | Refills: 1 | Status: SHIPPED | OUTPATIENT
Start: 2025-08-13

## 2025-09-03 ENCOUNTER — OFFICE VISIT (OUTPATIENT)
Age: 70
End: 2025-09-03

## 2025-09-03 VITALS — WEIGHT: 293 LBS | HEIGHT: 72 IN | BODY MASS INDEX: 39.68 KG/M2

## 2025-09-03 DIAGNOSIS — M79.601 RIGHT ARM PAIN: ICD-10-CM

## 2025-09-03 DIAGNOSIS — S42.201A CLOSED FRACTURE OF PROXIMAL END OF RIGHT HUMERUS, UNSPECIFIED FRACTURE MORPHOLOGY, INITIAL ENCOUNTER: Primary | ICD-10-CM

## 2025-09-03 DIAGNOSIS — M25.511 ACUTE PAIN OF RIGHT SHOULDER: Primary | ICD-10-CM

## 2025-09-03 RX ORDER — TRAMADOL HYDROCHLORIDE 50 MG/1
50 TABLET ORAL EVERY 6 HOURS PRN
Qty: 28 TABLET | Refills: 0 | Status: SHIPPED | OUTPATIENT
Start: 2025-09-03 | End: 2025-09-10

## (undated) DEVICE — 3M™ RED DOT™ MONITORING ELECTRODE WITH FOAM TAPE AND STICKY GEL 2560, 50/BAG, 20/CASE, 72/PLT: Brand: RED DOT™

## (undated) DEVICE — MARKER,SKIN,WI/RULER AND LABELS: Brand: MEDLINE

## (undated) DEVICE — NEEDLE SPNL 22GA L3.5IN BLK HUB S STL REG WALL FIT STYL W/

## (undated) DEVICE — TOWEL OR BLUEE 16X26IN ST 8 PACK ORB08 16X26ORTWL

## (undated) DEVICE — ENCORE® LATEX TEXTURED SIZE 6.5, STERILE LATEX POWDER-FREE SURGICAL GLOVE: Brand: ENCORE

## (undated) DEVICE — 3 ML SYRINGE LUER-LOCK TIP: Brand: MONOJECT

## (undated) DEVICE — Z DISCONTINUED APPLICATOR SURG PREP 0.35OZ 2% CHG 70% ISO ALC W/ HI LT

## (undated) DEVICE — NON-DEHP CATHETER EXTENSION SET, MALE LUER LOCK ADAPTER

## (undated) DEVICE — NEEDLE HYPO 25GA L1.5IN BLU POLYPR HUB S STL REG BVL STR

## (undated) DEVICE — NEEDLE HYPO 18GA L1.5IN PNK POLYPR HUB S STL THN WALL FILL

## (undated) DEVICE — BANDAGE ADH W1XL3IN NAT FAB WVN FLX DURABLE N ADH PD SEAL

## (undated) DEVICE — NEEDLE SPNL 22GA L5IN BLK HUB S STL W/ QNCKE PNT W/OUT

## (undated) DEVICE — 6 ML SYRINGE LUER-LOCK TIP: Brand: MONOJECT

## (undated) DEVICE — GAUZE,SPONGE,4"X4",12PLY,STERILE,LF,2'S: Brand: MEDLINE